# Patient Record
Sex: FEMALE | Race: WHITE | NOT HISPANIC OR LATINO | Employment: OTHER | URBAN - METROPOLITAN AREA
[De-identification: names, ages, dates, MRNs, and addresses within clinical notes are randomized per-mention and may not be internally consistent; named-entity substitution may affect disease eponyms.]

---

## 2017-02-03 ENCOUNTER — ALLSCRIPTS OFFICE VISIT (OUTPATIENT)
Dept: OTHER | Facility: OTHER | Age: 72
End: 2017-02-03

## 2018-01-11 NOTE — RESULT NOTES
Message   Please review labs  Prediabetes - follow up with me   FS     Verified Results  (1) TSH 28Apr2016 10:28AM Lizzie Garrison     Test Name Result Flag Reference   TSH 1 530 uIU/mL  0 450-4 500

## 2018-01-14 VITALS
WEIGHT: 165 LBS | SYSTOLIC BLOOD PRESSURE: 164 MMHG | DIASTOLIC BLOOD PRESSURE: 96 MMHG | HEART RATE: 60 BPM | BODY MASS INDEX: 37.12 KG/M2 | HEIGHT: 56 IN | TEMPERATURE: 97.5 F | RESPIRATION RATE: 20 BRPM

## 2018-01-15 NOTE — RESULT NOTES
Message   Labs perfect  Keep up the good work with diet  FS     Verified Results  (1) HEMOGLOBIN A1C 34QNG1646 10:19AM Tanya Chaudhary     Test Name Result Flag Reference   HEMOGLOBIN A1C 5 7 %  4 2-6 3   EST  AVG  GLUCOSE 117 mg/dl         Plan  Pre-diabetes    · Hemoglobin A1c- POC ; every 6 months;  Next [id number]; Status:Active

## 2018-11-20 DIAGNOSIS — I10 ESSENTIAL HYPERTENSION: ICD-10-CM

## 2018-11-20 DIAGNOSIS — E03.9 HYPOTHYROIDISM, UNSPECIFIED TYPE: ICD-10-CM

## 2018-11-20 DIAGNOSIS — I15.9 SECONDARY HYPERTENSION: Primary | ICD-10-CM

## 2018-11-20 RX ORDER — LOSARTAN POTASSIUM 100 MG/1
100 TABLET ORAL DAILY
Qty: 90 TABLET | Refills: 0 | Status: SHIPPED | OUTPATIENT
Start: 2018-11-20 | End: 2018-11-21 | Stop reason: SDUPTHER

## 2018-11-20 RX ORDER — LOSARTAN POTASSIUM 100 MG/1
100 TABLET ORAL DAILY
Qty: 30 TABLET | Refills: 0 | Status: SHIPPED | OUTPATIENT
Start: 2018-11-20 | End: 2018-11-20 | Stop reason: SDUPTHER

## 2018-11-20 RX ORDER — LEVOTHYROXINE SODIUM 0.03 MG/1
88 TABLET ORAL DAILY
Qty: 105 TABLET | Refills: 0 | Status: SHIPPED | OUTPATIENT
Start: 2018-11-20 | End: 2018-11-20 | Stop reason: ALTCHOICE

## 2018-11-20 RX ORDER — LEVOTHYROXINE SODIUM 88 UG/1
88 TABLET ORAL DAILY
Qty: 90 TABLET | Refills: 0 | Status: SHIPPED | OUTPATIENT
Start: 2018-11-20 | End: 2019-05-08 | Stop reason: SDUPTHER

## 2018-11-20 RX ORDER — LOSARTAN POTASSIUM 100 MG/1
100 TABLET ORAL DAILY
Qty: 90 TABLET | Refills: 0 | Status: SHIPPED | OUTPATIENT
Start: 2018-11-20 | End: 2018-11-20 | Stop reason: SDUPTHER

## 2018-11-21 DIAGNOSIS — I10 ESSENTIAL HYPERTENSION: ICD-10-CM

## 2018-11-21 RX ORDER — LOSARTAN POTASSIUM 100 MG/1
100 TABLET ORAL DAILY
Qty: 90 TABLET | Refills: 0 | Status: SHIPPED | OUTPATIENT
Start: 2018-11-21 | End: 2019-02-25 | Stop reason: SDUPTHER

## 2018-12-18 ENCOUNTER — OFFICE VISIT (OUTPATIENT)
Dept: FAMILY MEDICINE CLINIC | Facility: CLINIC | Age: 73
End: 2018-12-18
Payer: MEDICARE

## 2018-12-18 VITALS
WEIGHT: 167 LBS | RESPIRATION RATE: 20 BRPM | OXYGEN SATURATION: 95 % | SYSTOLIC BLOOD PRESSURE: 138 MMHG | TEMPERATURE: 97.6 F | HEIGHT: 58 IN | DIASTOLIC BLOOD PRESSURE: 88 MMHG | HEART RATE: 98 BPM | BODY MASS INDEX: 35.05 KG/M2

## 2018-12-18 DIAGNOSIS — E66.3 OVERWEIGHT: ICD-10-CM

## 2018-12-18 DIAGNOSIS — Z12.11 COLON CANCER SCREENING: Primary | ICD-10-CM

## 2018-12-18 DIAGNOSIS — Z00.00 ENCOUNTER FOR MEDICARE ANNUAL WELLNESS EXAM: ICD-10-CM

## 2018-12-18 DIAGNOSIS — Z23 NEED FOR INFLUENZA VACCINATION: ICD-10-CM

## 2018-12-18 DIAGNOSIS — Z23 NEED FOR VACCINATION AGAINST STREPTOCOCCUS PNEUMONIAE: ICD-10-CM

## 2018-12-18 DIAGNOSIS — Z13.820 SCREENING FOR OSTEOPOROSIS: ICD-10-CM

## 2018-12-18 LAB — SL AMB POCT HEMOGLOBIN AIC: 5

## 2018-12-18 PROCEDURE — 90670 PCV13 VACCINE IM: CPT

## 2018-12-18 PROCEDURE — G0008 ADMIN INFLUENZA VIRUS VAC: HCPCS

## 2018-12-18 PROCEDURE — G0009 ADMIN PNEUMOCOCCAL VACCINE: HCPCS

## 2018-12-18 PROCEDURE — 90662 IIV NO PRSV INCREASED AG IM: CPT

## 2018-12-18 PROCEDURE — G0439 PPPS, SUBSEQ VISIT: HCPCS | Performed by: FAMILY MEDICINE

## 2018-12-18 PROCEDURE — 83036 HEMOGLOBIN GLYCOSYLATED A1C: CPT | Performed by: FAMILY MEDICINE

## 2018-12-18 RX ORDER — AMLODIPINE BESYLATE 5 MG/1
TABLET ORAL
Refills: 0 | COMMUNITY
Start: 2018-12-02 | End: 2020-02-27 | Stop reason: ALTCHOICE

## 2018-12-18 NOTE — PROGRESS NOTES
Assessment/Plan:      Diagnoses and all orders for this visit:    Colon cancer screening  -     Ambulatory referral to Gastroenterology; Future    Screening for osteoporosis  -     DXA bone density spine hip and pelvis; Future    Other orders  -     amLODIPine (NORVASC) 5 mg tablet;           Subjective:     Patient ID: Mario Del Toro is a 74 y o  female  This is a 25-year-old female presents for a Medicare annual wellness exam         Review of Systems   Constitutional: Negative  HENT: Negative  Eyes: Negative  Respiratory: Negative  Cardiovascular: Negative  Gastrointestinal: Negative  Neurological: Negative  Objective:     Physical Exam   Constitutional: She is oriented to person, place, and time  She appears well-developed and well-nourished  HENT:   Head: Normocephalic and atraumatic  Right Ear: External ear normal    Left Ear: External ear normal    Nose: Nose normal    Mouth/Throat: Oropharynx is clear and moist  No oropharyngeal exudate  Eyes: Pupils are equal, round, and reactive to light  Conjunctivae and EOM are normal    Neck: No JVD present  No thyromegaly present  Cardiovascular: Normal rate, regular rhythm and normal heart sounds  Exam reveals no gallop and no friction rub  Murmur: 1/6 systolic murmur  Pulmonary/Chest: Effort normal and breath sounds normal  No respiratory distress  She has no wheezes  She has no rales  She exhibits no tenderness  Lymphadenopathy:     She has no cervical adenopathy  Neurological: She is alert and oriented to person, place, and time  No cranial nerve deficit   Coordination normal

## 2018-12-18 NOTE — PROGRESS NOTES
Assessment and Plan:    Problem List Items Addressed This Visit     None        Health Maintenance Due   Topic Date Due    Depression Screening PHQ  1945   [facility] Medicare Annual Wellness Visit (AWV)  1945    CRC Screening: Colonoscopy  1945    DTaP,Tdap,and Td Vaccines (1 - Tdap) 02/15/1964    Fall Risk  02/15/2008    Urinary Incontinence Screening  02/15/2008    Pneumococcal PPSV23/PCV13 65+ Years / High and Highest Risk (1 of 2 - PCV13) 02/15/2008    HEMOGLOBIN A1C  05/04/2017    INFLUENZA VACCINE  07/01/2018         HPI:  Ash Cochran is a 74 y o  female here for her annual wellness visit  There is no problem list on file for this patient  Past Medical History:   Diagnosis Date    Hyperlipidemia     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 ([address] )      Past Surgical History:   Procedure Laterality Date    BREAST LUMPECTOMY Right      Family History   Problem Relation Age of Onset    Other Mother         DJD, cervical    Diabetes Father         DM    Autism Son     Substance Abuse Neg Hx     Mental illness Neg Hx      History   Smoking Status    Never Smoker   Smokeless Tobacco    Never Used     History   Alcohol Use No      History   Drug Use No       Current Outpatient Prescriptions   Medication Sig Dispense Refill    amLODIPine (NORVASC) 5 mg tablet   0    levothyroxine 88 mcg tablet Take 1 tablet (88 mcg total) by mouth daily for 90 days 90 tablet 0    losartan (COZAAR) 100 MG tablet Take 1 tablet (100 mg total) by mouth daily for 90 days 90 tablet 0     No current facility-administered medications for this visit  Allergies   Allergen Reactions    Zoledronic Acid        There is no immunization history on file for this patient  Patient Care Team:  Kumar Wallace MD as PCP - General    Medicare Screening Tests and Risk Assessments:  Miladis Villanueva is here for her Subsequent Wellness visit        Health Risk Assessment:  Patient rates overall health as fair  Patient feels that their physical health rating is Same  Eyesight was rated as Slightly worse  Hearing was rated as Slightly worse  Patient feels that their emotional and mental health rating is Much better  Pain experienced by patient in the last 7 days has been A lot  Patient's pain rating has been 8/10  Patient states that she has experienced no weight loss or gain in last 6 months  Emotional/Mental Health:  Patient has been feeling nervous/anxious  PHQ-9 Depression Screening:    Frequency of the following problems over the past two weeks:      1  Little interest or pleasure in doing things: 0 - not at all      2  Feeling down, depressed, or hopeless: 0 - not at all  PHQ-2 Score: 0          Broken Bones/Falls: Fall Risk Assessment:    In the past year, patient has experienced: No history of falling in past year          Bladder/Bowel:  Patient has not leaked urine accidently in the last six months  Patient reports no loss of bowel control  Immunizations:  Patient has not had a flu vaccination within the last year  Patient has not received a pneumonia shot  Patient has not received a shingles shot  Home Safety:  Patient does not have trouble with stairs inside or outside of their home  Patient currently reports that there are no safety hazards present in home, working smoke alarms, working carbon monoxide detectors  Preventative Screenings:   Breast cancer screening performed, no colon cancer screen completed, glaucoma eye exam completed,     Nutrition:  Current diet: Low Cholesterol, Low Saturated Fat, Low Carb and No Added Salt with servings of the following:    Medications:  Patient is not currently taking any over-the-counter supplements  Patient is able to manage medications  Lifestyle Choices:  Patient reports no tobacco use  Patient has not smoked or used tobacco in the past   Patient reports no alcohol use  Patient does not drive a vehicle  Patient wears seat belt  Activities of Daily Living:  Can get out of bed by his or her self, able to dress self, able to make own meals, able to do own shopping, able to bathe self, can do own laundry/housekeeping, can manage own money, pay bills and track expenses    Previous Hospitalizations:  No hospitalization or ED visit in past 12 months        Advanced Directives:  Patient has not decided on power of   Patient has not completed advanced directive  Preventative Screening/Counseling:      Cardiovascular:      General: Screening Current          Diabetes:      General: Screening Current          Colorectal Cancer:      General: Risks and Benefits Discussed      Due for studies: Colonoscopy - Low Risk          Breast Cancer:      General: Screening Current          Cervical Cancer:      General: Screening Current          Osteoporosis:      General: Risks and Benefits Discussed      Due for studies: DXA Axial          AAA:      General: Screening Current          Glaucoma:      General: Risks and Benefits Discussed          HIV:      General: Screening Not Indicated          Hepatitis C:      General: Screening Not Indicated        Advanced Directives:   Patient has no living will for healthcare, does not have durable POA for healthcare, patient does not have an advanced directive  Information on ACP and/or AD provided  No 5 wishes given  No end of life assessment reviewed with patient  Immunizations:      Influenza: Influenza Due Today      Pneumococcal: Pneumococcal Due Today      TDAP: Risks & Benefits Discussed      Other Preventative Counseling (Non-Medicare):   Fall Prevention

## 2019-02-25 DIAGNOSIS — I10 ESSENTIAL HYPERTENSION: ICD-10-CM

## 2019-02-25 RX ORDER — LOSARTAN POTASSIUM 100 MG/1
100 TABLET ORAL DAILY
Qty: 90 TABLET | Refills: 0 | Status: SHIPPED | OUTPATIENT
Start: 2019-02-25 | End: 2019-02-25 | Stop reason: SDUPTHER

## 2019-02-25 RX ORDER — LOSARTAN POTASSIUM 100 MG/1
100 TABLET ORAL DAILY
Qty: 90 TABLET | Refills: 3 | Status: SHIPPED | OUTPATIENT
Start: 2019-02-25 | End: 2020-02-27 | Stop reason: SDUPTHER

## 2019-04-22 ENCOUNTER — TELEPHONE (OUTPATIENT)
Dept: FAMILY MEDICINE CLINIC | Facility: CLINIC | Age: 74
End: 2019-04-22

## 2019-05-08 DIAGNOSIS — E03.9 HYPOTHYROIDISM, UNSPECIFIED TYPE: ICD-10-CM

## 2019-05-08 RX ORDER — LEVOTHYROXINE SODIUM 88 UG/1
88 TABLET ORAL DAILY
Qty: 90 TABLET | Refills: 0 | Status: SHIPPED | OUTPATIENT
Start: 2019-05-08 | End: 2019-05-08 | Stop reason: SDUPTHER

## 2019-05-08 RX ORDER — LEVOTHYROXINE SODIUM 88 UG/1
88 TABLET ORAL DAILY
Qty: 90 TABLET | Refills: 3 | Status: SHIPPED | OUTPATIENT
Start: 2019-05-08 | End: 2020-04-16 | Stop reason: SDUPTHER

## 2020-02-22 ENCOUNTER — TELEPHONE (OUTPATIENT)
Dept: FAMILY MEDICINE CLINIC | Facility: CLINIC | Age: 75
End: 2020-02-22

## 2020-02-22 NOTE — TELEPHONE ENCOUNTER
Received a refill requrest for Losartan 100 mg tablets  Take 1 x daily    Has not been see since 12/18/18  Dr Osei Skill  Pt   Lindy Peña to leave a message but it asked me for an access number    Fax form back to pharmacy letting them know she needs an appt

## 2020-02-27 ENCOUNTER — OFFICE VISIT (OUTPATIENT)
Dept: FAMILY MEDICINE CLINIC | Facility: CLINIC | Age: 75
End: 2020-02-27
Payer: MEDICARE

## 2020-02-27 VITALS
DIASTOLIC BLOOD PRESSURE: 68 MMHG | HEART RATE: 78 BPM | WEIGHT: 168 LBS | RESPIRATION RATE: 16 BRPM | OXYGEN SATURATION: 97 % | TEMPERATURE: 98.4 F | HEIGHT: 58 IN | SYSTOLIC BLOOD PRESSURE: 130 MMHG | BODY MASS INDEX: 35.26 KG/M2

## 2020-02-27 DIAGNOSIS — I10 ESSENTIAL HYPERTENSION: Primary | ICD-10-CM

## 2020-02-27 DIAGNOSIS — Z85.3 HISTORY OF BREAST CANCER: ICD-10-CM

## 2020-02-27 DIAGNOSIS — Z23 NEED FOR INFLUENZA VACCINATION: ICD-10-CM

## 2020-02-27 DIAGNOSIS — R07.81 RIB PAIN ON RIGHT SIDE: ICD-10-CM

## 2020-02-27 DIAGNOSIS — M17.0 OSTEOARTHRITIS OF BOTH KNEES, UNSPECIFIED OSTEOARTHRITIS TYPE: ICD-10-CM

## 2020-02-27 DIAGNOSIS — E03.9 ACQUIRED HYPOTHYROIDISM: ICD-10-CM

## 2020-02-27 PROCEDURE — 1160F RVW MEDS BY RX/DR IN RCRD: CPT | Performed by: NURSE PRACTITIONER

## 2020-02-27 PROCEDURE — 36415 COLL VENOUS BLD VENIPUNCTURE: CPT | Performed by: NURSE PRACTITIONER

## 2020-02-27 PROCEDURE — 4040F PNEUMOC VAC/ADMIN/RCVD: CPT | Performed by: NURSE PRACTITIONER

## 2020-02-27 PROCEDURE — 3078F DIAST BP <80 MM HG: CPT | Performed by: NURSE PRACTITIONER

## 2020-02-27 PROCEDURE — G0008 ADMIN INFLUENZA VIRUS VAC: HCPCS

## 2020-02-27 PROCEDURE — 1036F TOBACCO NON-USER: CPT | Performed by: NURSE PRACTITIONER

## 2020-02-27 PROCEDURE — 3008F BODY MASS INDEX DOCD: CPT | Performed by: NURSE PRACTITIONER

## 2020-02-27 PROCEDURE — 3075F SYST BP GE 130 - 139MM HG: CPT | Performed by: NURSE PRACTITIONER

## 2020-02-27 PROCEDURE — 90662 IIV NO PRSV INCREASED AG IM: CPT

## 2020-02-27 PROCEDURE — 99214 OFFICE O/P EST MOD 30 MIN: CPT | Performed by: NURSE PRACTITIONER

## 2020-02-27 RX ORDER — LOSARTAN POTASSIUM 100 MG/1
100 TABLET ORAL DAILY
Qty: 90 TABLET | Refills: 3 | Status: SHIPPED | OUTPATIENT
Start: 2020-02-27 | End: 2021-02-01 | Stop reason: SDUPTHER

## 2020-02-27 NOTE — PROGRESS NOTES
Assessment/Plan:    1  Essential hypertension  Assessment & Plan:  Following up with cardiology  BP wnl in office today, stable  No changes  Orders:  -     losartan (COZAAR) 100 MG tablet; Take 1 tablet (100 mg total) by mouth daily    2  Acquired hypothyroidism  -     TSH, 3rd generation  -     T4, free    3  Rib pain on right side  Comments:  I do not beleive this to be cardiac in origin  Symptoms likely muscle spasm/MSK  If pain persists, RTO     4  Osteoarthritis of both knees, unspecified osteoarthritis type  Assessment & Plan:  Taking aleve occasionally for her symptoms  Stable  5  History of breast cancer  Assessment & Plan:  2012, per pt  Dr Molly Landrum, right breast lumpectomy and RT      6  Need for influenza vaccination  -     influenza vaccine, 6460-0811, high-dose, PF 0 5 mL (FLUZONE HIGH-DOSE)          Return for Annual physical     Subjective:      Patient ID: Red Bosch is a 66 y o  female  Chief Complaint   Patient presents with    Medication Management    Alopecia    right side pressure under rib cage       Rashmi Doyle is a 66year old female with history of breast cancer who presents to the office for medication management  Additionally she reports her hair has been falling out, mostly with brushing her hair  States her hair is currently very thin  She has been taking levothyroxine 88 mcg daily for years but has not had her thyroid levels checked in about 4 years  Additionally, reports ongoing right sided pain, underneath her ribs that she also occasionally feels in her right deltoid x's 8 months  Denies any injury  Reports the pain comes with extensive movement and goes away after standing still for about 30 seconds or so         The following portions of the patient's history were reviewed and updated as appropriate: allergies, current medications, past family history, past medical history, past social history, past surgical history and problem list     Review of Systems Respiratory: Negative for cough  Cardiovascular: Negative for chest pain, palpitations and leg swelling  Musculoskeletal: Positive for arthralgias, back pain and myalgias  Negative for gait problem  Skin: Negative for rash  Hair loss         Current Outpatient Medications   Medication Sig Dispense Refill    levothyroxine 88 mcg tablet Take 1 tablet (88 mcg total) by mouth daily for 90 days 90 tablet 3    losartan (COZAAR) 100 MG tablet Take 1 tablet (100 mg total) by mouth daily 90 tablet 3     No current facility-administered medications for this visit  Objective:    /68   Pulse 78   Temp 98 4 °F (36 9 °C) (Temporal)   Resp 16   Ht 4' 9 5" (1 461 m)   Wt 76 2 kg (168 lb)   SpO2 97%   BMI 35 73 kg/m²        Physical Exam   Constitutional: She is oriented to person, place, and time  She appears well-developed and well-nourished  No distress  HENT:   Head: Normocephalic and atraumatic  Eyes: Conjunctivae are normal  Right eye exhibits no discharge  Left eye exhibits no discharge  Neck: Normal range of motion  Neck supple  No thyromegaly present  Cardiovascular: Normal rate, regular rhythm and normal heart sounds  Pulmonary/Chest: Effort normal and breath sounds normal  No respiratory distress  She has no decreased breath sounds  She has no wheezes  She has no rhonchi  She has no rales  Abdominal: Soft  She exhibits distension (obese)  Bowel sounds are increased  There is no hepatosplenomegaly  There is no tenderness  There is no rebound  Lymphadenopathy:     She has no cervical adenopathy  Neurological: She is alert and oriented to person, place, and time  Skin: Skin is warm and dry  No rash noted  She is not diaphoretic  Psychiatric: She has a normal mood and affect   Her behavior is normal  Judgment and thought content normal          YOANA Vasquez

## 2020-02-28 ENCOUNTER — DOCUMENTATION (OUTPATIENT)
Dept: FAMILY MEDICINE CLINIC | Facility: CLINIC | Age: 75
End: 2020-02-28

## 2020-02-28 DIAGNOSIS — E78.00 ELEVATED LDL CHOLESTEROL LEVEL: ICD-10-CM

## 2020-02-28 DIAGNOSIS — R73.09 ABNORMAL BLOOD SUGAR: ICD-10-CM

## 2020-02-28 DIAGNOSIS — E66.3 OVERWEIGHT: ICD-10-CM

## 2020-02-28 DIAGNOSIS — I10 ESSENTIAL HYPERTENSION: Primary | ICD-10-CM

## 2020-02-28 DIAGNOSIS — L65.9 HAIR LOSS: Primary | ICD-10-CM

## 2020-02-28 LAB
T4 FREE SERPL-MCNC: 1.65 NG/DL (ref 0.82–1.77)
TSH SERPL DL<=0.005 MIU/L-ACNC: 2.21 UIU/ML (ref 0.45–4.5)

## 2020-04-16 DIAGNOSIS — E03.9 HYPOTHYROIDISM, UNSPECIFIED TYPE: ICD-10-CM

## 2020-04-16 RX ORDER — LEVOTHYROXINE SODIUM 88 UG/1
88 TABLET ORAL DAILY
Qty: 90 TABLET | Refills: 3 | Status: SHIPPED | OUTPATIENT
Start: 2020-04-16 | End: 2021-04-30

## 2020-04-20 ENCOUNTER — TELEPHONE (OUTPATIENT)
Dept: FAMILY MEDICINE CLINIC | Facility: CLINIC | Age: 75
End: 2020-04-20

## 2020-12-14 ENCOUNTER — TELEMEDICINE (OUTPATIENT)
Dept: FAMILY MEDICINE CLINIC | Facility: CLINIC | Age: 75
End: 2020-12-14
Payer: MEDICARE

## 2020-12-14 ENCOUNTER — TELEPHONE (OUTPATIENT)
Dept: FAMILY MEDICINE CLINIC | Facility: CLINIC | Age: 75
End: 2020-12-14

## 2020-12-14 VITALS — HEIGHT: 57 IN | WEIGHT: 155 LBS | BODY MASS INDEX: 33.44 KG/M2

## 2020-12-14 DIAGNOSIS — R63.0 DECREASED APPETITE: ICD-10-CM

## 2020-12-14 DIAGNOSIS — R05.9 COUGH: ICD-10-CM

## 2020-12-14 DIAGNOSIS — R11.0 NAUSEA: Primary | ICD-10-CM

## 2020-12-14 DIAGNOSIS — R51.9 ACUTE NONINTRACTABLE HEADACHE, UNSPECIFIED HEADACHE TYPE: ICD-10-CM

## 2020-12-14 DIAGNOSIS — R19.7 DIARRHEA, UNSPECIFIED TYPE: ICD-10-CM

## 2020-12-14 DIAGNOSIS — R68.83 CHILLS: ICD-10-CM

## 2020-12-14 DIAGNOSIS — R11.0 NAUSEA: ICD-10-CM

## 2020-12-14 PROCEDURE — U0003 INFECTIOUS AGENT DETECTION BY NUCLEIC ACID (DNA OR RNA); SEVERE ACUTE RESPIRATORY SYNDROME CORONAVIRUS 2 (SARS-COV-2) (CORONAVIRUS DISEASE [COVID-19]), AMPLIFIED PROBE TECHNIQUE, MAKING USE OF HIGH THROUGHPUT TECHNOLOGIES AS DESCRIBED BY CMS-2020-01-R: HCPCS | Performed by: NURSE PRACTITIONER

## 2020-12-14 PROCEDURE — 99213 OFFICE O/P EST LOW 20 MIN: CPT | Performed by: NURSE PRACTITIONER

## 2020-12-14 RX ORDER — ONDANSETRON 4 MG/1
4 TABLET, FILM COATED ORAL EVERY 8 HOURS PRN
Qty: 20 TABLET | Refills: 0 | Status: SHIPPED | OUTPATIENT
Start: 2020-12-14 | End: 2022-05-26

## 2020-12-14 NOTE — TELEPHONE ENCOUNTER
Pt reports that she has [facility] done at [facility]  Can we request these results?     Tesfaye Dunn

## 2020-12-15 ENCOUNTER — TELEMEDICINE (OUTPATIENT)
Dept: FAMILY MEDICINE CLINIC | Facility: CLINIC | Age: 75
End: 2020-12-15
Payer: MEDICARE

## 2020-12-15 VITALS — BODY MASS INDEX: 33.44 KG/M2 | HEIGHT: 57 IN | WEIGHT: 155 LBS

## 2020-12-15 DIAGNOSIS — R63.0 DECREASED APPETITE: ICD-10-CM

## 2020-12-15 DIAGNOSIS — R42 DIZZY: ICD-10-CM

## 2020-12-15 DIAGNOSIS — R51.9 ACUTE NONINTRACTABLE HEADACHE, UNSPECIFIED HEADACHE TYPE: Primary | ICD-10-CM

## 2020-12-15 DIAGNOSIS — R05.9 COUGH: ICD-10-CM

## 2020-12-15 PROCEDURE — 99442 PR PHYS/QHP TELEPHONE EVALUATION 11-20 MIN: CPT | Performed by: NURSE PRACTITIONER

## 2020-12-17 LAB — SARS-COV-2 RNA SPEC QL NAA+PROBE: NOT DETECTED

## 2021-02-01 DIAGNOSIS — I10 ESSENTIAL HYPERTENSION: ICD-10-CM

## 2021-02-01 RX ORDER — LOSARTAN POTASSIUM 100 MG/1
100 TABLET ORAL DAILY
Qty: 90 TABLET | Refills: 3 | Status: SHIPPED | OUTPATIENT
Start: 2021-02-01 | End: 2022-06-21

## 2021-02-12 DIAGNOSIS — Z23 ENCOUNTER FOR IMMUNIZATION: ICD-10-CM

## 2021-03-02 ENCOUNTER — IMMUNIZATIONS (OUTPATIENT)
Dept: FAMILY MEDICINE CLINIC | Facility: HOSPITAL | Age: 76
End: 2021-03-02

## 2021-03-02 DIAGNOSIS — Z23 ENCOUNTER FOR IMMUNIZATION: Primary | ICD-10-CM

## 2021-03-02 PROCEDURE — 91301 SARS-COV-2 / COVID-19 MRNA VACCINE (MODERNA) 100 MCG: CPT

## 2021-03-02 PROCEDURE — 0011A SARS-COV-2 / COVID-19 MRNA VACCINE (MODERNA) 100 MCG: CPT

## 2021-03-30 ENCOUNTER — IMMUNIZATIONS (OUTPATIENT)
Dept: FAMILY MEDICINE CLINIC | Facility: HOSPITAL | Age: 76
End: 2021-03-30

## 2021-03-30 DIAGNOSIS — Z23 ENCOUNTER FOR IMMUNIZATION: Primary | ICD-10-CM

## 2021-03-30 PROCEDURE — 0012A SARS-COV-2 / COVID-19 MRNA VACCINE (MODERNA) 100 MCG: CPT

## 2021-03-30 PROCEDURE — 91301 SARS-COV-2 / COVID-19 MRNA VACCINE (MODERNA) 100 MCG: CPT

## 2021-04-30 DIAGNOSIS — E03.9 HYPOTHYROIDISM, UNSPECIFIED TYPE: ICD-10-CM

## 2021-04-30 RX ORDER — LEVOTHYROXINE SODIUM 88 UG/1
TABLET ORAL
Qty: 90 TABLET | Refills: 3 | Status: SHIPPED | OUTPATIENT
Start: 2021-04-30 | End: 2022-05-27 | Stop reason: SDUPTHER

## 2022-05-12 LAB
ALBUMIN SERPL-MCNC: 4.7 G/DL (ref 3.6–5.1)
ALBUMIN/GLOB SERPL: 1.4 (CALC) (ref 1–2.5)
ALP SERPL-CCNC: 89 U/L (ref 37–153)
ALT SERPL-CCNC: 12 U/L (ref 6–29)
AST SERPL-CCNC: 20 U/L (ref 10–35)
BILIRUB SERPL-MCNC: 1.2 MG/DL (ref 0.2–1.2)
BUN SERPL-MCNC: 12 MG/DL (ref 7–25)
BUN/CREAT SERPL: 24 (CALC) (ref 6–22)
CALCIUM SERPL-MCNC: 10.1 MG/DL (ref 8.6–10.4)
CHLORIDE SERPL-SCNC: 102 MMOL/L (ref 98–110)
CHOLEST SERPL-MCNC: 176 MG/DL
CHOLEST/HDLC SERPL: 3.4 (CALC)
CO2 SERPL-SCNC: 30 MMOL/L (ref 20–32)
CREAT SERPL-MCNC: 0.5 MG/DL (ref 0.6–0.93)
GLOBULIN SER CALC-MCNC: 3.3 G/DL (CALC) (ref 1.9–3.7)
GLUCOSE SERPL-MCNC: 98 MG/DL (ref 65–99)
HCV AB S/CO SERPL IA: 0.13
HCV AB SERPL QL IA: NORMAL
HDLC SERPL-MCNC: 52 MG/DL
LDLC SERPL CALC-MCNC: 107 MG/DL (CALC)
NONHDLC SERPL-MCNC: 124 MG/DL (CALC)
POTASSIUM SERPL-SCNC: 4 MMOL/L (ref 3.5–5.3)
PROT SERPL-MCNC: 8 G/DL (ref 6.1–8.1)
SL AMB EGFR AFRICAN AMERICAN: 107 ML/MIN/1.73M2
SL AMB EGFR NON AFRICAN AMERICAN: 92 ML/MIN/1.73M2
SODIUM SERPL-SCNC: 141 MMOL/L (ref 135–146)
TRIGL SERPL-MCNC: 83 MG/DL
TSH SERPL-ACNC: 0.87 MIU/L (ref 0.4–4.5)

## 2022-05-20 ENCOUNTER — TELEPHONE (OUTPATIENT)
Dept: FAMILY MEDICINE CLINIC | Facility: CLINIC | Age: 77
End: 2022-05-20

## 2022-05-20 ENCOUNTER — OFFICE VISIT (OUTPATIENT)
Dept: FAMILY MEDICINE CLINIC | Facility: CLINIC | Age: 77
End: 2022-05-20
Payer: MEDICARE

## 2022-05-20 VITALS
SYSTOLIC BLOOD PRESSURE: 152 MMHG | WEIGHT: 163 LBS | BODY MASS INDEX: 35.27 KG/M2 | HEART RATE: 94 BPM | TEMPERATURE: 97.8 F | RESPIRATION RATE: 14 BRPM | DIASTOLIC BLOOD PRESSURE: 80 MMHG | OXYGEN SATURATION: 99 %

## 2022-05-20 DIAGNOSIS — M25.431 PAIN AND SWELLING OF RIGHT WRIST: Primary | ICD-10-CM

## 2022-05-20 DIAGNOSIS — M25.511 CHRONIC RIGHT SHOULDER PAIN: ICD-10-CM

## 2022-05-20 DIAGNOSIS — G89.29 CHRONIC RIGHT SHOULDER PAIN: ICD-10-CM

## 2022-05-20 DIAGNOSIS — M25.531 PAIN AND SWELLING OF RIGHT WRIST: Primary | ICD-10-CM

## 2022-05-20 DIAGNOSIS — M54.2 NECK PAIN ON RIGHT SIDE: ICD-10-CM

## 2022-05-20 PROCEDURE — 99213 OFFICE O/P EST LOW 20 MIN: CPT | Performed by: NURSE PRACTITIONER

## 2022-05-20 RX ORDER — AMLODIPINE BESYLATE 5 MG/1
5 TABLET ORAL DAILY
COMMUNITY
Start: 2022-04-06

## 2022-05-20 NOTE — TELEPHONE ENCOUNTER
Xray of right wrist    Extreme severe ostoarthirits/degenerative joint disease first metacarpal joint    Will fax report and should have in about 20 minutes

## 2022-05-20 NOTE — PROGRESS NOTES
Assessment/Plan:    1  Pain and swelling of right wrist  -     XR wrist 3+ vw right; Future; Expected date: 05/20/2022    2  Neck pain on right side  -     Ambulatory Referral to Physical Therapy; Future    3  Chronic right shoulder pain  -     Ambulatory Referral to Physical Therapy; Future            Patient Instructions   Rest, elevate and ice the area  Complete xray evaluation  Follow up with ortho       Return in about 1 week (around 5/27/2022), or if symptoms worsen or fail to improve  Subjective:      Patient ID: Ramsey Hollis is a 76 y o  female  Chief Complaint   Patient presents with    Joint Swelling     Pt here for swollen right wrist with pain, pain in shoulder and arm too       Wrist Pain   The pain is present in the right wrist  This is a new problem  The current episode started in the past 7 days  The problem occurs constantly  The problem has been unchanged  The quality of the pain is described as sharp  The pain is severe  Associated symptoms include a limited range of motion and stiffness  Pertinent negatives include no numbness  She has tried nothing for the symptoms  Family history includes rheumatoid arthritis  Her past medical history is significant for osteoarthritis  The following portions of the patient's history were reviewed and updated as appropriate: allergies, current medications, past family history, past medical history, past social history, past surgical history and problem list     Review of Systems   Musculoskeletal: Positive for arthralgias, joint swelling, myalgias and stiffness  Neurological: Negative for numbness  Current Outpatient Medications   Medication Sig Dispense Refill    amLODIPine (NORVASC) 5 mg tablet Take 5 mg by mouth in the morning        levothyroxine 88 mcg tablet TAKE 1 TABLET(88 MCG) BY MOUTH DAILY 90 tablet 3    ondansetron (ZOFRAN) 4 mg tablet Take 1 tablet (4 mg total) by mouth every 8 (eight) hours as needed for nausea or vomiting 20 tablet 0    losartan (COZAAR) 100 MG tablet Take 1 tablet (100 mg total) by mouth daily 90 tablet 3     No current facility-administered medications for this visit  Objective:    /80   Pulse 94   Temp 97 8 °F (36 6 °C) (Temporal)   Resp 14   Wt 73 9 kg (163 lb)   SpO2 99%   BMI 35 27 kg/m²        Physical Exam  Vitals reviewed  Constitutional:       Appearance: Normal appearance  HENT:      Head: Normocephalic and atraumatic  Musculoskeletal:      Right wrist: Swelling and tenderness present  No effusion  Decreased range of motion  Right hand: Swelling and tenderness present  No bony tenderness  Decreased range of motion  Decreased strength  Neurological:      Mental Status: She is alert and oriented to person, place, and time     Psychiatric:         Mood and Affect: Mood normal                 YOANA Vasquez

## 2022-05-26 ENCOUNTER — OFFICE VISIT (OUTPATIENT)
Dept: FAMILY MEDICINE CLINIC | Facility: CLINIC | Age: 77
End: 2022-05-26
Payer: MEDICARE

## 2022-05-26 VITALS
HEART RATE: 65 BPM | RESPIRATION RATE: 16 BRPM | SYSTOLIC BLOOD PRESSURE: 140 MMHG | BODY MASS INDEX: 35.06 KG/M2 | DIASTOLIC BLOOD PRESSURE: 76 MMHG | TEMPERATURE: 97.6 F | OXYGEN SATURATION: 99 % | WEIGHT: 162 LBS

## 2022-05-26 DIAGNOSIS — M12.811 ROTATOR CUFF ARTHROPATHY OF RIGHT SHOULDER: Primary | ICD-10-CM

## 2022-05-26 DIAGNOSIS — Z13.820 SCREENING FOR OSTEOPOROSIS: ICD-10-CM

## 2022-05-26 DIAGNOSIS — M25.511 CHRONIC RIGHT SHOULDER PAIN: Primary | ICD-10-CM

## 2022-05-26 DIAGNOSIS — G89.29 CHRONIC RIGHT SHOULDER PAIN: Primary | ICD-10-CM

## 2022-05-26 DIAGNOSIS — E66.01 OBESITY, MORBID (HCC): ICD-10-CM

## 2022-05-26 DIAGNOSIS — Z78.0 POST-MENOPAUSAL: ICD-10-CM

## 2022-05-26 DIAGNOSIS — M25.611 DECREASED RANGE OF MOTION OF RIGHT SHOULDER: ICD-10-CM

## 2022-05-26 PROCEDURE — 99214 OFFICE O/P EST MOD 30 MIN: CPT | Performed by: NURSE PRACTITIONER

## 2022-05-26 NOTE — PROGRESS NOTES
Assessment/Plan:    1  Chronic right shoulder pain  -     XR shoulder 2+ vw right; Future; Expected date: 05/26/2022  -     Ambulatory Referral to Orthopedic Surgery; Future    2  Decreased range of motion of right shoulder  -     XR shoulder 2+ vw right; Future; Expected date: 05/26/2022  -     Ambulatory Referral to Orthopedic Surgery; Future    3  Obesity, morbid ([address] )  Assessment & Plan:  Encourage regular exercise as tolerated  4  Screening for osteoporosis  -     DXA bone density spine hip and pelvis; Future; Expected date: 05/26/2022    5  Post-menopausal  -     DXA bone density spine hip and pelvis; Future; Expected date: 05/26/2022          Return in about 3 months (around 8/26/2022) for Recheck  Subjective:      Patient ID: Shanika Tabor is a 76 y o  female  Chief Complaint   Patient presents with    Follow-up     One week       Emely Benoit is a 76year old female with osteoarthritis who presents to the office for a recheck of right wrist swelling  Pt reports significant improvement with prednisone taper  Reports that she continues to have chronic right shoulder and arm pain which decreased range of motion  Reports she is unable to fully life her arm above 90 degrees or move her arm behind her back  The following portions of the patient's history were reviewed and updated as appropriate: allergies, current medications, past family history, past medical history, past social history, past surgical history and problem list     Review of Systems   Constitutional: Negative for chills, fatigue and fever  Respiratory: Negative for cough, chest tightness and shortness of breath  Cardiovascular: Negative for chest pain, palpitations and leg swelling  Current Outpatient Medications   Medication Sig Dispense Refill    amLODIPine (NORVASC) 5 mg tablet Take 5 mg by mouth in the morning        levothyroxine 88 mcg tablet TAKE 1 TABLET(88 MCG) BY MOUTH DAILY 90 tablet 3    losartan (COZAAR) 100 MG tablet Take 1 tablet (100 mg total) by mouth daily 90 tablet 3    predniSONE 20 mg tablet Take 2 tablets PO daily x's 3 days, then take 1 tablet daily x's 3 days, then take 1/2 tablet daily x's 3 days 11 tablet 0     No current facility-administered medications for this visit  Objective:    /76   Pulse 65   Temp 97 6 °F (36 4 °C) (Temporal)   Resp 16   Wt 73 5 kg (162 lb)   SpO2 99%   BMI 35 06 kg/m²        Physical Exam  Vitals reviewed  Constitutional:       General: She is not in acute distress  Appearance: She is well-developed  She is not diaphoretic  HENT:      Head: Normocephalic and atraumatic  Eyes:      General:         Right eye: No discharge  Left eye: No discharge  Conjunctiva/sclera: Conjunctivae normal    Neck:      Thyroid: No thyromegaly  Cardiovascular:      Rate and Rhythm: Normal rate and regular rhythm  Heart sounds: Normal heart sounds  Pulmonary:      Effort: Pulmonary effort is normal  No respiratory distress  Breath sounds: Normal breath sounds  No decreased breath sounds, wheezing, rhonchi or rales  Musculoskeletal:      Right shoulder: Crepitus present  No swelling or tenderness  Decreased range of motion  Right wrist: No swelling or deformity  Normal range of motion  Cervical back: Normal range of motion and neck supple  Lymphadenopathy:      Cervical: No cervical adenopathy  Skin:     General: Skin is warm and dry  Findings: No rash  Neurological:      Mental Status: She is alert and oriented to person, place, and time  Psychiatric:         Behavior: Behavior normal          Thought Content:  Thought content normal          Judgment: Judgment normal                 YOANA Schwartz

## 2022-05-27 DIAGNOSIS — E03.9 HYPOTHYROIDISM, UNSPECIFIED TYPE: ICD-10-CM

## 2022-05-27 RX ORDER — LEVOTHYROXINE SODIUM 88 UG/1
88 TABLET ORAL DAILY
Qty: 90 TABLET | Refills: 3 | Status: SHIPPED | OUTPATIENT
Start: 2022-05-27

## 2022-06-01 ENCOUNTER — HOSPITAL ENCOUNTER (OUTPATIENT)
Dept: RADIOLOGY | Facility: HOSPITAL | Age: 77
Discharge: HOME | End: 2022-06-01
Payer: MEDICARE

## 2022-06-01 DIAGNOSIS — Z13.820 SCREENING FOR OSTEOPOROSIS: ICD-10-CM

## 2022-06-01 DIAGNOSIS — Z78.0 POST-MENOPAUSAL: ICD-10-CM

## 2022-06-01 PROCEDURE — 77080 DXA BONE DENSITY AXIAL: CPT

## 2022-06-02 ENCOUNTER — TELEPHONE (OUTPATIENT)
Dept: FAMILY MEDICINE CLINIC | Facility: CLINIC | Age: 77
End: 2022-06-02

## 2022-06-02 DIAGNOSIS — M19.031 PRIMARY OSTEOARTHRITIS OF RIGHT WRIST: ICD-10-CM

## 2022-06-02 DIAGNOSIS — M25.431 PAIN AND SWELLING OF RIGHT WRIST: Primary | ICD-10-CM

## 2022-06-02 DIAGNOSIS — M25.531 PAIN AND SWELLING OF RIGHT WRIST: Primary | ICD-10-CM

## 2022-06-02 DIAGNOSIS — M25.531 RIGHT WRIST PAIN: ICD-10-CM

## 2022-06-02 NOTE — TELEPHONE ENCOUNTER
I recommend that pt be scheduled with orthopedic surgery - Dr Gibran Lopez, if he is still here OR I think she also sees [facility] - whoever the hand specialist is       Tesfaye Hernandez

## 2022-06-02 NOTE — TELEPHONE ENCOUNTER
Informed Jasvir of Roma's response  Mitchel Lester states he will call Dr July Hernandez office to see who they recommend for hands/ wrists and schedule another appt if Dr Denzil Primrose cant see her for both    No further action

## 2022-06-02 NOTE — TELEPHONE ENCOUNTER
Arnulfo Marcano's response  Dr Uday Armendariz is scheduled 6/21 and will see you 6/10  Also has MRI scheduled 6/18

## 2022-06-02 NOTE — TELEPHONE ENCOUNTER
Stated that she finished the medication yesterday  Woke up this morning and her wrist is swollen again  While on the medication, her wrist did not hurt and the swelling went down      Please advise

## 2022-06-10 ENCOUNTER — OFFICE VISIT (OUTPATIENT)
Dept: FAMILY MEDICINE CLINIC | Facility: CLINIC | Age: 77
End: 2022-06-10
Payer: MEDICARE

## 2022-06-10 VITALS
OXYGEN SATURATION: 99 % | HEART RATE: 82 BPM | TEMPERATURE: 97 F | BODY MASS INDEX: 32.25 KG/M2 | DIASTOLIC BLOOD PRESSURE: 78 MMHG | HEIGHT: 59 IN | RESPIRATION RATE: 16 BRPM | WEIGHT: 160 LBS | SYSTOLIC BLOOD PRESSURE: 142 MMHG

## 2022-06-10 DIAGNOSIS — G89.29 CHRONIC RIGHT SHOULDER PAIN: ICD-10-CM

## 2022-06-10 DIAGNOSIS — M25.511 CHRONIC RIGHT SHOULDER PAIN: ICD-10-CM

## 2022-06-10 DIAGNOSIS — M12.811 ROTATOR CUFF ARTHROPATHY OF RIGHT SHOULDER: ICD-10-CM

## 2022-06-10 DIAGNOSIS — I10 PRIMARY HYPERTENSION: ICD-10-CM

## 2022-06-10 DIAGNOSIS — Z00.00 MEDICARE ANNUAL WELLNESS VISIT, SUBSEQUENT: Primary | ICD-10-CM

## 2022-06-10 DIAGNOSIS — M25.431 PAIN AND SWELLING OF RIGHT WRIST: ICD-10-CM

## 2022-06-10 DIAGNOSIS — Z85.3 HISTORY OF BREAST CANCER: ICD-10-CM

## 2022-06-10 DIAGNOSIS — M25.531 PAIN AND SWELLING OF RIGHT WRIST: ICD-10-CM

## 2022-06-10 DIAGNOSIS — E66.01 OBESITY, MORBID (HCC): ICD-10-CM

## 2022-06-10 DIAGNOSIS — R73.03 PRE-DIABETES: ICD-10-CM

## 2022-06-10 DIAGNOSIS — M17.0 OSTEOARTHRITIS OF BOTH KNEES, UNSPECIFIED OSTEOARTHRITIS TYPE: ICD-10-CM

## 2022-06-10 DIAGNOSIS — M25.531 RIGHT WRIST PAIN: ICD-10-CM

## 2022-06-10 DIAGNOSIS — E03.9 ACQUIRED HYPOTHYROIDISM: ICD-10-CM

## 2022-06-10 PROCEDURE — G0438 PPPS, INITIAL VISIT: HCPCS | Performed by: NURSE PRACTITIONER

## 2022-06-10 PROCEDURE — 1123F ACP DISCUSS/DSCN MKR DOCD: CPT | Performed by: NURSE PRACTITIONER

## 2022-06-10 PROCEDURE — 99214 OFFICE O/P EST MOD 30 MIN: CPT | Performed by: NURSE PRACTITIONER

## 2022-06-10 NOTE — ASSESSMENT & PLAN NOTE
BP wnl  Taking losartan and amlodipine without issues  Stable, no changes  Reports yearly cardiology follow up

## 2022-06-10 NOTE — PATIENT INSTRUCTIONS
Medicare Preventive Visit Patient Instructions  Thank you for completing your Welcome to Medicare Visit or Medicare Annual Wellness Visit today  Your next wellness visit will be due in one year (6/11/2023)  The screening/preventive services that you may require over the next 5-10 years are detailed below  Some tests may not apply to you based off risk factors and/or age  Screening tests ordered at today's visit but not completed yet may show as past due  Also, please note that scanned in results may not display below  Preventive Screenings:  Service Recommendations Previous Testing/Comments   Colorectal Cancer Screening  * Colonoscopy    * Fecal Occult Blood Test (FOBT)/Fecal Immunochemical Test (FIT)  * Fecal DNA/Cologuard Test  * Flexible Sigmoidoscopy Age: 54-65 years old   Colonoscopy: every 10 years (may be performed more frequently if at higher risk)  OR  FOBT/FIT: every 1 year  OR  Cologuard: every 3 years  OR  Sigmoidoscopy: every 5 years  Screening may be recommended earlier than age 48 if at higher risk for colorectal cancer  Also, an individualized decision between you and your healthcare provider will decide whether screening between the ages of 74-80 would be appropriate  Colonoscopy: Not on file  FOBT/FIT: Not on file  Cologuard: Not on file  Sigmoidoscopy: Not on file          Breast Cancer Screening Age: 36 years old  Frequency: every 1-2 years  Not required if history of left and right mastectomy Mammogram: Not on file    History Breast Cancer   Cervical Cancer Screening Between the ages of 21-29, pap smear recommended once every 3 years  Between the ages of 33-67, can perform pap smear with HPV co-testing every 5 years     Recommendations may differ for women with a history of total hysterectomy, cervical cancer, or abnormal pap smears in past  Pap Smear: Not on file    Screening Not Indicated   Hepatitis C Screening Once for adults born between 1945 and 1965  More frequently in patients at high risk for Hepatitis C Hep C Antibody: 05/12/2022    Screening Current   Diabetes Screening 1-2 times per year if you're at risk for diabetes or have pre-diabetes Fasting glucose: No results in last 5 years   A1C: 5 0    Screening Current   Cholesterol Screening Once every 5 years if you don't have a lipid disorder  May order more often based on risk factors  Lipid panel: 05/12/2022    Screening Current     Other Preventive Screenings Covered by Medicare:  1  Abdominal Aortic Aneurysm (AAA) Screening: covered once if your at risk  You're considered to be at risk if you have a family history of AAA  2  Lung Cancer Screening: covers low dose CT scan once per year if you meet all of the following conditions: (1) Age 50-69; (2) No signs or symptoms of lung cancer; (3) Current smoker or have quit smoking within the last 15 years; (4) You have a tobacco smoking history of at least 30 pack years (packs per day multiplied by number of years you smoked); (5) You get a written order from a healthcare provider  3  Glaucoma Screening: covered annually if you're considered high risk: (1) You have diabetes OR (2) Family history of glaucoma OR (3)  aged 48 and older OR (3)  American aged 72 and older  3  Osteoporosis Screening: covered every 2 years if you meet one of the following conditions: (1) You're estrogen deficient and at risk for osteoporosis based off medical history and other findings; (2) Have a vertebral abnormality; (3) On glucocorticoid therapy for more than 3 months; (4) Have primary hyperparathyroidism; (5) On osteoporosis medications and need to assess response to drug therapy  · Last bone density test (DXA Scan): 06/01/2022   5  HIV Screening: covered annually if you're between the age of 15-65  Also covered annually if you are younger than 13 and older than 72 with risk factors for HIV infection   For pregnant patients, it is covered up to 3 times per pregnancy  Immunizations:  Immunization Recommendations   Influenza Vaccine Annual influenza vaccination during flu season is recommended for all persons aged >= 6 months who do not have contraindications   Pneumococcal Vaccine (Prevnar and Pneumovax)  * Prevnar = PCV13  * Pneumovax = PPSV23   Adults 25-60 years old: 1-3 doses may be recommended based on certain risk factors  Adults 72 years old: Prevnar (PCV13) vaccine recommended followed by Pneumovax (PPSV23) vaccine  If already received PPSV23 since turning 65, then PCV13 recommended at least one year after PPSV23 dose  Hepatitis B Vaccine 3 dose series if at intermediate or high risk (ex: diabetes, end stage renal disease, liver disease)   Tetanus (Td) Vaccine - COST NOT COVERED BY MEDICARE PART B Following completion of primary series, a booster dose should be given every 10 years to maintain immunity against tetanus  Td may also be given as tetanus wound prophylaxis  Tdap Vaccine - COST NOT COVERED BY MEDICARE PART B Recommended at least once for all adults  For pregnant patients, recommended with each pregnancy  Shingles Vaccine (Shingrix) - COST NOT COVERED BY MEDICARE PART B  2 shot series recommended in those aged 48 and above     Health Maintenance Due:      Topic Date Due    Hepatitis C Screening  Completed     Immunizations Due:      Topic Date Due    DTaP,Tdap,and Td Vaccines (1 - Tdap) Never done    Pneumococcal Vaccine: 65+ Years (2 - PPSV23 or PCV20) 12/18/2019    COVID-19 Vaccine (3 - Booster for Moderna series) 08/30/2021    Influenza Vaccine (Season Ended) 09/01/2022     Advance Directives   What are advance directives? Advance directives are legal documents that state your wishes and plans for medical care  These plans are made ahead of time in case you lose your ability to make decisions for yourself  Advance directives can apply to any medical decision, such as the treatments you want, and if you want to donate organs     What are the types of advance directives? There are many types of advance directives, and each state has rules about how to use them  You may choose a combination of any of the following:  · Living will: This is a written record of the treatment you want  You can also choose which treatments you do not want, which to limit, and which to stop at a certain time  This includes surgery, medicine, IV fluid, and tube feedings  · Durable power of  for healthcare [facility]): This is a written record that states who you want to make healthcare choices for you when you are unable to make them for yourself  This person, called a proxy, is usually a family member or a friend  You may choose more than 1 proxy  · Do not resuscitate (DNR) order:  A DNR order is used in case your heart stops beating or you stop breathing  It is a request not to have certain forms of treatment, such as CPR  A DNR order may be included in other types of advance directives  · Medical directive: This covers the care that you want if you are in a coma, near death, or unable to make decisions for yourself  You can list the treatments you want for each condition  Treatment may include pain medicine, surgery, blood transfusions, dialysis, IV or tube feedings, and a ventilator (breathing machine)  · Values history: This document has questions about your views, beliefs, and how you feel and think about life  This information can help others choose the care that you would choose  Why are advance directives important? An advance directive helps you control your care  Although spoken wishes may be used, it is better to have your wishes written down  Spoken wishes can be misunderstood, or not followed  Treatments may be given even if you do not want them  An advance directive may make it easier for your family to make difficult choices about your care     Weight Management   Why it is important to manage your weight:  Being overweight increases your risk of health conditions such as heart disease, high blood pressure, type 2 diabetes, and certain types of cancer  It can also increase your risk for osteoarthritis, sleep apnea, and other respiratory problems  Aim for a slow, steady weight loss  Even a small amount of weight loss can lower your risk of health problems  How to lose weight safely:  A safe and healthy way to lose weight is to eat fewer calories and get regular exercise  You can lose up about 1 pound a week by decreasing the number of calories you eat by 500 calories each day  Healthy meal plan for weight management:  A healthy meal plan includes a variety of foods, contains fewer calories, and helps you stay healthy  A healthy meal plan includes the following:  · Eat whole-grain foods more often  A healthy meal plan should contain fiber  Fiber is the part of grains, fruits, and vegetables that is not broken down by your body  Whole-grain foods are healthy and provide extra fiber in your diet  Some examples of whole-grain foods are whole-wheat breads and pastas, oatmeal, brown rice, and bulgur  · Eat a variety of vegetables every day  Include dark, leafy greens such as spinach, kale, kobe greens, and mustard greens  Eat yellow and orange vegetables such as carrots, sweet potatoes, and winter squash  · Eat a variety of fruits every day  Choose fresh or canned fruit (canned in its own juice or light syrup) instead of juice  Fruit juice has very little or no fiber  · Eat low-fat dairy foods  Drink fat-free (skim) milk or 1% milk  Eat fat-free yogurt and low-fat cottage cheese  Try low-fat cheeses such as mozzarella and other reduced-fat cheeses  · Choose meat and other protein foods that are low in fat  Choose beans or other legumes such as split peas or lentils  Choose fish, skinless poultry (chicken or turkey), or lean cuts of red meat (beef or pork)  Before you cook meat or poultry, cut off any visible fat  · Use less fat and oil  Try baking foods instead of frying them  Add less fat, such as margarine, sour cream, regular salad dressing and mayonnaise to foods  Eat fewer high-fat foods  Some examples of high-fat foods include french fries, doughnuts, ice cream, and cakes  · Eat fewer sweets  Limit foods and drinks that are high in sugar  This includes candy, cookies, regular soda, and sweetened drinks  Exercise:  Exercise at least 30 minutes per day on most days of the week  Some examples of exercise include walking, biking, dancing, and swimming  You can also fit in more physical activity by taking the stairs instead of the elevator or parking farther away from stores  Ask your healthcare provider about the best exercise plan for you  © Copyright [employer] 2018 Information is for End User's use only and may not be sold, redistributed or otherwise used for commercial purposes   All illustrations and images included in CareNotes® are the copyrighted property of A D A M , Inc  or [address] [place], [place] [address]pe

## 2022-06-10 NOTE — PROGRESS NOTES
Assessment and Plan:      Medicare annual wellness visit, subsequent    -  Primary    Age appropriate screenings and recommendations discussed  Fasting labs reviewed  BMI Counseling: Body mass index is 32 32 kg/m²  The BMI is above normal  Nutrition recommendations include decreasing portion sizes, encouraging healthy choices of fruits and vegetables, decreasing fast food intake, consuming healthier snacks, limiting drinks that contain sugar, moderation in carbohydrate intake, increasing intake of lean protein, reducing intake of saturated and trans fat and reducing intake of cholesterol  Exercise recommendations include exercising 3-5 times per week  Rationale for BMI follow-up plan is due to patient being overweight or obese  Preventive health issues were discussed with patient, and age appropriate screening tests were ordered as noted in patient's After Visit Summary  Personalized health advice and appropriate referrals for health education or preventive services given if needed, as noted in patient's After Visit Summary       History of Present Illness:     Patient presents for Medicare Annual Wellness visit    Patient Care Team:  Moraima andre PCP - General (Family Medicine)     Problem List:     Patient Active Problem List   Diagnosis    Abnormal blood sugar    Hypertension    Hypothyroidism    Osteoarthritis of knees, bilateral    Overweight    Pre-diabetes    History of breast cancer    Obesity, morbid ([address] )      Past Medical and Surgical History:     Past Medical History:   Diagnosis Date    Hyperlipidemia     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 [facility])      Past Surgical History:   Procedure Laterality Date    BREAST LUMPECTOMY Right     HYSTERECTOMY      partial - age 42      Family History:     Family History   Problem Relation Age of Onset    Other Mother         DJD, cervical    Diabetes Father         DM    Autism Son     Substance Abuse Neg Hx     Mental illness Neg Hx       Social History:     Social History     Socioeconomic History    Marital status: /Civil Union     Spouse name: None    Number of children: None    Years of education: None    Highest education level: None   Occupational History    None   Tobacco Use    Smoking status: Never Smoker    Smokeless tobacco: Never Used   Vaping Use    Vaping Use: Never used   Substance and Sexual Activity    Alcohol use: No    Drug use: No    Sexual activity: None   Other Topics Concern    None   Social History Narrative    Dental care, regularly    Caffeine use    Tea     Social Determinants of Health     Financial Resource Strain: Not on file   Food Insecurity: Not on file   Transportation Needs: Not on file   Physical Activity: Not on file   Stress: Not on file   Social Connections: Not on file   Intimate Partner Violence: Not on file   Housing Stability: Not on file      Medications and Allergies:     Current Outpatient Medications   Medication Sig Dispense Refill    amLODIPine (NORVASC) 5 mg tablet Take 5 mg by mouth in the morning   levothyroxine 88 mcg tablet Take 1 tablet (88 mcg total) by mouth daily 90 tablet 3    losartan (COZAAR) 100 MG tablet Take 1 tablet (100 mg total) by mouth daily 90 tablet 3     No current facility-administered medications for this visit       Allergies   Allergen Reactions    Zoledronic Acid       Immunizations:     Immunization History   Administered Date(s) Administered    COVID-19 MODERNA VACC 0 5 ML IM 03/02/2021, 03/30/2021    Influenza, high dose seasonal 0 7 mL 12/18/2018, 02/27/2020    Pneumococcal Conjugate 13-Valent 12/18/2018      Health Maintenance:         Topic Date Due    Hepatitis C Screening  Completed         Topic Date Due    DTaP,Tdap,and Td Vaccines (1 - Tdap) Never done    Pneumococcal Vaccine: 65+ Years (2 - PPSV23 or PCV20) 12/18/2019    COVID-19 Vaccine (3 - Booster for Moderna series) 08/30/2021    Influenza Vaccine (Season Ended) 09/01/2022      Medicare Health Risk Assessment:     /78   Pulse 82   Temp (!) 97 °F (36 1 °C) (Temporal)   Resp 16   Ht 4' 11" (1 499 m)   Wt 72 6 kg (160 lb)   SpO2 99%   BMI 32 32 kg/m²      Jayden Crowley is here for her Subsequent Wellness visit  Last Medicare Wellness visit information reviewed, patient interviewed and updates made to the record today  Health Risk Assessment:   Patient rates overall health as good  Patient feels that their physical health rating is same  Patient is very satisfied with their life  Eyesight was rated as same  Hearing was rated as same  Patient feels that their emotional and mental health rating is same  Patients states they are never, rarely angry  Patient states they are sometimes unusually tired/fatigued  Pain experienced in the last 7 days has been a lot  Patient's pain rating has been 10/10  Patient states that she has experienced no weight loss or gain in last 6 months  Fall Risk Screening: In the past year, patient has experienced: no history of falling in past year      Urinary Incontinence Screening:   Patient has not leaked urine accidently in the last six months  Home Safety:  Patient does not have trouble with stairs inside or outside of their home  Patient has working smoke alarms and has working carbon monoxide detector  Home safety hazards include: none  Nutrition:   Current diet is Low Cholesterol, Low Saturated Fat, No Added Salt and Limited junk food  Medications:   Patient is not currently taking any over-the-counter supplements  Patient is able to manage medications  Activities of Daily Living (ADLs)/Instrumental Activities of Daily Living (IADLs):   Walk and transfer into and out of bed and chair?: Yes  Dress and groom yourself?: Yes    Bathe or shower yourself?: Yes    Feed yourself?  Yes  Do your laundry/housekeeping?: Yes  Manage your money, pay your bills and track your expenses?: Yes  Make your own meals?: Yes    Do your own shopping?: Yes    Durable Medical Equipment Suppliers  none    Previous Hospitalizations:   Any hospitalizations or ED visits within the last 12 months?: Yes    How many hospitalizations have you had in the last year?: 1-2    Hospitalization Comments: ER visit in 19 dizziness and extremely high blood pressure    Advance Care Planning:   Living will: No    Durable POA for healthcare: No    Advanced directive: No    Five wishes given: No      Cognitive Screening:   Provider or family/friend/caregiver concerned regarding cognition?: No    PREVENTIVE SCREENINGS      Cardiovascular Screening:    General: Screening Current      Diabetes Screening:     General: Screening Current      Breast Cancer Screening:     General: History Breast Cancer      Cervical Cancer Screening:    General: Screening Not Indicated      Osteoporosis Screening:      Due for: DXA Appendicular      Lung Cancer Screening:     General: Screening Not Indicated      Hepatitis C Screening:    General: Screening Current    Screening, Brief Intervention, and Referral to Treatment (SBIRT)    Screening  Typical number of drinks in a day: 0  Typical number of drinks in a week: 0  Interpretation: Low risk drinking behavior      AUDIT-C Screenin) How often did you have a drink containing alcohol in the past year? never  2) How many drinks did you have on a typical day when you were drinking in the past year? 0  3) How often did you have 6 or more drinks on one occasion in the past year? never    AUDIT-C Score: 0  Interpretation: Score 0-2 (female): Negative screen for alcohol misuse    Single Item Drug Screening:  How often have you used an illegal drug (including marijuana) or a prescription medication for non-medical reasons in the past year? never    Single Item Drug Screen Score: 0  Interpretation: Negative screen for possible drug use disorder    Other Counseling Topics:   Car/seat belt/driving safety, skin self-exam, sunscreen and calcium and vitamin D intake and regular weightbearing exercise         Tesfaye Burrell

## 2022-06-10 NOTE — PROGRESS NOTES
Assessment/Plan:    1  Pain and swelling of right wrist  -     Ambulatory Referral to Orthopedic Surgery; Future    2  Right wrist pain  -     Ambulatory Referral to Orthopedic Surgery; Future    3  Chronic right shoulder pain  -     Ambulatory Referral to Orthopedic Surgery; Future    4  Rotator cuff arthropathy of right shoulder  -     Ambulatory Referral to Orthopedic Surgery; Future    5  Osteoarthritis of both knees, unspecified osteoarthritis type    6  Primary hypertension  Assessment & Plan:  BP wnl  Taking losartan and amlodipine without issues  Stable, no changes  Reports yearly cardiology follow up  7  Acquired hypothyroidism  Assessment & Plan:  TSH wnl  Reviewed labs with patient  Continue levothyroxine as directed  8  Pre-diabetes    9  History of breast cancer  Assessment & Plan:  Stable, no changes  10  Obesity, morbid ([facility] [address] )  Assessment & Plan:  Encourage healthy nutrition and exercise as tolerated  Return in about 6 months (around 12/10/2022) for Recheck  Subjective:      Patient ID: Ramsey Hollis is a 76 y o  female  Chief Complaint   Patient presents with    Medicare Wellness Visit       Reggie Rose a 76year old female who presents to the office for her annual medicare wellness exam  Reports arthritis and chronic joint pain  Reports lower back pain that is chronic and at baseline for her  Reports acute on chronic right shoulder pain and decreased range of motion  Denies fever, chills, chest pain, shortness of breath, n/v/d  The following portions of the patient's history were reviewed and updated as appropriate: allergies, current medications, past family history, past medical history, past social history, past surgical history and problem list     Review of Systems   Constitutional: Negative for diaphoresis, fatigue and fever  HENT: Negative for ear pain and hearing loss  Eyes: Negative for pain and visual disturbance     Respiratory: Negative for chest tightness and shortness of breath  Cardiovascular: Negative for chest pain, palpitations and leg swelling  Gastrointestinal: Negative for abdominal pain, constipation and diarrhea  Genitourinary: Negative for difficulty urinating  Musculoskeletal: Positive for arthralgias, back pain and myalgias  Skin: Negative for rash  Neurological: Negative for dizziness, numbness and headaches  Psychiatric/Behavioral: Negative for sleep disturbance  Current Outpatient Medications   Medication Sig Dispense Refill    amLODIPine (NORVASC) 5 mg tablet Take 5 mg by mouth in the morning   levothyroxine 88 mcg tablet Take 1 tablet (88 mcg total) by mouth daily 90 tablet 3    losartan (COZAAR) 100 MG tablet Take 1 tablet (100 mg total) by mouth daily 90 tablet 3     No current facility-administered medications for this visit  Objective:    /78   Pulse 82   Temp (!) 97 °F (36 1 °C) (Temporal)   Resp 16   Ht 4' 11" (1 499 m)   Wt 72 6 kg (160 lb)   SpO2 99%   BMI 32 32 kg/m²        Physical Exam  Vitals reviewed  Constitutional:       General: She is not in acute distress  Appearance: Normal appearance  She is well-developed  She is not diaphoretic  HENT:      Head: Normocephalic and atraumatic  Eyes:      General:         Right eye: No discharge  Left eye: No discharge  Conjunctiva/sclera: Conjunctivae normal    Neck:      Thyroid: No thyromegaly  Cardiovascular:      Rate and Rhythm: Normal rate and regular rhythm  Heart sounds: Normal heart sounds  Pulmonary:      Effort: Pulmonary effort is normal  No respiratory distress  Breath sounds: Normal breath sounds  No decreased breath sounds, wheezing, rhonchi or rales  Musculoskeletal:      Cervical back: Normal range of motion and neck supple  Lymphadenopathy:      Cervical: No cervical adenopathy  Skin:     General: Skin is warm and dry  Findings: No rash     Neurological: General: No focal deficit present  Mental Status: She is alert and oriented to person, place, and time  Motor: Motor function is intact  Deep Tendon Reflexes: Reflexes are normal and symmetric  Psychiatric:         Behavior: Behavior normal          Thought Content:  Thought content normal          Judgment: Judgment normal                 YOANA Mann

## 2022-06-18 ENCOUNTER — HOSPITAL ENCOUNTER (OUTPATIENT)
Dept: RADIOLOGY | Facility: HOSPITAL | Age: 77
Discharge: HOME | End: 2022-06-18
Payer: MEDICARE

## 2022-06-18 DIAGNOSIS — M12.811 ROTATOR CUFF ARTHROPATHY OF RIGHT SHOULDER: ICD-10-CM

## 2022-06-18 PROCEDURE — G1004 CDSM NDSC: HCPCS

## 2022-06-18 PROCEDURE — 73221 MRI JOINT UPR EXTREM W/O DYE: CPT

## 2022-06-21 ENCOUNTER — OFFICE VISIT (OUTPATIENT)
Dept: OBGYN CLINIC | Facility: CLINIC | Age: 77
End: 2022-06-21
Payer: MEDICARE

## 2022-06-21 VITALS — HEIGHT: 59 IN | BODY MASS INDEX: 32.25 KG/M2 | WEIGHT: 160 LBS

## 2022-06-21 DIAGNOSIS — M25.531 RIGHT WRIST PAIN: ICD-10-CM

## 2022-06-21 DIAGNOSIS — G89.29 CHRONIC RIGHT SHOULDER PAIN: ICD-10-CM

## 2022-06-21 DIAGNOSIS — M25.511 CHRONIC RIGHT SHOULDER PAIN: ICD-10-CM

## 2022-06-21 DIAGNOSIS — M12.811 ROTATOR CUFF ARTHROPATHY OF RIGHT SHOULDER: ICD-10-CM

## 2022-06-21 DIAGNOSIS — M18.11 OSTEOARTHRITIS OF CARPOMETACARPAL (CMC) JOINT OF RIGHT THUMB, UNSPECIFIED OSTEOARTHRITIS TYPE: ICD-10-CM

## 2022-06-21 PROCEDURE — 20600 DRAIN/INJ JOINT/BURSA W/O US: CPT | Performed by: ORTHOPAEDIC SURGERY

## 2022-06-21 PROCEDURE — 20610 DRAIN/INJ JOINT/BURSA W/O US: CPT | Performed by: ORTHOPAEDIC SURGERY

## 2022-06-21 PROCEDURE — 99204 OFFICE O/P NEW MOD 45 MIN: CPT | Performed by: ORTHOPAEDIC SURGERY

## 2022-06-21 RX ORDER — DEXAMETHASONE SODIUM PHOSPHATE 100 MG/10ML
40 INJECTION INTRAMUSCULAR; INTRAVENOUS
Status: COMPLETED | OUTPATIENT
Start: 2022-06-21 | End: 2022-06-21

## 2022-06-21 RX ORDER — TRIAMCINOLONE ACETONIDE 40 MG/ML
20 INJECTION, SUSPENSION INTRA-ARTICULAR; INTRAMUSCULAR
Status: COMPLETED | OUTPATIENT
Start: 2022-06-21 | End: 2022-06-21

## 2022-06-21 RX ORDER — BUPIVACAINE HYDROCHLORIDE 2.5 MG/ML
4 INJECTION, SOLUTION INFILTRATION; PERINEURAL
Status: COMPLETED | OUTPATIENT
Start: 2022-06-21 | End: 2022-06-21

## 2022-06-21 RX ORDER — LIDOCAINE HYDROCHLORIDE 10 MG/ML
0.5 INJECTION, SOLUTION INFILTRATION; PERINEURAL
Status: COMPLETED | OUTPATIENT
Start: 2022-06-21 | End: 2022-06-21

## 2022-06-21 RX ADMIN — DEXAMETHASONE SODIUM PHOSPHATE 40 MG: 100 INJECTION INTRAMUSCULAR; INTRAVENOUS at 12:04

## 2022-06-21 RX ADMIN — TRIAMCINOLONE ACETONIDE 20 MG: 40 INJECTION, SUSPENSION INTRA-ARTICULAR; INTRAMUSCULAR at 12:04

## 2022-06-21 RX ADMIN — LIDOCAINE HYDROCHLORIDE 0.5 ML: 10 INJECTION, SOLUTION INFILTRATION; PERINEURAL at 12:04

## 2022-06-21 RX ADMIN — BUPIVACAINE HYDROCHLORIDE 4 ML: 2.5 INJECTION, SOLUTION INFILTRATION; PERINEURAL at 12:04

## 2022-06-21 NOTE — LETTER
June 21, 2022     YOANA Mann  [address] [facility] [address]    Patient: Royce Lyles   YOB: 1945   Date of Visit: 6/21/2022     Dear Dr Hai Clinton      Thank you for referring Royce Lyles to me for evaluation  Below are the relevant portions of my assessment and plan of care  If you have questions, please do not hesitate to call me  I look forward to following Pastora Michael along with you           Sincerely,        Rosa M Frost MD        CC: No Recipients    Progress Notes:

## 2022-06-21 NOTE — PROGRESS NOTES
Assessment/Plan:  1  Rotator cuff arthropathy of right shoulder  Ambulatory Referral to Orthopedic Surgery    Ambulatory Referral to Orthopedic Surgery    Large joint arthrocentesis: R glenohumeral   2  Osteoarthritis of carpometacarpal (CMC) joint of right thumb, unspecified osteoarthritis type  Ambulatory Referral to Orthopedic Surgery    Small joint arthrocentesis: R thumb CMC   3  Right wrist pain  Ambulatory Referral to Orthopedic Surgery   4  Chronic right shoulder pain  Ambulatory Referral to Orthopedic Surgery       Scribe Attestation    I,:  Antoinette Long am acting as a scribe while in the presence of the attending physician :       I,:  Lo Serna MD personally performed the services described in this documentation    as scribed in my presence :           Kameronjami Almanza upon examination and review the MRI of the right shoulder does demonstrate rotator cuff tear arthropathy  She has a complete and retracted tear of the supraspinatus tendon with retraction the glenohumeral joint as well as fatty atrophy  She does also demonstrate severe glenohumeral joint osteoarthritis  Despite the findings on the MRI she does demonstrate high level function of the shoulder with functional range of motion and reasonable strength  I do believe that she is compensating quite well  I did note that surgical intervention for her issue would be a reverse total shoulder arthroplasty  However, due to her high level function I do not believe she is indicated for surgical intervention at this time  I did note that we could consider less invasive treatment options such as a intra-articular steroid injection  She verbalized understanding was amenable to a steroid injection  This was provided to her today into the glenohumeral joint  She tolerated the injection well no complications  She may continue with activities of daily living as tolerated    I will see her back on an as-needed basis in regards to her right shoulder  Ric Grant upon examination and review of the x-ray report of the right wrist does demonstrate severe carpometacarpal joint osteoarthritis of the thumb  She does demonstrate functional strength however is painful at the base of the thumb  I did note that she could also consider operative care for her issue in the form of a carpometacarpal joint arthroplasty to remove the arthritic bone provide her with symptomatic relief  I did also remark that due to her high level strength and overall function on exam today she could consider a less invasive treatment options such as a steroid injection into the carpometacarpal joint of the thumb  She verbalized understanding and opted for a right thumb carpometacarpal joint injection  This was provided to her today and she tolerated the injection well no complications  Post injection instructions were provided  She may follow-up on an as-needed basis  Small joint arthrocentesis: R thumb CMC  Odin Protocol:  Consent: Verbal consent obtained  Written consent not obtained  Risks and benefits: risks, benefits and alternatives were discussed  Consent given by: patient  Time out: Immediately prior to procedure a "time out" was called to verify the correct patient, procedure, equipment, support staff and site/side marked as required    Timeout called at: 6/21/2022 11:35 AM   Patient understanding: patient states understanding of the procedure being performed  Site marked: the operative site was marked  Patient identity confirmed: verbally with patient    Supporting Documentation  Indications: pain   Procedure Details  Location: thumb - R thumb CMC  Needle size: 25 G  Ultrasound guidance: no  Approach: volar  Medications administered: 0 5 mL lidocaine 1 %; 20 mg triamcinolone acetonide 40 mg/mL    Patient tolerance: patient tolerated the procedure well with no immediate complications  Dressing:  Sterile dressing applied    Large joint arthrocentesis: R glenohumeral  Universal Protocol:  Consent: Verbal consent obtained  Risks and benefits: risks, benefits and alternatives were discussed  Consent given by: patient  Timeout called at: 6/21/2022 11:36 AM   Patient understanding: patient states understanding of the procedure being performed  Site marked: the operative site was marked  Radiology Images displayed and confirmed  If images not available, report reviewed: imaging studies available  Patient identity confirmed: verbally with patient    Supporting Documentation  Indications: pain   Procedure Details  Location: shoulder - R glenohumeral  Preparation: Patient was prepped and draped in the usual sterile fashion  Needle size: 22 G  Ultrasound guidance: no  Approach: anterior  Medications administered: 4 mL bupivacaine 0 25 %; 40 mg dexamethasone 100 mg/10 mL    Patient tolerance: patient tolerated the procedure well with no immediate complications  Dressing:  Sterile dressing applied          Subjective:   Chris Nix is a 76 y o  female who presents to the office today for evaluation of her right shoulder  She is referred to me today by Pelon Vivas  She has been activity related pain into her shoulder over the past several months  She a traumatic injury resulting symptoms  She states that overhead reaching activities will exacerbate her pain  She describes mild aching  She does also localized pain to the aspect of her upper arm  She notes intermittently shoulder clicking popping  She states that when she is able to reach overhead she does have symptomatic regards to her pain  She denies any distal paresthesias of right upper extremity  She has not physical therapy for her right shoulder  She did have an MRI of the right shoulder completed to be reviewed today  She does also have complaints of right wrist thumb pain  This has also been ongoing issue many months  She denies a traumatic injury resulting her painful symptoms    She states that few weeks ago she did have significant swelling  She denies any redness or fevers  She states that gripping activities will exacerbate her pain  She states that this is problematic as she is right handed  She does have a history of osteoarthritis in other joints as well as other fingers  There is also family medical history of osteoarthritis  She denies any distal paresthesias into the wrist or hand  She did have an x-ray completed of the right wrist and brought the report with her today  Review of Systems   Constitutional: Negative for chills, fever and unexpected weight change  HENT: Negative for hearing loss, nosebleeds and sore throat  Eyes: Negative for pain, redness and visual disturbance  Respiratory: Negative for cough, shortness of breath and wheezing  Cardiovascular: Negative for chest pain, palpitations and leg swelling  Gastrointestinal: Negative for abdominal pain, nausea and vomiting  Endocrine: Negative for polydipsia and polyuria  Genitourinary: Negative for dysuria and hematuria  Musculoskeletal: Positive for arthralgias (Right thumb)  See HPI   Skin: Negative for rash and wound  Neurological: Negative for dizziness, numbness and headaches  Psychiatric/Behavioral: Negative for decreased concentration and suicidal ideas  The patient is not nervous/anxious            Past Medical History:   Diagnosis Date    Cancer ([facility] [address] )     Disease of thyroid gland     Hyperlipidemia     Hypertension     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 ([facility] [address] )        Past Surgical History:   Procedure Laterality Date    BREAST LUMPECTOMY Right     HYSTERECTOMY      partial - age 42       Family History   Problem Relation Age of Onset    Other Mother         DJD, cervical    Diabetes Father         DM    Autism Son     Substance Abuse Neg Hx     Mental illness Neg Hx        Social History     Occupational History    Not on file   Tobacco Use    Smoking status: Never Smoker    Smokeless tobacco: Never Used   Vaping Use    Vaping Use: Never used   Substance and Sexual Activity    Alcohol use: No    Drug use: No    Sexual activity: Not on file         Current Outpatient Medications:     amLODIPine (NORVASC) 5 mg tablet, Take 5 mg by mouth in the morning , Disp: , Rfl:     levothyroxine 88 mcg tablet, Take 1 tablet (88 mcg total) by mouth daily, Disp: 90 tablet, Rfl: 3    losartan (COZAAR) 100 MG tablet, Take 1 tablet (100 mg total) by mouth daily, Disp: 90 tablet, Rfl: 3    Allergies   Allergen Reactions    Zoledronic Acid        Objective: There were no vitals filed for this visit  Right Hand Exam     Tenderness   Right hand tenderness location: CMC thumb  Range of Motion   Wrist   Extension: 50   Flexion: 80     Muscle Strength   Right wrist normal muscle strength: APB: 4+/5  Wrist extension: 5/5   Wrist flexion: 5/5   : 5/5     Other   Erythema: absent  Scars: absent  Sensation: normal  Pulse: present      Right Shoulder Exam     Tenderness   Right shoulder tenderness location: lateral shoulder  Range of Motion   Active abduction: 160   External rotation: 60     Muscle Strength   Abduction: 4/5   Internal rotation: 3/5   External rotation: 3/5     Other   Erythema: absent  Scars: absent  Sensation: normal  Pulse: present          Strength/Myotome Testing     Right Wrist/Hand   Right wrist normal muscle strength: APB: 4+/5  Wrist extension: 5  Wrist flexion: 5      Physical Exam  Vitals reviewed  HENT:      Head: Normocephalic and atraumatic  Eyes:      General:         Right eye: No discharge  Left eye: No discharge  Conjunctiva/sclera: Conjunctivae normal       Pupils: Pupils are equal, round, and reactive to light  Cardiovascular:      Rate and Rhythm: Normal rate  Pulmonary:      Effort: Pulmonary effort is normal  No respiratory distress     Musculoskeletal:      Cervical back: Normal range of motion and neck supple  Comments: As noted in HPI   Skin:     General: Skin is warm and dry  Neurological:      Mental Status: She is alert and oriented to person, place, and time  I have personally reviewed pertinent films in PACS and my interpretation is as follows:    MRI of the right shoulder demonstrates complete tears of the supraspinatus with retraction to the glenohumeral joint  Moderate to severe fatty atrophy of the supraspinatus muscle belly  Severe glenohumeral joint osteoarthritis      Xray report of the right wrist demonstrates severe osteoarthritis of the carpometacarpal joint of the thumb

## 2023-01-24 ENCOUNTER — OFFICE VISIT (OUTPATIENT)
Dept: OBGYN CLINIC | Facility: CLINIC | Age: 78
End: 2023-01-24

## 2023-01-24 VITALS
WEIGHT: 145 LBS | SYSTOLIC BLOOD PRESSURE: 146 MMHG | HEART RATE: 66 BPM | DIASTOLIC BLOOD PRESSURE: 79 MMHG | BODY MASS INDEX: 29.23 KG/M2 | HEIGHT: 59 IN

## 2023-01-24 DIAGNOSIS — M12.811 ROTATOR CUFF ARTHROPATHY OF RIGHT SHOULDER: Primary | ICD-10-CM

## 2023-01-24 DIAGNOSIS — Z01.812 ENCOUNTER FOR PREPROCEDURAL LABORATORY EXAMINATION: ICD-10-CM

## 2023-01-24 RX ORDER — MELATONIN
1000 DAILY
COMMUNITY

## 2023-01-24 NOTE — PROGRESS NOTES
Assessment/Plan:  1  Rotator cuff arthropathy of right shoulder  CT upper extremity wo contrast right    Case request operating room: Reverse Total Shoulder Arthroplasty - SAME DAY DISCHARGE    Ambulatory referral to [facility]    CBC and differential    APTT    Basic metabolic panel    Type and screen    Protime-INR    MRSA culture    EKG 12 lead    CBC and differential    APTT    Basic metabolic panel    Protime-INR    MRSA culture    Case request operating room: Reverse Total Shoulder Arthroplasty - SAME DAY DISCHARGE      2  Encounter for preprocedural laboratory examination  Ambulatory referral to [facility]    CBC and differential    APTT    Basic metabolic panel    Type and screen    Protime-INR    MRSA culture    EKG 12 lead    CBC and differential    APTT    Basic metabolic panel    Protime-INR    MRSA culture        The patient has ongoing pain about the shoulder as well as progressive worsening of her function with this shoulder  As previously discussed, she would like to proceed with right reverse total shoulder arthroplasty  She will need pre-operative medical clearance and pre-operative lab work  Given her history of right breast cancer, status post right lumpectomy and lymph node dissection, she is at higher risk for post-operative swelling, blood clots, and infection  She understands that this is a major operation  We discussed the procedure and risks at length, including but not limited to, infection, bleeding, wound issues, nerve injury, blood vessel injury, blood clot, stiffness, fracture, dislocation, failure of procedure, and need for additional surgery  We will see the patient back at the time of surgery  Subjective:   Prashant Mclaughlin is a 76 y o  female who presents today for follow-up of her right shoulder   She did have an MRI back in June which showed Severe arthritis of the shoulder as well as full thickness retracted rotator cuff tears with moderate atrophy and complete biceps tendon tear with distal retraction  She did have a steroid injection back in June which helped for about 4 months  We had discussed reverse total shoulder arthroplasty at last visit, however the patient was had quite good function of her shoulder at that time and thus we had opted for non-operative treatment  Unfortunately she notes her symptoms are getting progressively worse  She notes more weakness and limitation in ROM since last visit  Her pain is diffuse about the shoulder and is worse with activity and better with rest  She notes good sensation of the right upper extremity  Review of Systems   Constitutional: Negative  Negative for chills and fever  HENT: Negative  Negative for ear pain and sore throat  Eyes: Negative  Negative for pain and redness  Respiratory: Negative  Negative for shortness of breath and wheezing  Cardiovascular: Negative for chest pain and palpitations  Gastrointestinal: Negative  Negative for abdominal pain and blood in stool  Endocrine: Negative  Negative for polydipsia and polyuria  Genitourinary: Negative  Negative for difficulty urinating and dysuria  Musculoskeletal:        As noted in HPI   Skin: Negative  Negative for pallor and rash  Neurological: Negative  Negative for dizziness and numbness  Hematological: Negative  Negative for adenopathy  Does not bruise/bleed easily  Psychiatric/Behavioral: Negative  Negative for confusion and suicidal ideas           Past Medical History:   Diagnosis Date   • Cancer [facility])    • Disease of thyroid gland    • Hyperlipidemia    • Hypertension    • 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 [facility])        Past Surgical History:   Procedure Laterality Date   • BREAST LUMPECTOMY Right    • HYSTERECTOMY      partial - age 42       Family History   Problem Relation Age of Onset   • Other Mother         DJD, cervical   • Diabetes Father         DM   • Autism Son    • Substance Abuse Neg Hx    • Mental illness Neg Hx        Social History     Occupational History   • Not on file   Tobacco Use   • Smoking status: Never   • Smokeless tobacco: Never   Vaping Use   • Vaping Use: Never used   Substance and Sexual Activity   • Alcohol use: No   • Drug use: No   • Sexual activity: Not on file         Current Outpatient Medications:   •  amLODIPine (NORVASC) 5 mg tablet, Take 5 mg by mouth in the morning , Disp: , Rfl:   •  CALCIUM PO, Take by mouth, Disp: , Rfl:   •  cholecalciferol (VITAMIN D3) 1,000 units tablet, Take 1,000 Units by mouth daily, Disp: , Rfl:   •  levothyroxine 88 mcg tablet, Take 1 tablet (88 mcg total) by mouth daily, Disp: 90 tablet, Rfl: 3  •  losartan (COZAAR) 100 MG tablet, Take 1 tablet (100 mg total) by mouth daily, Disp: 90 tablet, Rfl: 3    Allergies   Allergen Reactions   • Zoledronic Acid        Objective:  Vitals:    01/24/23 1124   BP: 146/79   Pulse: 66       Right Shoulder Exam     Tenderness   The patient is experiencing no tenderness  Range of Motion   External rotation: 30   Forward flexion: 90   Internal rotation 0 degrees: Sacrum     Muscle Strength   Abduction: 4/5   Internal rotation: 4/5   External rotation: 3/5     Other   Erythema: absent  Sensation: normal  Pulse: present    Comments:  Scar right axilla and right breast              Physical Exam  Constitutional:       General: She is not in acute distress  Appearance: She is well-developed  HENT:      Head: Normocephalic and atraumatic  Eyes:      General: No scleral icterus  Conjunctiva/sclera: Conjunctivae normal    Neck:      Vascular: No JVD  Cardiovascular:      Rate and Rhythm: Normal rate  Pulmonary:      Effort: Pulmonary effort is normal  No respiratory distress  Skin:     General: Skin is warm  Neurological:      Mental Status: She is alert and oriented to person, place, and time        Coordination: Coordination normal        MRI of the right shoulder is once again reviewed from June 2022 demonstrating rotator cuff tear arthropathy with massive tears to the supraspinatus and infraspinatus and high riding humeral head and significant atrophy of particularly the supraspinatus and infraspinatus muscle bellies  There is also full-thickness tear of long head of biceps tendon and significant arthritic change  This document was created using speech voice recognition software  Grammatical errors, random word insertions, pronoun errors, and incomplete sentences are an occasional consequence of this system due to software limitations, ambient noise, and hardware issues  Any formal questions or concerns about content, text, or information contained within the body of this dictation should be directly addressed to the provider for clarification

## 2023-01-27 ENCOUNTER — HOSPITAL ENCOUNTER (OUTPATIENT)
Dept: RADIOLOGY | Facility: HOSPITAL | Age: 78
Discharge: HOME | End: 2023-01-27

## 2023-01-27 ENCOUNTER — APPOINTMENT (OUTPATIENT)
Dept: LAB | Facility: HOSPITAL | Age: 78
End: 2023-01-27

## 2023-01-27 DIAGNOSIS — M12.811 ROTATOR CUFF ARTHROPATHY OF RIGHT SHOULDER: ICD-10-CM

## 2023-01-27 DIAGNOSIS — M75.101 ROTATOR CUFF TEAR ARTHROPATHY OF BOTH SHOULDERS: ICD-10-CM

## 2023-01-27 DIAGNOSIS — M75.102 ROTATOR CUFF TEAR ARTHROPATHY OF BOTH SHOULDERS: ICD-10-CM

## 2023-01-27 DIAGNOSIS — M12.811 ROTATOR CUFF TEAR ARTHROPATHY OF BOTH SHOULDERS: ICD-10-CM

## 2023-01-27 DIAGNOSIS — Z01.812 ENCOUNTER FOR PREPROCEDURAL LABORATORY EXAMINATION: ICD-10-CM

## 2023-01-27 DIAGNOSIS — M12.812 ROTATOR CUFF TEAR ARTHROPATHY OF BOTH SHOULDERS: ICD-10-CM

## 2023-01-27 LAB
ABO GROUP BLD: NORMAL
ANION GAP SERPL CALCULATED.3IONS-SCNC: 12 MMOL/L (ref 4–13)
APTT PPP: 35 SECONDS (ref 23–37)
BLD GP AB SCN SERPL QL: NEGATIVE
BUN SERPL-MCNC: 15 MG/DL (ref 5–25)
CALCIUM SERPL-MCNC: 9.9 MG/DL (ref 8.3–10.1)
CHLORIDE SERPL-SCNC: 104 MMOL/L (ref 96–108)
CO2 SERPL-SCNC: 27 MMOL/L (ref 21–32)
CREAT SERPL-MCNC: 0.52 MG/DL (ref 0.6–1.3)
GFR SERPL CREATININE-BSD FRML MDRD: 91 ML/MIN/1.73SQ M
GLUCOSE P FAST SERPL-MCNC: 95 MG/DL (ref 65–99)
INR PPP: 1.03 (ref 0.84–1.19)
POTASSIUM SERPL-SCNC: 4 MMOL/L (ref 3.5–5.3)
PROTHROMBIN TIME: 13.6 SECONDS (ref 11.6–14.5)
RH BLD: POSITIVE
SODIUM SERPL-SCNC: 143 MMOL/L (ref 135–147)
SPECIMEN EXPIRATION DATE: NORMAL

## 2023-01-28 LAB — MRSA NOSE QL CULT: NORMAL

## 2023-01-29 LAB
ATRIAL RATE: 81 BPM
P AXIS: 46 DEGREES
PR INTERVAL: 156 MS
QRS AXIS: 45 DEGREES
QRSD INTERVAL: 88 MS
QT INTERVAL: 388 MS
QTC INTERVAL: 450 MS
T WAVE AXIS: 32 DEGREES
VENTRICULAR RATE: 81 BPM

## 2023-02-13 NOTE — PRE-PROCEDURE INSTRUCTIONS
My Surgical Experience    The following information was developed to assist you to prepare for your operation  What do I need to do before coming to the hospital?  • Arrange for a responsible person to drive you to and from the hospital   • Arrange care for your children at home  Children are not allowed in the recovery areas of the hospital  • Plan to wear clothing that is easy to put on and take off  If you are having shoulder surgery, wear a shirt that buttons or zippers in the front  Bathing  o Shower the evening before and the morning of your surgery with an antibacterial soap  Please refer to the Pre Op Showering Instructions for Surgery Patients Sheet   o Remove nail polish and all body piercing jewelry  o Do not shave any body part for at least 24 hours before surgery-this includes face, arms, legs and upper body  Food  o Nothing to eat or drink after midnight the night before your surgery  This includes candy and chewing gum  o Exception: If your surgery is after 12:00pm (noon), you may have clear liquids such as 7-Up®, ginger ale, apple or cranberry juice, Jell-O®, water, or clear broth until 8:00 am  o Do not drink milk or juice with pulp on the morning before surgery  o Do not drink alcohol 24 hours before surgery  Medicine  o Follow instructions you received from your surgeon about which medicines you may take on the day of surgery  o If instructed to take medicine on the morning of surgery, take pills with just a small sip of water  Call your prescribing doctor for specific infroamtion on what to do if you take insulin    What should I bring to the hospital?    Bring:  • Crutches or a walker, if you have them, for foot or knee surgery  • A list of the daily medicines, vitamins, minerals, herbals and nutritional supplements you take   Include the dosages of medicines and the time you take them each day  • Glasses, dentures or hearing aids  • Minimal clothing; you will be wearing hospital sleepwear  • Photo ID; required to verify your identity  • If you have a Living Will or Power of , bring a copy of the documents  • If you have an ostomy, bring an extra pouch and any supplies you use    Do not bring  • Medicines or inhalers  • Money, valuables or jewelry    What other information should I know about the day of surgery? • Notify your surgeons if you develop a cold, sore throat, cough, fever, rash or any other illness  • Report to the Ambulatory Surgical/Same Day Surgery Unit  • You will be instructed to stop at Registration only if you have not been pre-registered  • Inform your  fi they do not stay that they will be asked by the staff to leave a phone number where they can be reached  • Be available to be reached before surgery  In the event the operating room schedule changes, you may be asked to come in earlier or later than expected    *It is important to tell your doctor and others involved in your health care if you are taking or have been taking any non-prescription drugs, vitamins, minerals, herbals or other nutritional supplements  Any of these may interact with some food or medicines and cause a reaction      Pre-Surgery Instructions:   Medication Instructions   • amLODIPine (NORVASC) 5 mg tablet Take day of surgery  • CALCIUM PO Hold day of surgery  • cholecalciferol (VITAMIN D3) 1,000 units tablet Hold day of surgery  • levothyroxine 88 mcg tablet Take day of surgery  • losartan (COZAAR) 100 MG tablet Hold day of surgery

## 2023-02-15 ENCOUNTER — RA CDI HCC (OUTPATIENT)
Dept: OTHER | Facility: HOSPITAL | Age: 78
End: 2023-02-15

## 2023-02-15 NOTE — PROGRESS NOTES
[facility] [address]  coding opportunities       Chart reviewed, no opportunity found: CHART REVIEWED, NO OPPORTUNITY FOUND        Patients Insurance     Medicare Insurance: Medicare

## 2023-02-22 ENCOUNTER — OFFICE VISIT (OUTPATIENT)
Dept: FAMILY MEDICINE CLINIC | Facility: CLINIC | Age: 78
End: 2023-02-22

## 2023-02-22 VITALS
HEART RATE: 76 BPM | HEIGHT: 59 IN | WEIGHT: 147 LBS | SYSTOLIC BLOOD PRESSURE: 140 MMHG | DIASTOLIC BLOOD PRESSURE: 82 MMHG | TEMPERATURE: 98.3 F | BODY MASS INDEX: 29.64 KG/M2 | OXYGEN SATURATION: 98 %

## 2023-02-22 DIAGNOSIS — M12.811 ROTATOR CUFF ARTHROPATHY OF RIGHT SHOULDER: ICD-10-CM

## 2023-02-22 DIAGNOSIS — E03.9 ACQUIRED HYPOTHYROIDISM: ICD-10-CM

## 2023-02-22 DIAGNOSIS — Z01.818 PRE-OPERATIVE CLEARANCE: Primary | ICD-10-CM

## 2023-02-22 DIAGNOSIS — E66.01 OBESITY, MORBID (HCC): ICD-10-CM

## 2023-02-22 DIAGNOSIS — I10 PRIMARY HYPERTENSION: ICD-10-CM

## 2023-02-22 NOTE — PROGRESS NOTES
Assessment/Plan:    1  Pre-operative clearance  Assessment & Plan:  Pt is scheduled for a reverse total shoulder arthroplasty with Dr Margarita Mccarthy on 3/2/23  Reviewed PMH, recent labs and recent ECG  Pt is considered intermediate risk for surgery  Pt is medically cleared for procedure  2  Rotator cuff arthropathy of right shoulder    3  Obesity, morbid ([address] )  Assessment & Plan:  Encourage activity as tolerated  4  Acquired hypothyroidism  Assessment & Plan: Tolerating levothyroxine 88 mcg without issues  Stable  5  Primary hypertension  Assessment & Plan:  BP wnl in office today  Stable, continue medications as directed  Return in about 3 months (around 5/22/2023) for Recheck  Subjective:      Patient ID: Sana Marinelli is a [de-identified] y o  female  Chief Complaint   Patient presents with   • Eleazar Watts is an [de-identified]year old female with HTN, hypothyroidism and obesity and who presents to the office for a surgical clearance  Pt is scheduled for a reverse total shoulder arthroplasty with Dr Margarita Mccarthy on 3/2/23  Currently, she denies fever, chills, chest pain, SOB, n/v/d  Reports decreased range of motion of right shoulder and right shoulder pain  The following portions of the patient's history were reviewed and updated as appropriate: allergies, current medications, past family history, past medical history, past social history, past surgical history and problem list     Review of Systems   Constitutional: Negative for diaphoresis, fatigue and fever  HENT: Negative for ear pain and hearing loss  Eyes: Negative for pain and visual disturbance  Respiratory: Negative for chest tightness and shortness of breath  Cardiovascular: Negative for chest pain, palpitations and leg swelling  Gastrointestinal: Negative for abdominal pain, constipation and diarrhea  Musculoskeletal: Positive for arthralgias  Negative for myalgias  Skin: Negative for rash     Neurological: Negative for dizziness, numbness and headaches  Psychiatric/Behavioral: Negative for sleep disturbance  Current Outpatient Medications   Medication Sig Dispense Refill   • amLODIPine (NORVASC) 5 mg tablet Take 5 mg by mouth in the morning  • CALCIUM PO Take by mouth     • cholecalciferol (VITAMIN D3) 1,000 units tablet Take 1,000 Units by mouth daily     • levothyroxine 88 mcg tablet Take 1 tablet (88 mcg total) by mouth daily 90 tablet 3   • losartan (COZAAR) 100 MG tablet Take 1 tablet (100 mg total) by mouth daily 90 tablet 3     No current facility-administered medications for this visit  Objective:    /82 (BP Location: Left arm, Patient Position: Sitting, Cuff Size: Standard)   Pulse 76   Temp 98 3 °F (36 8 °C) (Temporal)   Ht 4' 11" (1 499 m)   Wt 66 7 kg (147 lb)   SpO2 98%   BMI 29 69 kg/m²        Physical Exam  Vitals reviewed  Constitutional:       General: She is not in acute distress  Appearance: Normal appearance  She is well-developed  She is not diaphoretic  HENT:      Head: Normocephalic and atraumatic  Right Ear: Tympanic membrane, ear canal and external ear normal       Left Ear: Tympanic membrane, ear canal and external ear normal       Mouth/Throat:      Mouth: Mucous membranes are moist       Pharynx: Oropharynx is clear  Eyes:      General: Lids are normal          Right eye: No discharge  Left eye: No discharge  Extraocular Movements: Extraocular movements intact  Conjunctiva/sclera: Conjunctivae normal       Pupils: Pupils are equal, round, and reactive to light  Neck:      Thyroid: No thyromegaly  Cardiovascular:      Rate and Rhythm: Normal rate and regular rhythm  Heart sounds: Normal heart sounds  Pulmonary:      Effort: Pulmonary effort is normal  No respiratory distress  Breath sounds: Normal breath sounds  No decreased breath sounds, wheezing, rhonchi or rales     Musculoskeletal:      Cervical back: Full passive range of motion without pain, normal range of motion and neck supple  Lymphadenopathy:      Cervical: No cervical adenopathy  Skin:     General: Skin is warm and dry  Findings: No rash  Neurological:      Mental Status: She is alert and oriented to person, place, and time  Psychiatric:         Behavior: Behavior normal          Thought Content:  Thought content normal          Judgment: Judgment normal                 YOANA You

## 2023-02-22 NOTE — ASSESSMENT & PLAN NOTE
Pt is scheduled for a reverse total shoulder arthroplasty with Dr Kelli Sarah on 3/2/23  Reviewed PMH, recent labs and recent ECG  Pt is considered intermediate risk for surgery  Pt is medically cleared for procedure

## 2023-03-01 ENCOUNTER — ANESTHESIA EVENT (OUTPATIENT)
Dept: PERIOP | Facility: HOSPITAL | Age: 78
End: 2023-03-01

## 2023-03-01 NOTE — ANESTHESIA PREPROCEDURE EVALUATION
Procedure:  Reverse Total Shoulder Arthroplasty - SAME DAY DISCHARGE (Right: Shoulder)    Relevant Problems   CARDIO   (+) Hypertension      ENDO   (+) Hypothyroidism      MUSCULOSKELETAL   (+) Osteoarthritis of knees, bilateral      NEURO/PSYCH   (+) History of breast cancer        Physical Exam    Airway    Mallampati score: II  TM Distance: >3 FB  Neck ROM: full     Dental       Cardiovascular      Pulmonary      Other Findings        Anesthesia Plan  ASA Score- 2     Anesthesia Type- general with ASA Monitors  Additional Monitors:   Airway Plan: ETT  Plan Factors-Exercise tolerance (METS): >4 METS  Chart reviewed  Patient is not a current smoker  Induction- intravenous  Postoperative Plan- Plan for postoperative opioid use  Informed Consent- Anesthetic plan and risks discussed with patient  I personally reviewed this patient with the CRNA  Discussed and agreed on the Anesthesia Plan with the CRNA  Roc Yo

## 2023-03-02 ENCOUNTER — HOSPITAL ENCOUNTER (OUTPATIENT)
Facility: HOSPITAL | Age: 78
Setting detail: OUTPATIENT SURGERY
Discharge: HOME | End: 2023-03-02
Attending: ORTHOPAEDIC SURGERY | Admitting: ORTHOPAEDIC SURGERY

## 2023-03-02 ENCOUNTER — APPOINTMENT (OUTPATIENT)
Dept: RADIOLOGY | Facility: HOSPITAL | Age: 78
End: 2023-03-02

## 2023-03-02 ENCOUNTER — ANESTHESIA (OUTPATIENT)
Dept: PERIOP | Facility: HOSPITAL | Age: 78
End: 2023-03-02

## 2023-03-02 VITALS
DIASTOLIC BLOOD PRESSURE: 60 MMHG | BODY MASS INDEX: 29.93 KG/M2 | SYSTOLIC BLOOD PRESSURE: 122 MMHG | WEIGHT: 148.2 LBS | RESPIRATION RATE: 16 BRPM | HEART RATE: 77 BPM | TEMPERATURE: 97.8 F | OXYGEN SATURATION: 95 %

## 2023-03-02 DIAGNOSIS — Z96.611 S/P REVERSE TOTAL SHOULDER ARTHROPLASTY, RIGHT: Primary | ICD-10-CM

## 2023-03-02 LAB
ABO GROUP BLD: NORMAL
BLD GP AB SCN SERPL QL: NEGATIVE
RH BLD: POSITIVE
SPECIMEN EXPIRATION DATE: NORMAL

## 2023-03-02 DEVICE — DELTA XTEND LATERALIZED GLENOSPHERE +2MM STANDARD 038MM
Type: IMPLANTABLE DEVICE | Site: SHOULDER | Status: FUNCTIONAL
Brand: DELTA XTEND

## 2023-03-02 DEVICE — GLOBAL UNITE POROCOAT STANDARD STEM SIZE 10 113MM
Type: IMPLANTABLE DEVICE | Site: SHOULDER | Status: FUNCTIONAL
Brand: GLOBAL UNITE

## 2023-03-02 DEVICE — DELTA XTEND CEMENTLESS METAGLENE HA
Type: IMPLANTABLE DEVICE | Site: SHOULDER | Status: FUNCTIONAL
Brand: DELTA XTEND

## 2023-03-02 DEVICE — DELTA XTEND MODULAR 145 DEGREE EPIPHYSIS POROCOAT SIZE 1 - RIGHT
Type: IMPLANTABLE DEVICE | Site: SHOULDER | Status: FUNCTIONAL
Brand: DELTA XTEND

## 2023-03-02 RX ORDER — MIDAZOLAM HYDROCHLORIDE 2 MG/2ML
INJECTION, SOLUTION INTRAMUSCULAR; INTRAVENOUS AS NEEDED
Status: DISCONTINUED | OUTPATIENT
Start: 2023-03-02 | End: 2023-03-02

## 2023-03-02 RX ORDER — FENTANYL CITRATE 50 UG/ML
INJECTION, SOLUTION INTRAMUSCULAR; INTRAVENOUS AS NEEDED
Status: DISCONTINUED | OUTPATIENT
Start: 2023-03-02 | End: 2023-03-02

## 2023-03-02 RX ORDER — LIDOCAINE HYDROCHLORIDE 10 MG/ML
INJECTION, SOLUTION EPIDURAL; INFILTRATION; INTRACAUDAL; PERINEURAL AS NEEDED
Status: DISCONTINUED | OUTPATIENT
Start: 2023-03-02 | End: 2023-03-02

## 2023-03-02 RX ORDER — SODIUM CHLORIDE, SODIUM LACTATE, POTASSIUM CHLORIDE, CALCIUM CHLORIDE 600; 310; 30; 20 MG/100ML; MG/100ML; MG/100ML; MG/100ML
125 INJECTION, SOLUTION INTRAVENOUS CONTINUOUS
Status: DISCONTINUED | OUTPATIENT
Start: 2023-03-02 | End: 2023-03-02 | Stop reason: HOSPADM

## 2023-03-02 RX ORDER — PROPOFOL 10 MG/ML
INJECTION, EMULSION INTRAVENOUS AS NEEDED
Status: DISCONTINUED | OUTPATIENT
Start: 2023-03-02 | End: 2023-03-02

## 2023-03-02 RX ORDER — CEFAZOLIN SODIUM 2 G/50ML
2000 SOLUTION INTRAVENOUS ONCE
Status: DISCONTINUED | OUTPATIENT
Start: 2023-03-02 | End: 2023-03-02 | Stop reason: HOSPADM

## 2023-03-02 RX ORDER — FENTANYL CITRATE/PF 50 MCG/ML
25 SYRINGE (ML) INJECTION
Status: CANCELLED | OUTPATIENT
Start: 2023-03-02

## 2023-03-02 RX ORDER — GLYCOPYRROLATE 0.2 MG/ML
INJECTION INTRAMUSCULAR; INTRAVENOUS AS NEEDED
Status: DISCONTINUED | OUTPATIENT
Start: 2023-03-02 | End: 2023-03-02

## 2023-03-02 RX ORDER — OXYCODONE HYDROCHLORIDE 5 MG/1
5 TABLET ORAL EVERY 4 HOURS PRN
Qty: 20 TABLET | Refills: 0 | Status: ON HOLD | OUTPATIENT
Start: 2023-03-02 | End: 2023-03-09 | Stop reason: SDUPTHER

## 2023-03-02 RX ORDER — CEPHALEXIN 500 MG/1
500 CAPSULE ORAL EVERY 6 HOURS SCHEDULED
Qty: 4 CAPSULE | Refills: 0 | Status: SHIPPED | OUTPATIENT
Start: 2023-03-02 | End: 2023-03-03

## 2023-03-02 RX ORDER — MAGNESIUM HYDROXIDE 1200 MG/15ML
LIQUID ORAL AS NEEDED
Status: DISCONTINUED | OUTPATIENT
Start: 2023-03-02 | End: 2023-03-02 | Stop reason: HOSPADM

## 2023-03-02 RX ORDER — DEXAMETHASONE SODIUM PHOSPHATE 4 MG/ML
INJECTION, SOLUTION INTRA-ARTICULAR; INTRALESIONAL; INTRAMUSCULAR; INTRAVENOUS; SOFT TISSUE AS NEEDED
Status: DISCONTINUED | OUTPATIENT
Start: 2023-03-02 | End: 2023-03-02

## 2023-03-02 RX ORDER — ASPIRIN 325 MG
325 TABLET, DELAYED RELEASE (ENTERIC COATED) ORAL DAILY
Qty: 30 TABLET | Refills: 0
Start: 2023-03-02

## 2023-03-02 RX ORDER — EPHEDRINE SULFATE 50 MG/ML
INJECTION INTRAVENOUS AS NEEDED
Status: DISCONTINUED | OUTPATIENT
Start: 2023-03-02 | End: 2023-03-02

## 2023-03-02 RX ORDER — ONDANSETRON 2 MG/ML
4 INJECTION INTRAMUSCULAR; INTRAVENOUS ONCE AS NEEDED
Status: CANCELLED | OUTPATIENT
Start: 2023-03-02

## 2023-03-02 RX ORDER — ONDANSETRON 2 MG/ML
INJECTION INTRAMUSCULAR; INTRAVENOUS AS NEEDED
Status: DISCONTINUED | OUTPATIENT
Start: 2023-03-02 | End: 2023-03-02

## 2023-03-02 RX ORDER — BUPIVACAINE HYDROCHLORIDE 5 MG/ML
INJECTION, SOLUTION PERINEURAL
Status: DISCONTINUED | OUTPATIENT
Start: 2023-03-02 | End: 2023-03-02

## 2023-03-02 RX ORDER — ROCURONIUM BROMIDE 10 MG/ML
INJECTION, SOLUTION INTRAVENOUS AS NEEDED
Status: DISCONTINUED | OUTPATIENT
Start: 2023-03-02 | End: 2023-03-02

## 2023-03-02 RX ORDER — NEOSTIGMINE METHYLSULFATE 1 MG/ML
INJECTION INTRAVENOUS AS NEEDED
Status: DISCONTINUED | OUTPATIENT
Start: 2023-03-02 | End: 2023-03-02

## 2023-03-02 RX ORDER — ALBUMIN, HUMAN INJ 5% 5 %
SOLUTION INTRAVENOUS CONTINUOUS PRN
Status: DISCONTINUED | OUTPATIENT
Start: 2023-03-02 | End: 2023-03-02

## 2023-03-02 RX ORDER — CEFAZOLIN SODIUM 2 G/50ML
2000 SOLUTION INTRAVENOUS ONCE
Status: COMPLETED | OUTPATIENT
Start: 2023-03-02 | End: 2023-03-02

## 2023-03-02 RX ORDER — CEFAZOLIN SODIUM 1 G/3ML
INJECTION, POWDER, FOR SOLUTION INTRAMUSCULAR; INTRAVENOUS AS NEEDED
Status: DISCONTINUED | OUTPATIENT
Start: 2023-03-02 | End: 2023-03-02

## 2023-03-02 RX ADMIN — SODIUM CHLORIDE, SODIUM LACTATE, POTASSIUM CHLORIDE, AND CALCIUM CHLORIDE 125 ML/HR: .6; .31; .03; .02 INJECTION, SOLUTION INTRAVENOUS at 06:36

## 2023-03-02 RX ADMIN — FENTANYL CITRATE 25 MCG: 50 INJECTION, SOLUTION INTRAMUSCULAR; INTRAVENOUS at 09:23

## 2023-03-02 RX ADMIN — CEFAZOLIN 2000 MG: 1 INJECTION, POWDER, FOR SOLUTION INTRAMUSCULAR; INTRAVENOUS at 07:38

## 2023-03-02 RX ADMIN — ROCURONIUM BROMIDE 50 MG: 10 INJECTION, SOLUTION INTRAVENOUS at 07:35

## 2023-03-02 RX ADMIN — PHENYLEPHRINE HYDROCHLORIDE 20 MCG/MIN: 10 INJECTION INTRAVENOUS at 07:54

## 2023-03-02 RX ADMIN — MIDAZOLAM 1 MG: 1 INJECTION INTRAMUSCULAR; INTRAVENOUS at 07:19

## 2023-03-02 RX ADMIN — EPHEDRINE SULFATE 10 MG: 50 INJECTION, SOLUTION INTRAVENOUS at 08:23

## 2023-03-02 RX ADMIN — BUPIVACAINE HYDROCHLORIDE 15 ML: 5 INJECTION, SOLUTION PERINEURAL at 07:16

## 2023-03-02 RX ADMIN — FENTANYL CITRATE 25 MCG: 50 INJECTION, SOLUTION INTRAMUSCULAR; INTRAVENOUS at 08:43

## 2023-03-02 RX ADMIN — PROPOFOL 120 MG: 10 INJECTION, EMULSION INTRAVENOUS at 07:35

## 2023-03-02 RX ADMIN — MIDAZOLAM 1 MG: 1 INJECTION INTRAMUSCULAR; INTRAVENOUS at 07:32

## 2023-03-02 RX ADMIN — GLYCOPYRROLATE 0.2 MCG: 0.2 INJECTION, SOLUTION INTRAMUSCULAR; INTRAVENOUS at 09:25

## 2023-03-02 RX ADMIN — GLYCOPYRROLATE 0.4 MCG: 0.2 INJECTION, SOLUTION INTRAMUSCULAR; INTRAVENOUS at 09:26

## 2023-03-02 RX ADMIN — LIDOCAINE HYDROCHLORIDE 50 MG: 10 INJECTION, SOLUTION EPIDURAL; INFILTRATION; INTRACAUDAL; PERINEURAL at 07:35

## 2023-03-02 RX ADMIN — CEFAZOLIN SODIUM 2000 MG: 2 SOLUTION INTRAVENOUS at 13:27

## 2023-03-02 RX ADMIN — DEXAMETHASONE SODIUM PHOSPHATE 8 MG: 4 INJECTION, SOLUTION INTRAMUSCULAR; INTRAVENOUS at 07:35

## 2023-03-02 RX ADMIN — ONDANSETRON 4 MG: 2 INJECTION INTRAMUSCULAR; INTRAVENOUS at 07:35

## 2023-03-02 RX ADMIN — ALBUMIN (HUMAN): 12.5 INJECTION, SOLUTION INTRAVENOUS at 09:03

## 2023-03-02 RX ADMIN — NEOSTIGMINE METHYLSULFATE 3 MG: 1 INJECTION INTRAVENOUS at 09:26

## 2023-03-02 RX ADMIN — GLYCOPYRROLATE 0.2 MCG: 0.2 INJECTION, SOLUTION INTRAMUSCULAR; INTRAVENOUS at 07:39

## 2023-03-02 RX ADMIN — FENTANYL CITRATE 50 MCG: 50 INJECTION, SOLUTION INTRAMUSCULAR; INTRAVENOUS at 07:35

## 2023-03-02 RX ADMIN — EPHEDRINE SULFATE 5 MG: 50 INJECTION, SOLUTION INTRAVENOUS at 07:42

## 2023-03-02 RX ADMIN — SODIUM CHLORIDE, SODIUM LACTATE, POTASSIUM CHLORIDE, AND CALCIUM CHLORIDE: .6; .31; .03; .02 INJECTION, SOLUTION INTRAVENOUS at 09:32

## 2023-03-02 NOTE — OP NOTE
OPERATIVE REPORT  PATIENT NAME: Carin Mortimer    :  1945  MRN: [id number]  Pt Location: [facility] OR ROOM 03    SURGERY DATE: 3/2/2023    Surgeon(s) and Role:     * Tristian Martinez MD - Primary     * Tom Mcbride PA-C - Assisting necessary for the procedure for assistance with retraction of vital structures with assistance with preparation of glenohumeral joint for implantation of the prosthesis with assistance with placement of the prosthesis  Corrie Spain ATC and OTC second assistant    Preop Diagnosis:  Rotator cuff arthropathy of right shoulder [M12 811]    Post-Op Diagnosis Codes:     * Rotator cuff arthropathy of right shoulder [M12 811]    Procedure(s):  Right - Reverse Total Shoulder Arthroplasty utilizing DePuy delta extend cementless metaglene and 2 nonlocking screws and a lateralized glenosphere +2 mm 38 mm standard with a delta extend modular 145 degree epiphysis size 1 right Porocoat and a global unite Porocoat standard stem size 10 x 1 to 13 mm with a delta extend humeral polyethylene cup standard 38+3    Specimen(s):  * No specimens in log *    Estimated Blood Loss:   100 mL    Drains:  * No LDAs found *    Anesthesia Type:   General w/ Regional    Operative Indications:  Rotator cuff arthropathy of right shoulder Nissa Garay is an 66-year-old female who has been suffering with right shoulder rotator cuff tear arthropathy with severe disability and pain  She was found to have massive tears with significant retraction of supraspinatus and infraspinatus and long head of biceps as well as severe arthritic change with a very high riding humeral head  She had failed nonoperative measures such as therapy and anti-inflammatories and ice and activity modification and had severe disability and dysfunction  She understood the risks and benefits of a right reverse total shoulder arthroplasty and wished to go ahead    The risks are inclusive of but not limited to infection, stiffness, nerve or blood vessel injury with a numbness pain and weakness, fracture, dislocation, failure to achieve anticipated results, worsening of symptoms, failure to regain full strength and ability, difficulty with rotation of the shoulder, persistent pain and weakness, and need for further surgery  Operative Findings:  Right shoulder Examiner anesthesia demonstrated forward flexion to 110 degrees abduction to 100 degrees external rotation was to 30 degrees and internal rotation was to 10 degrees  Intra-articular findings demonstrated a nearly completely bald humeral head with no supraspinatus or infraspinatus or long head of biceps and very little remaining of the subscapularis  No articular cartilage remained along the humeral head or the glenoid with osteophytes almost circumferentially around the humeral head  Significant instability was found of the shoulder joint  We were however able to place a reverse total shoulder arthroplasty in 10 degrees of retroversion and we did have excellent stability of the prosthesis both along the glenoid and along the proximal humerus  We did however have a very small crack in the very superior aspect of the proximal humerus that did not propagate down  He did go through the bicipital groove as she has a very small proximal humerus and glenoid due to her small size  We did place the smallest prosthesis for the proximal portion of the humerus  We did not demonstrate any instability however the prosthesis and demonstrated no propagation of the crack down the humeral shaft  We did not place a cerclage wire proximally as the axillary nerve is immediately adjacent to the proximal humerus in the area of our dissection  Complications:   As mentioned in operative findings, there was a small crack that was noted about the proximal humerus that did not extend into the shaft  This was along the bicipital groove    The longstem prosthesis did appear to be quite stable upon range of motion at the end of the procedure  We did achieve forward flexion of 160 degrees abduction to 150 degrees external rotation to 80 degrees and internal rotation to 70 degrees without any signs of instability  We had a good tension along the conjoined tendon as well as along the deltoid without any obvious signs of overtensioning  We did leave the implant slightly proud to accommodate for the very small proximal humerus which enabled us to place a +3 polyethylene liner that gave us excellent fit and stability  Procedure and Technique:  Ignacio Zhao was taken to the operating room and placed supine on the OR table  She was given preoperative IV antibiotics as well as preoperative regional anesthesia by attending anesthesiologist   General anesthesia was induced and the right upper extremity was taken to examine anesthesia as described above  We did take her comfortably safely into the semibeachchair position with all parts well-padded and the head neutral   Right upper extremity was then prepped and draped in usual sterile fashion  A surgical timeout was taken  We began with a deltopectoral approach with a 15 blade through skin  We did dissect carefully down to the deltopectoral interval and took the cephalic vein laterally  We protected the vein throughout the procedure  We then came down upon the shoulder which demonstrated nearly no rotator cuff remaining anteriorly and massive tears that were irreparable of the supraspinatus and infraspinatus and long head of the biceps tendon  Did simply present itself to us as we move the shoulder after this dissection  We did not attempt to repair the subscapularis at the end of the operation as it was irreparable  We did have excellent exposure of the proximal humerus and did begin with the proximal humerus reamers into the shaft  We did find a size 10 fit very nicely for the reamer  We then utilized the cutting guide set at 10 degrees of retroversion    We did make a proximal humerus cut and placed a proximal humerus protector  We then exposed the glenoid and removed any remaining labrum circumferentially  There was no biceps that was present  The glenoid articular cartilage was completely worn down  We had removed also some osteophytes off of the proximal humerus prior to the humeral cut  We had good exposure of the glenoid and then utilized the glenoid guide to place a guidepin in the center of the glenoid from anterior posterior although slightly towards more inferior aspect of the glenoid  We then utilized the power reamer followed by a very small rondure for the very small lift proximally in the glenoid  This was a very small glenoid as it was also a very small proximal humerus  We did place a standard central drill  We then irrigated and placed the standard metaglene  She did tolerate 18 mm screws proximally and distally with excellent purchase  These are nonlocking as this was a very small glenoid  We did not have good purchase anteriorly or posteriorly for the screw holes and thus left those blank  We did however have excellent purchase with the central peg and with the 2 screws proximally and distally in the metaglene  We then irrigated further  We did expose the proximal humerus and utilized the size 37 mm acetabular reamer followed by a 38 and then a 39  The proximal humerus bone was somewhat deficient posteriorly however laterally and anteriorly had good bone stock  We did place the glenosphere and engaged that quite nicely  It was very stable  We then exposed once again the proximal humerus and did find a very small crack through the bicipital groove  It did not propagate distally  We did trial a stem and 10 degrees of retroversion with the size 1 right epiphysis  We did trial a +3 polyethylene and felt that this was appropriate  We then removed the trial prosthesis and irrigated thoroughly    We did place a real stem and modular 145 degree epiphysis size 1 right  We did place this and impacted it in 10 degrees of retroversion  With excellent fixation and stability of the prosthesis with good fit  Once again we did check that small crack proximally which did not propagate itself distally  We had excellent range of motion of the prosthesis with no signs of instability and good tension along the deltoid and conjoined tendon  No signs of overtensioning  We then irrigated further and did not repair of the subscapularis as this was irreparable  We did closed the deltopectoral interval with 0 Vicryl suture followed by 2-0 Vicryl and 4-0 Vicryl and skin glue  Dry, sterile dressings were applied with a sling  She tolerated procedure well and transferred to recovery room in stable condition  She will be discharged home today after receiving a second dose of IV antibiotics  She will not be performing physical therapy for the first 6 weeks as we would like her to rest the arm to make certain that this small crack in the bone proximally does not propagate  She will however have an x-ray in the recovery room     I was present for the entire procedure and A qualified resident physician was not available    Patient Disposition:  PACU         SIGNATURE: Burak Timmons MD  DATE: March 2, 2023  TIME: 9:31 AM

## 2023-03-02 NOTE — ANESTHESIA POSTPROCEDURE EVALUATION
Post-Op Assessment Note    CV Status:  Stable  Pain Score: 0    Pain management: adequate     Mental Status:  Alert and awake   Hydration Status:  Euvolemic   PONV Controlled:  Controlled   Airway Patency:  Patent      Post Op Vitals Reviewed: Yes      Staff: CRNA         No notable events documented      /57 (03/02/23 0945)    Temp 97 8 °F (36 6 °C) (03/02/23 0943)    Pulse 64 (03/02/23 0945)   Resp 18 (03/02/23 0945)    SpO2 95 % (03/02/23 0945)

## 2023-03-02 NOTE — H&P
H&P Exam - Orthopedics   Josie Montalvo [de-identified] y o  female MRN: [id number]  Unit/Bed#: OR POOL Encounter: [id number]    Assessment/Plan     Assessment:  Right shoulder rotator cuff tear arthropathy  Plan:  Right reverse total shoulder arthroplasty    History of Present Illness   HPI:  Josie Montalvo is a [de-identified] y o  female who presents with severe pain and weakness about the right shoulder secondary to rotator cuff tear arthropathy  She wishes to have a right reverse total shoulder arthroplasty  She has failed nonoperative measures and wished to undergo the procedure  She understood the risks and benefits of that procedure  The risks are inclusive of but not limited to infection, stiffness, nerve or blood vessel injury causing numbness pain and weakness, fracture, dislocation, failure to alleviate discomfort, failure to regain full strength and ability, failure to achieve anticipated results, and need for further surgery       Review of Systems    Historical Information   Past Medical History:   Diagnosis Date   • Cancer ([facility] )    • Disease of thyroid gland    • Hyperlipidemia    • Hypertension    • 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 ([facility] )      Past Surgical History:   Procedure Laterality Date   • BREAST LUMPECTOMY Right    • HYSTERECTOMY      partial - age 42     Social History   Social History     Substance and Sexual Activity   Alcohol Use No     Social History     Substance and Sexual Activity   Drug Use No     Social History     Tobacco Use   Smoking Status Never   Smokeless Tobacco Never     Family History:   Family History   Problem Relation Age of Onset   • Other Mother         DJD, cervical   • Diabetes Father         DM   • Autism Son    • Substance Abuse Neg Hx    • Mental illness Neg Hx        Meds/Allergies   PTA meds:   Prior to Admission Medications   Prescriptions Last Dose Informant Patient Reported? Taking?    CALCIUM PO 3/1/2023  Yes Yes   Sig: Take by mouth   amLODIPine (NORVASC) 5 mg tablet 3/2/2023 at 0430  Yes Yes   Sig: Take 5 mg by mouth in the morning  cholecalciferol (VITAMIN D3) 1,000 units tablet 3/1/2023 at 1200  Yes Yes   Sig: Take 1,000 Units by mouth daily   levothyroxine 88 mcg tablet 3/2/2023 at 0430  No Yes   Sig: Take 1 tablet (88 mcg total) by mouth daily   losartan (COZAAR) 100 MG tablet 3/1/2023 at 1000  No Yes   Sig: Take 1 tablet (100 mg total) by mouth daily      Facility-Administered Medications: None     Allergies   Allergen Reactions   • Zoledronic Acid        Objective   Vitals: Blood pressure 160/68, pulse 71, temperature (!) 97 4 °F (36 3 °C), temperature source Tympanic, resp  rate 16, weight 67 2 kg (148 lb 3 2 oz), SpO2 98 %  ,Body mass index is 29 93 kg/m²  No intake or output data in the 24 hours ending 03/02/23 0718    No intake/output data recorded  Invasive Devices     Peripheral Intravenous Line  Duration           Peripheral IV 03/02/23 Left Hand <1 day                Physical Exam  Vitals reviewed  Constitutional:       Appearance: She is well-developed  HENT:      Head: Normocephalic and atraumatic  Right Ear: External ear normal       Left Ear: External ear normal    Eyes:      Conjunctiva/sclera: Conjunctivae normal    Cardiovascular:      Rate and Rhythm: Normal rate  Pulmonary:      Effort: Pulmonary effort is normal  No respiratory distress  Skin:     General: Skin is warm  Capillary Refill: Capillary refill takes less than 2 seconds  Neurological:      Mental Status: She is alert and oriented to person, place, and time     Psychiatric:         Behavior: Behavior normal        Right Shoulder Exam     Other   Erythema: absent  Scars: absent            Lab Results: CBC: No results found for: WBC, HGB, HCT, MCV, PLT, ADJUSTEDWBC, MCH, MCHC, RDW, MPV, NRBC  Imaging: I have personally reviewed pertinent films in PACS

## 2023-03-02 NOTE — PERIOPERATIVE NURSING NOTE
Received from [facility]  Awake, alert  VSS  Right miguel dsg dry, intact  Ice to right shoulder, sling intact  Gien menu and ice water  Pt's sign  Other at bedside

## 2023-03-02 NOTE — DISCHARGE INSTR - AVS FIRST PAGE
Sling:   Wear your sling at all times after your surgery (this includes sleeping)  Additionally, you should not carry anything heavier than a pencil in your hand  Dressing:   Leave dressing in place  Showering: You may shower 3 days after surgery  Please use CAUTION!! Be careful not to slip and fall  The effects of anesthesia and/or medication may make you drowsy or light-headed  Do not soak in a bathtub, hot tub, or pool until the doctor tells you it is O K , to do so  Your dressing should be waterproof  If this gets soaked in the shower you may remove this  While in the shower you must keep the arm across the front of the body as if it were still in the sling  Sleeping:   You will most likely have difficulty sleeping in the first few weeks after surgery  Most people find it more comfortable to sleep in a reclining position  You can either sleep in a recliner chair or create this position with pillows  Ice:   You can ice the shoulder to reduce swelling and discomfort  Do not ice the shoulder more than 20 minutes at a time  Let the shoulder warm up before reapplication  Avoid getting you wound wet  If you have a Cryocuff you may keep this on continuously  Follow-up visit:   You need to see the doctor about one week following surgery for your first post-op visit  You will be given a prescription to begin physical therapy if you were not already given one  Common concerns:   Bruising and/or swelling of the shoulder, arm, or hand are common after surgery  To relieve this discomfort it is best to ice the shoulder  Please call if:   Any oozing or redness of the wound, fevers (>101 3°F), or chills  2  Any difficulty breathing or heaviness in the chest      REMEMBER - these are only guidelines for what to expect  If you have any questions or concerns, please do not hesitate to call the office  [phone]

## 2023-03-02 NOTE — ANESTHESIA PROCEDURE NOTES
Peripheral Block    Patient location during procedure: pre-op  Start time: 3/2/2023 7:15 AM  Reason for block: at surgeon's request and post-op pain management  Staffing  Performed: Anesthesiologist   Anesthesiologist: Geeta James MD  Preanesthetic Checklist  Completed: patient identified, IV checked, site marked, risks and benefits discussed, surgical consent, monitors and equipment checked, pre-op evaluation and timeout performed  Peripheral Block  Patient position: supine  Prep: ChloraPrep  Patient monitoring: heart rate, continuous pulse ox and frequent blood pressure checks  Block type: interscalene  Laterality: right  Injection technique: single-shot  Procedures: ultrasound guided, Ultrasound guidance required for the procedure to increase accuracy and safety of medication placement and decrease risk of complications    Ultrasound permanent image savedbupivacaine (MARCAINE) 0 5 % - Perineural   15 mL - 3/2/2023 7:16:00 AM (with 15 cc exparel)  Needle  Needle type: Stimuplex   Needle gauge: 22 G  Needle length: 10 cm  Needle localization: ultrasound guidance  Assessment  Injection assessment: incremental injection, local visualized surrounding nerve on ultrasound, negative aspiration for heme and no paresthesia on injection  Paresthesia pain: none  Heart rate change: no  Slow fractionated injection: yes  Post-procedure:  site cleaned  patient tolerated the procedure well with no immediate complications

## 2023-03-07 ENCOUNTER — APPOINTMENT (EMERGENCY)
Dept: RADIOLOGY | Facility: HOSPITAL | Age: 78
End: 2023-03-07
Attending: EMERGENCY MEDICINE

## 2023-03-07 ENCOUNTER — HOSPITAL ENCOUNTER (INPATIENT)
Facility: HOSPITAL | Age: 78
LOS: 1 days | Discharge: NURSING, REHAB OR LONG-TERM CARE | End: 2023-03-09
Attending: EMERGENCY MEDICINE | Admitting: INTERNAL MEDICINE

## 2023-03-07 ENCOUNTER — APPOINTMENT (EMERGENCY)
Dept: RADIOLOGY | Facility: HOSPITAL | Age: 78
End: 2023-03-07

## 2023-03-07 ENCOUNTER — TELEPHONE (OUTPATIENT)
Dept: OBGYN CLINIC | Facility: CLINIC | Age: 78
End: 2023-03-07

## 2023-03-07 DIAGNOSIS — M25.561 BILATERAL KNEE PAIN: ICD-10-CM

## 2023-03-07 DIAGNOSIS — U07.1 COVID-19 VIRUS INFECTION: ICD-10-CM

## 2023-03-07 DIAGNOSIS — D64.9 ANEMIA, UNSPECIFIED TYPE: ICD-10-CM

## 2023-03-07 DIAGNOSIS — M25.461 EFFUSION OF RIGHT KNEE: ICD-10-CM

## 2023-03-07 DIAGNOSIS — M25.562 BILATERAL KNEE PAIN: ICD-10-CM

## 2023-03-07 DIAGNOSIS — Z96.611 S/P REVERSE TOTAL SHOULDER ARTHROPLASTY, RIGHT: ICD-10-CM

## 2023-03-07 DIAGNOSIS — D64.9 SYMPTOMATIC ANEMIA: ICD-10-CM

## 2023-03-07 DIAGNOSIS — E55.9 VITAMIN D DEFICIENCY: ICD-10-CM

## 2023-03-07 DIAGNOSIS — R53.1 GENERALIZED WEAKNESS: Primary | ICD-10-CM

## 2023-03-07 DIAGNOSIS — M17.0 OSTEOARTHRITIS OF BOTH KNEES, UNSPECIFIED OSTEOARTHRITIS TYPE: ICD-10-CM

## 2023-03-07 PROBLEM — R26.9 GAIT DIFFICULTY: Status: ACTIVE | Noted: 2023-03-07

## 2023-03-07 PROBLEM — E87.1 HYPONATREMIA: Status: ACTIVE | Noted: 2023-03-07

## 2023-03-07 LAB
2HR DELTA HS TROPONIN: 1 NG/L
4HR DELTA HS TROPONIN: 1 NG/L
ALBUMIN SERPL BCP-MCNC: 3.6 G/DL (ref 3.5–5)
ALP SERPL-CCNC: 61 U/L (ref 34–104)
ALT SERPL W P-5'-P-CCNC: 11 U/L (ref 7–52)
ANION GAP SERPL CALCULATED.3IONS-SCNC: 8 MMOL/L (ref 4–13)
AST SERPL W P-5'-P-CCNC: 17 U/L (ref 13–39)
ATRIAL RATE: 78 BPM
BACTERIA UR QL AUTO: ABNORMAL /HPF
BASOPHILS # BLD AUTO: 0.02 THOUSANDS/ÂΜL (ref 0–0.1)
BASOPHILS NFR BLD AUTO: 0 % (ref 0–1)
BILIRUB DIRECT SERPL-MCNC: 0.25 MG/DL (ref 0–0.2)
BILIRUB SERPL-MCNC: 1.85 MG/DL (ref 0.2–1)
BILIRUB UR QL STRIP: ABNORMAL
BUN SERPL-MCNC: 14 MG/DL (ref 5–25)
CALCIUM SERPL-MCNC: 9.3 MG/DL (ref 8.4–10.2)
CARDIAC TROPONIN I PNL SERPL HS: 5 NG/L
CARDIAC TROPONIN I PNL SERPL HS: 6 NG/L
CARDIAC TROPONIN I PNL SERPL HS: 6 NG/L
CHLORIDE SERPL-SCNC: 98 MMOL/L (ref 96–108)
CLARITY UR: CLEAR
CO2 SERPL-SCNC: 27 MMOL/L (ref 21–32)
COLOR UR: ABNORMAL
CREAT SERPL-MCNC: 0.48 MG/DL (ref 0.6–1.3)
CRP SERPL QL: 177.5 MG/L
EOSINOPHIL # BLD AUTO: 0.03 THOUSAND/ÂΜL (ref 0–0.61)
EOSINOPHIL NFR BLD AUTO: 0 % (ref 0–6)
ERYTHROCYTE [DISTWIDTH] IN BLOOD BY AUTOMATED COUNT: 12.1 % (ref 11.6–15.1)
FLUAV RNA RESP QL NAA+PROBE: NEGATIVE
FLUBV RNA RESP QL NAA+PROBE: NEGATIVE
GFR SERPL CREATININE-BSD FRML MDRD: 92 ML/MIN/1.73SQ M
GLUCOSE SERPL-MCNC: 121 MG/DL (ref 65–140)
GLUCOSE UR STRIP-MCNC: NEGATIVE MG/DL
HCT VFR BLD AUTO: 29.4 % (ref 34.8–46.1)
HGB BLD-MCNC: 9.8 G/DL (ref 11.5–15.4)
HGB UR QL STRIP.AUTO: ABNORMAL
HYALINE CASTS #/AREA URNS LPF: ABNORMAL /LPF
IMM GRANULOCYTES # BLD AUTO: 0.04 THOUSAND/UL (ref 0–0.2)
IMM GRANULOCYTES NFR BLD AUTO: 1 % (ref 0–2)
KETONES UR STRIP-MCNC: ABNORMAL MG/DL
LEUKOCYTE ESTERASE UR QL STRIP: NEGATIVE
LYMPHOCYTES # BLD AUTO: 0.97 THOUSANDS/ÂΜL (ref 0.6–4.47)
LYMPHOCYTES NFR BLD AUTO: 13 % (ref 14–44)
MAGNESIUM SERPL-MCNC: 1.9 MG/DL (ref 1.9–2.7)
MCH RBC QN AUTO: 29.7 PG (ref 26.8–34.3)
MCHC RBC AUTO-ENTMCNC: 33.3 G/DL (ref 31.4–37.4)
MCV RBC AUTO: 89 FL (ref 82–98)
MONOCYTES # BLD AUTO: 0.85 THOUSAND/ÂΜL (ref 0.17–1.22)
MONOCYTES NFR BLD AUTO: 12 % (ref 4–12)
MUCOUS THREADS UR QL AUTO: ABNORMAL
NEUTROPHILS # BLD AUTO: 5.44 THOUSANDS/ÂΜL (ref 1.85–7.62)
NEUTS SEG NFR BLD AUTO: 74 % (ref 43–75)
NITRITE UR QL STRIP: NEGATIVE
NON-SQ EPI CELLS URNS QL MICRO: ABNORMAL /HPF
NRBC BLD AUTO-RTO: 0 /100 WBCS
P AXIS: 16 DEGREES
PH UR STRIP.AUTO: 6.5 [PH]
PLATELET # BLD AUTO: 252 THOUSANDS/UL (ref 149–390)
PMV BLD AUTO: 9.2 FL (ref 8.9–12.7)
POTASSIUM SERPL-SCNC: 4.5 MMOL/L (ref 3.5–5.3)
PR INTERVAL: 138 MS
PROT SERPL-MCNC: 6.9 G/DL (ref 6.4–8.4)
PROT UR STRIP-MCNC: NEGATIVE MG/DL
QRS AXIS: 14 DEGREES
QRSD INTERVAL: 90 MS
QT INTERVAL: 384 MS
QTC INTERVAL: 437 MS
RBC # BLD AUTO: 3.3 MILLION/UL (ref 3.81–5.12)
RBC #/AREA URNS AUTO: ABNORMAL /HPF
RSV RNA RESP QL NAA+PROBE: NEGATIVE
SARS-COV-2 RNA RESP QL NAA+PROBE: POSITIVE
SODIUM SERPL-SCNC: 133 MMOL/L (ref 135–147)
SP GR UR STRIP.AUTO: 1.01 (ref 1–1.03)
T WAVE AXIS: 14 DEGREES
UROBILINOGEN UR QL STRIP.AUTO: 1 E.U./DL
VENTRICULAR RATE: 78 BPM
WBC # BLD AUTO: 7.35 THOUSAND/UL (ref 4.31–10.16)
WBC #/AREA URNS AUTO: ABNORMAL /HPF

## 2023-03-07 RX ORDER — SODIUM CHLORIDE 9 MG/ML
50 INJECTION, SOLUTION INTRAVENOUS CONTINUOUS
Status: DISCONTINUED | OUTPATIENT
Start: 2023-03-07 | End: 2023-03-08

## 2023-03-07 RX ORDER — OXYCODONE HYDROCHLORIDE 5 MG/1
2.5 TABLET ORAL EVERY 4 HOURS PRN
Status: DISCONTINUED | OUTPATIENT
Start: 2023-03-07 | End: 2023-03-09 | Stop reason: HOSPADM

## 2023-03-07 RX ORDER — HYDROMORPHONE HCL/PF 1 MG/ML
0.5 SYRINGE (ML) INJECTION ONCE
Status: COMPLETED | OUTPATIENT
Start: 2023-03-07 | End: 2023-03-07

## 2023-03-07 RX ORDER — LEVOTHYROXINE SODIUM 88 UG/1
88 TABLET ORAL
Status: DISCONTINUED | OUTPATIENT
Start: 2023-03-08 | End: 2023-03-09 | Stop reason: HOSPADM

## 2023-03-07 RX ORDER — AMLODIPINE BESYLATE 5 MG/1
5 TABLET ORAL DAILY
Status: DISCONTINUED | OUTPATIENT
Start: 2023-03-08 | End: 2023-03-09 | Stop reason: HOSPADM

## 2023-03-07 RX ORDER — ACETAMINOPHEN 325 MG/1
650 TABLET ORAL EVERY 6 HOURS PRN
Status: DISCONTINUED | OUTPATIENT
Start: 2023-03-07 | End: 2023-03-09 | Stop reason: HOSPADM

## 2023-03-07 RX ORDER — DOCUSATE SODIUM 100 MG/1
100 CAPSULE, LIQUID FILLED ORAL 2 TIMES DAILY
Status: DISCONTINUED | OUTPATIENT
Start: 2023-03-07 | End: 2023-03-08

## 2023-03-07 RX ORDER — POLYETHYLENE GLYCOL 3350 17 G/17G
17 POWDER, FOR SOLUTION ORAL DAILY PRN
Status: DISCONTINUED | OUTPATIENT
Start: 2023-03-07 | End: 2023-03-09 | Stop reason: HOSPADM

## 2023-03-07 RX ORDER — SENNOSIDES 8.6 MG
2 TABLET ORAL
Status: DISCONTINUED | OUTPATIENT
Start: 2023-03-07 | End: 2023-03-09 | Stop reason: HOSPADM

## 2023-03-07 RX ORDER — CALCIUM CARBONATE 500(1250)
1 TABLET ORAL 2 TIMES DAILY WITH MEALS
Status: DISCONTINUED | OUTPATIENT
Start: 2023-03-08 | End: 2023-03-09 | Stop reason: HOSPADM

## 2023-03-07 RX ORDER — MELATONIN
1000 DAILY
Status: DISCONTINUED | OUTPATIENT
Start: 2023-03-08 | End: 2023-03-09 | Stop reason: HOSPADM

## 2023-03-07 RX ORDER — LOSARTAN POTASSIUM 50 MG/1
100 TABLET ORAL DAILY
Status: DISCONTINUED | OUTPATIENT
Start: 2023-03-08 | End: 2023-03-09 | Stop reason: HOSPADM

## 2023-03-07 RX ORDER — METHYLPREDNISOLONE SODIUM SUCCINATE 40 MG/ML
40 INJECTION, POWDER, LYOPHILIZED, FOR SOLUTION INTRAMUSCULAR; INTRAVENOUS ONCE
Status: COMPLETED | OUTPATIENT
Start: 2023-03-07 | End: 2023-03-07

## 2023-03-07 RX ORDER — ENOXAPARIN SODIUM 100 MG/ML
40 INJECTION SUBCUTANEOUS DAILY
Status: DISCONTINUED | OUTPATIENT
Start: 2023-03-08 | End: 2023-03-07

## 2023-03-07 RX ADMIN — SODIUM CHLORIDE 50 ML/HR: 0.9 INJECTION, SOLUTION INTRAVENOUS at 22:28

## 2023-03-07 RX ADMIN — METHYLPREDNISOLONE SODIUM SUCCINATE 40 MG: 40 INJECTION, POWDER, FOR SOLUTION INTRAMUSCULAR; INTRAVENOUS at 22:43

## 2023-03-07 RX ADMIN — HYDROMORPHONE HYDROCHLORIDE 0.5 MG: 1 INJECTION, SOLUTION INTRAMUSCULAR; INTRAVENOUS; SUBCUTANEOUS at 13:55

## 2023-03-07 RX ADMIN — SENNOSIDES 17.2 MG: 8.6 TABLET, FILM COATED ORAL at 22:43

## 2023-03-07 RX ADMIN — DOCUSATE SODIUM 100 MG: 100 CAPSULE, LIQUID FILLED ORAL at 22:43

## 2023-03-07 NOTE — ED NOTES
Vascular made aware of order for this pt and that she is in the hallway and needs to be taken to vascular for the study       Cuauhtemoc Garcia RN  03/07/23 [id number]

## 2023-03-07 NOTE — TELEPHONE ENCOUNTER
Pt's  had called in stating that pt has not been able to move her legs for 5 hours due to sever pain , and he is not sure if this has to do with the surgery she had with Dr Garrick Coronado 3/2/23    States legs looked red and bruised and ankles are swollen  Went over with providers in office on If pt should go to ED ask  was worrying if he needed to take her      Pt was advised to go to ED to get checked out

## 2023-03-07 NOTE — ASSESSMENT & PLAN NOTE
Status post reverse total shoulder arthroplasty, right 3/2/2023,  · Continue current postoperative care  · Right upper extremity sling  · Monitor exam  · Ortho evaluation appreciated   · No PT for 6 weeks   · PT/OT rec STR

## 2023-03-07 NOTE — ED PROVIDER NOTES
History  Chief Complaint   Patient presents with   • Leg Swelling     Pt states she developed lower extremity edema and pain today  Pt had right should surgery on Thursday and has been fine until last night when the pain began  This morning was worse  Pt could not stand on her own without extreme pain  [de-identified] yo F with past history of hyperlipidemia, hypertension, hypothyroidism, severe osteoarthritis to bilateral knees, presents to the ED for evaluation of generalized weakness, bilateral knee pain as well as difficulty ambulating over the past few days  Patient is status post right shoulder total replacement on 3/2/2023  Patient has not had any bowel movement since that time  Patient has had decreased solid and liquid intake since that time  Patient feels very weak as a result   is having difficult time taking care of her secondary to her weakness and worsening pain  Patient noted some swelling to the right knee without any redness or warmth to touch  Patient denies any falls or trauma  History provided by:  Patient      Prior to Admission Medications   Prescriptions Last Dose Informant Patient Reported? Taking? CALCIUM PO   Yes No   Sig: Take by mouth   amLODIPine (NORVASC) 5 mg tablet   Yes No   Sig: Take 5 mg by mouth in the morning     aspirin (ECOTRIN) 325 mg EC tablet   No No   Sig: Take 1 tablet (325 mg total) by mouth daily   cholecalciferol (VITAMIN D3) 1,000 units tablet   Yes No   Sig: Take 1,000 Units by mouth daily   levothyroxine 88 mcg tablet   No No   Sig: Take 1 tablet (88 mcg total) by mouth daily   losartan (COZAAR) 100 MG tablet   No No   Sig: Take 1 tablet (100 mg total) by mouth daily   oxyCODONE (Roxicodone) 5 immediate release tablet   No No   Sig: Take 1 tablet (5 mg total) by mouth every 4 (four) hours as needed for moderate pain for up to 20 doses Max Daily Amount: 30 mg      Facility-Administered Medications: None       Past Medical History:   Diagnosis Date   • Cancer [facility])    • Disease of thyroid gland    • Hyperlipidemia    • Hypertension    • 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 [facility])        Past Surgical History:   Procedure Laterality Date   • BREAST LUMPECTOMY Right    • HYSTERECTOMY      partial - age 42   • IN ARTHROPLASTY GLENOHUMERAL JOINT TOTAL SHOULDER Right 3/2/2023    Procedure: Reverse Total Shoulder Arthroplasty - SAME DAY DISCHARGE;  Surgeon: Nelida Scheuermann, MD;  Location: [facility];  Service: Orthopedics       Family History   Problem Relation Age of Onset   • Other Mother         DJD, cervical   • Diabetes Father         DM   • Autism Son    • Substance Abuse Neg Hx    • Mental illness Neg Hx      I have reviewed and agree with the history as documented  E-Cigarette/Vaping   • E-Cigarette Use Never User      E-Cigarette/Vaping Substances   • Nicotine No    • THC No    • CBD No    • Flavoring No    • Other No    • Unknown No      Social History     Tobacco Use   • Smoking status: Never   • Smokeless tobacco: Never   Vaping Use   • Vaping Use: Never used   Substance Use Topics   • Alcohol use: No   • Drug use: No       Review of Systems   Constitutional: Negative for chills and fever  HENT: Negative for ear pain and sore throat  Eyes: Negative for pain and visual disturbance  Respiratory: Negative for cough and shortness of breath  Cardiovascular: Negative for chest pain and palpitations  Gastrointestinal: Negative for abdominal pain and vomiting  Genitourinary: Negative for dysuria and hematuria  Musculoskeletal: Positive for arthralgias  Negative for back pain  Skin: Negative for color change and rash  Neurological: Positive for weakness  Negative for seizures and syncope  All other systems reviewed and are negative  Physical Exam  Physical Exam  Vitals and nursing note reviewed  Constitutional:       General: She is not in acute distress  Appearance: She is well-developed     HENT: Head: Normocephalic and atraumatic  Eyes:      Conjunctiva/sclera: Conjunctivae normal    Cardiovascular:      Rate and Rhythm: Normal rate and regular rhythm  Heart sounds: No murmur heard  Pulmonary:      Effort: Pulmonary effort is normal  No respiratory distress  Breath sounds: Normal breath sounds  Abdominal:      Palpations: Abdomen is soft  Tenderness: There is no abdominal tenderness  Musculoskeletal:         General: No swelling  Cervical back: Neck supple  Comments: Tenderness to palpation and some mild swelling noted to right knee  Right knee is not warm to touch or red  Range of motion of right knee is limited secondary to pain  Mild tenderness to palpation noted to the left knee  No erythema, edema, or warmth to touch noted on examination of left knee  Left knee is slightly more mobile compared to right knee however range of motion is still decreased secondary to pain  Skin:     General: Skin is warm and dry  Capillary Refill: Capillary refill takes less than 2 seconds  Neurological:      Mental Status: She is alert     Psychiatric:         Mood and Affect: Mood normal          Vital Signs  ED Triage Vitals   Temperature Pulse Respirations Blood Pressure SpO2   03/07/23 1334 03/07/23 1334 03/07/23 1334 03/07/23 1334 03/07/23 1334   97 5 °F (36 4 °C) 79 18 135/63 98 %      Temp Source Heart Rate Source Patient Position - Orthostatic VS BP Location FiO2 (%)   03/07/23 1334 03/07/23 1334 03/07/23 1334 03/07/23 1334 --   Tympanic Monitor Lying Left arm       Pain Score       03/07/23 1355       10 - Worst Possible Pain           Vitals:    03/07/23 1334 03/07/23 2038   BP: 135/63 (!) 171/70   Pulse: 79 89   Patient Position - Orthostatic VS: Lying Sitting         Visual Acuity      ED Medications  Medications   HYDROmorphone (DILAUDID) injection 0 5 mg (0 5 mg Intravenous Given 3/7/23 1355)       Diagnostic Studies  Results Reviewed     Procedure Component Value Units Date/Time    HS Troponin I 4hr [[id number]]  (Normal) Collected: 03/07/23 1958    Lab Status: Final result Specimen: Blood from Arm, Left Updated: 03/07/23 2038     hs TnI 4hr 6 ng/L      Delta 4hr hsTnI 1 ng/L     HS Troponin I 2hr [[id number]]  (Normal) Collected: 03/07/23 1829    Lab Status: Final result Specimen: Blood from Arm, Left Updated: 03/07/23 1859     hs TnI 2hr 6 ng/L      Delta 2hr hsTnI 1 ng/L     Urine Microscopic [[id number]]  (Abnormal) Collected: 03/07/23 1640    Lab Status: Final result Specimen: Urine, Clean Catch Updated: 03/07/23 1716     RBC, UA 0-1 /hpf      WBC, UA 2-4 /hpf      Epithelial Cells Moderate /hpf      Bacteria, UA Occasional /hpf      Hyaline Casts, UA 1-2 /lpf      MUCUS THREADS Moderate    UA w Reflex to Microscopic w Reflex to Culture [[id number]]  (Abnormal) Collected: 03/07/23 1640    Lab Status: Final result Specimen: Urine, Clean Catch Updated: 03/07/23 1705     Color, UA Orange     Clarity, UA Clear     Specific Gravity, UA 1 015     pH, UA 6 5     Leukocytes, UA Negative     Nitrite, UA Negative     Protein, UA Negative mg/dl      Glucose, UA Negative mg/dl      Ketones, UA 15 (1+) mg/dl      Urobilinogen, UA 1 0 E U /dl      Bilirubin, UA Small     Occult Blood, UA Trace-Intact    FLU/RSV/COVID - if FLU/RSV clinically relevant [[id number]]  (Abnormal) Collected: 03/07/23 1515    Lab Status: Final result Specimen: Nares from Nose Updated: 03/07/23 1609     SARS-CoV-2 Positive     INFLUENZA A PCR Negative     INFLUENZA B PCR Negative     RSV PCR Negative    Narrative:      FOR PEDIATRIC PATIENTS - copy/paste COVID Guidelines URL to browser: [email or web address]    SARS-CoV-2 assay is a Nucleic Acid Amplification assay intended for the  qualitative detection of nucleic acid from SARS-CoV-2 in nasopharyngeal  swabs  Results are for the presumptive identification of SARS-CoV-2 RNA      Positive results are indicative of infection with SARS-CoV-2, the virus  causing COVID-19, but do not rule out bacterial infection or co-infection  with other viruses  Laboratories within the [place] and its  territories are required to report all positive results to the appropriate  public health authorities  Negative results do not preclude SARS-CoV-2  infection and should not be used as the sole basis for treatment or other  patient management decisions  Negative results must be combined with  clinical observations, patient history, and epidemiological information  This test has not been FDA cleared or approved  This test has been authorized by FDA under an Emergency Use Authorization  (EUA)  This test is only authorized for the duration of time the  declaration that circumstances exist justifying the authorization of the  emergency use of an in vitro diagnostic tests for detection of SARS-CoV-2  virus and/or diagnosis of COVID-19 infection under section 564(b)(1) of  the Act, 21 U  S C  313ELH-2(S)(5), unless the authorization is terminated  or revoked sooner  The test has been validated but independent review by FDA  and CLIA is pending  Test performed using [employer] GeneXpert: This RT-PCR assay targets N2,  a region unique to SARS-CoV-2  A conserved region in the E-gene was chosen  for pan-Sarbecovirus detection which includes SARS-CoV-2  According to CMS-2020-01-R, this platform meets the definition of high-throughput technology      HS Troponin 0hr (reflex protocol) [[id number]]  (Normal) Collected: 03/07/23 1515    Lab Status: Final result Specimen: Blood from Arm, Left Updated: 03/07/23 1552     hs TnI 0hr 5 ng/L     Comprehensive metabolic panel [[id number]]  (Abnormal) Collected: 03/07/23 1353    Lab Status: Final result Specimen: Blood from Arm, Left Updated: 03/07/23 1443     Sodium 133 mmol/L      Potassium 4 5 mmol/L      Chloride 98 mmol/L      CO2 27 mmol/L      ANION GAP 8 mmol/L      BUN 14 mg/dL Creatinine 0 48 mg/dL      Glucose 121 mg/dL      Calcium 9 3 mg/dL      AST 17 U/L      ALT 11 U/L      Alkaline Phosphatase 61 U/L      Total Protein 6 9 g/dL      Albumin 3 6 g/dL      Total Bilirubin 1 85 mg/dL      eGFR 92 ml/min/1 73sq m     Narrative:      [place] guidelines for Chronic Kidney Disease (CKD):   •  Stage 1 with normal or high GFR (GFR > 90 mL/min/1 73 square meters)  •  Stage 2 Mild CKD (GFR = 60-89 mL/min/1 73 square meters)  •  Stage 3A Moderate CKD (GFR = 45-59 mL/min/1 73 square meters)  •  Stage 3B Moderate CKD (GFR = 30-44 mL/min/1 73 square meters)  •  Stage 4 Severe CKD (GFR = 15-29 mL/min/1 73 square meters)  •  Stage 5 End Stage CKD (GFR <15 mL/min/1 73 square meters)  Note: GFR calculation is accurate only with a steady state creatinine    Magnesium [[id number]]  (Normal) Collected: 03/07/23 1353    Lab Status: Final result Specimen: Blood from Arm, Left Updated: 03/07/23 1443     Magnesium 1 9 mg/dL     CBC and differential [[id number]]  (Abnormal) Collected: 03/07/23 1353    Lab Status: Final result Specimen: Blood from Arm, Left Updated: 03/07/23 1358     WBC 7 35 Thousand/uL      RBC 3 30 Million/uL      Hemoglobin 9 8 g/dL      Hematocrit 29 4 %      MCV 89 fL      MCH 29 7 pg      MCHC 33 3 g/dL      RDW 12 1 %      MPV 9 2 fL      Platelets 424 Thousands/uL      nRBC 0 /100 WBCs      Neutrophils Relative 74 %      Immat GRANS % 1 %      Lymphocytes Relative 13 %      Monocytes Relative 12 %      Eosinophils Relative 0 %      Basophils Relative 0 %      Neutrophils Absolute 5 44 Thousands/µL      Immature Grans Absolute 0 04 Thousand/uL      Lymphocytes Absolute 0 97 Thousands/µL      Monocytes Absolute 0 85 Thousand/µL      Eosinophils Absolute 0 03 Thousand/µL      Basophils Absolute 0 02 Thousands/µL                  XR knee 4+ views Right injury   Final Result by Nestor Hathaway MD (03/07 1652)      Moderate effusion with tricompartmental osteoarthritis and chondrocalcinosis  Workstation performed: [id number]         XR knee 4+ views left injury   Final Result by Gordon Gonzalez MD (03/07 1651)      Moderate to severe tricompartmental osteoarthritis, most severe in the medial compartment  Chondrocalcinosis  Workstation performed: [id number]         XR chest 1 view   ED Interpretation by Braulio Warner DO (03/07 1649)   No acute abnormalities noted      Final Result by Gordon Gonzalez MD (03/07 1653)      No acute cardiopulmonary disease  This report is in agreement with the preliminary interpretation  Workstation performed: [id number]         VAS lower limb venous duplex study, unilateral/limited    (Results Pending)              Procedures  ECG 12 Lead Documentation Only    Date/Time: 3/7/2023 3:13 PM  Performed by: Braulio Warner DO  Authorized by: Braulio Warner DO     Indications / Diagnosis:  Weakness  ECG reviewed by me, the ED Provider: yes    Patient location:  ED  Previous ECG:     Previous ECG:  Compared to current    Similarity:  No change    Comparison to cardiac monitor: Yes    Interpretation:     Interpretation: abnormal    Comments:      Sinus rhythm, rate 78, normal axis, normal intervals, no acute ST/T wave abnormalities noted, Q waves noted in lead III suggesting possible inferior infarct of undetermined age that is new compared to previous study  ED Course  ED Course as of 03/07/23 2112   Tue Mar 07, 2023   1523 Case discussed with vascular tech who notes patient is negative for DVT of right lower extremity however there is effusion noted to the right knee  SBIRT 20yo+    Flowsheet Row Most Recent Value   SBIRT (23 yo +)    In order to provide better care to our patients, we are screening all of our patients for alcohol and drug use  Would it be okay to ask you these screening questions?  No Filed at: 03/07/2023 6603 Medical Decision Making  Obtain blood work, x-ray of bilateral knees, venous duplex of right lower extremity, viral swabs,  Give IV fluids, pain medication and continue to monitor patient for any worsening symptoms  Patient's hemoglobin was 9 7 which is significantly low compared to hemoglobin a month ago  Patient denies any bleeding per rectum  Patient is currently constipated  Patient was negative for DVT of right lower extremity  Effusion noted to right knee  Patient was also COVID-positive  At this time patient will be admitted for further management  Patient's pain was controlled in the ED with IV Dilaudid  Patient and family agrees with admission plans  Amount and/or Complexity of Data Reviewed  Labs: ordered  Decision-making details documented in ED Course  Radiology: ordered and independent interpretation performed  Decision-making details documented in ED Course  ECG/medicine tests: ordered and independent interpretation performed  Decision-making details documented in ED Course  Risk  Prescription drug management  Decision regarding hospitalization            Disposition  Final diagnoses:   Generalized weakness   Symptomatic anemia   Bilateral knee pain   Effusion of right knee   COVID-19 virus infection     Time reflects when diagnosis was documented in both MDM as applicable and the Disposition within this note     Time User Action Codes Description Comment    3/7/2023  3:23 PM Eppie Chiquito Add [R53 1] Generalized weakness     3/7/2023  3:24 PM Eppie Chiquito Add [D64 9] Symptomatic anemia     3/7/2023  3:24 PM Eppie Chiquito Add [M25 561,  M25 562] Bilateral knee pain     3/7/2023  3:24 PM Eppie Chiquito Add [M25 461] Effusion of right knee     3/7/2023  4:28 PM Margarita Thomas Add [U07 1] COVID-19 virus infection       ED Disposition     ED Disposition   Admit    Condition   Stable    Date/Time   Tue Mar 7, 2023  3:25 PM    Comment   Case was discussed with Belinda and the patient's admission status was agreed to be Admission Status: observation status to the service of Dr Srinivas Eubanks    None         Patient's Medications   Discharge Prescriptions    No medications on file       No discharge procedures on file      PDMP Review     None          ED Provider  Electronically Signed by           Lee Ann Millan,   03/07/23 Addis 48, DO  03/07/23 8300

## 2023-03-07 NOTE — H&P
History and Physical - [facility] Internal Medicine    Patient Information: John Garcia [de-identified] y o  female MRN: [id number]  Unit/Bed#: ED 05 Encounter: [id number]  Admitting Physician: Zachery Howard MD  PCP: Don Zapata  Date of Admission:  03/07/23        Hospital Problem List:     Principal Problem:    Gait difficulty  Active Problems:    Osteoarthritis of knees, bilateral    COVID-19    Hypertension    Hypothyroidism    Anemia    Generalized weakness    S/P reverse total shoulder arthroplasty, right    Hyponatremia      Assessment/Plan:    COVID-19  Assessment & Plan  Patient was noted to be SARS-CoV-2 PCR positive in ED  History of COVID infection in 2020, received COVID-vaccine x2 but reported having diffuse neck pain postvaccine  Patient denies any URI or respiratory symptoms  Possibly contributing generalized weakness and poor appetite  Afebrile, saturating adequately on room air  · We will monitor symptoms  · Incentive spirometry  · Follow-up labs  · Check CRP  · PT/OT      Osteoarthritis of knees, bilateral  Assessment & Plan  Presented with worsening of bilateral knee/leg pain and swelling starting yesterday, significantly worse today  Bilateral knee x-ray with moderate to severe tricompartmental osteoarthritis and chondrocalcinosis  No evidence of erythema or acute inflammation    Likely flareup of osteoarthritis, unclear etiology  · Pain control  · We will give IV steroid x1  · Orthopedic evaluation  · PT/OT    * Gait difficulty  Assessment & Plan  Multifactorial secondary to recent right reverse shoulder arthroplasty, bilateral osteoarthritis, generalized weakness  PT/OT      S/P reverse total shoulder arthroplasty, right  Assessment & Plan  Status post reverse total shoulder arthroplasty, right 3/2/2023,  · Continue current postoperative care  · Right upper extremity sling  · Follow-up labs  · Monitor exam  · Ortho evaluation  · PT/OT    Generalized weakness  Assessment & Plan  Iron studies, B12, folate, vitamin D, TSH  PT/OT    Anemia  Assessment & Plan  Hemoglobin noted to be 9 8 g/dL compared to 13 5 about a month ago  Patient denies any overt bleeding, hematemesis, hematochezia, dark stool but reports limited p o  intake  Denies NSAID use  Intraoperative blood loss was estimated 100 cc  Normocytic  · Iron studies, B12, folate  · Stool occult  · Monitor H&H    Hypothyroidism  Assessment & Plan  Continue Synthroid  Check TSH    Hypertension  Assessment & Plan  Stable  Resume home meds, monitor    Hyponatremia  Assessment & Plan  Suspect secondary to decreased p o  intake  IV NS  Monitor          VTE Prophylaxis: Pharmacologic VTE Prophylaxis contraindicated due to Anemia, unclear etiology  / sequential compression device   Code Status: Level 1 - Full Code    Anticipated Length of Stay:  Patient will be admitted on an Observation basis with an anticipated length of stay of  < 2 midnights  Justification for Hospital Stay: Bilateral knee pain, COVID    Total Time for Visit, including Counseling / Coordination of Care: 45 minutes  Greater than 50% of this total time spent on direct patient counseling and coordination of care  Chief Complaint:     Leg Swelling (Pt states she developed lower extremity edema and pain today  Pt had right should surgery on Thursday and has been fine until last night when the pain began  This morning was worse  Pt could not stand on her own without extreme pain )    History of Present Illness:    Marylee Anderson is a [de-identified] y o  female with past medical history of hypertension,  hypothyroidism, severe osteoarthritis involving bilateral knees who presents with worsening of lower extremity knee/leg pain and swelling  Patient had recent right total shoulder arthroplasty on 3/2/2023    Patient reported that she was doing well until yesterday when she started noticing increasing knee discomfort and today morning her symptoms significantly worsened with increasing pain and she was not able to bend her knee or bear weight due to significant pain  Patient denied any fever, chills, fall, injury  Patient denies any chest pain shortness of breath dizziness or palpitation  In ED, patient was afebrile vital signs were stable saturating 98% on room air  Labs revealed mild hyponatremia, elevated bilirubin  Hemoglobin was 9 8 compared to 13 5 in January  Cardiac markers were negative  Patient was noted to have SARS-CoV-2 PCR positive  Influenza RSV PCR was negative  Chest x-ray did not reveal any acute abnormality  Bilateral knee x-ray revealed moderate to severe tricompartmental osteoarthritis and chondrocalcinosis  Venous Doppler lower extremity revealed no evidence of right lower extremity DVT  Limited study on the left side was also negative for DVT  Patient received IV Dilaudid and was subsequently admitted  Review of Systems:    Review of Systems   Constitutional: Positive for activity change and appetite change  Negative for chills and fever  HENT: Negative for rhinorrhea, sore throat and trouble swallowing  Respiratory: Negative for cough, chest tightness and shortness of breath  Cardiovascular: Positive for leg swelling (Predominantly knee)  Negative for chest pain  Gastrointestinal: Positive for constipation  Negative for abdominal distention, abdominal pain, anal bleeding, blood in stool, nausea and vomiting  Reports limited p o  intake and abdominal fullness   Genitourinary: Negative for difficulty urinating and dysuria  Musculoskeletal: Positive for arthralgias and gait problem  Neurological: Negative for dizziness, weakness, light-headedness and headaches  Psychiatric/Behavioral: Negative for behavioral problems and confusion         Past Medical and Surgical History:     Past Medical History:   Diagnosis Date   • Cancer [facility])    • Disease of thyroid gland    • Hyperlipidemia    • Hypertension    • 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 Taking? CALCIUM PO   Yes No   Sig: Take by mouth   amLODIPine (NORVASC) 5 mg tablet   Yes No   Sig: Take 5 mg by mouth in the morning  aspirin (ECOTRIN) 325 mg EC tablet   No No   Sig: Take 1 tablet (325 mg total) by mouth daily   cholecalciferol (VITAMIN D3) 1,000 units tablet   Yes No   Sig: Take 1,000 Units by mouth daily   levothyroxine 88 mcg tablet   No No   Sig: Take 1 tablet (88 mcg total) by mouth daily   losartan (COZAAR) 100 MG tablet   No No   Sig: Take 1 tablet (100 mg total) by mouth daily   oxyCODONE (Roxicodone) 5 immediate release tablet   No No   Sig: Take 1 tablet (5 mg total) by mouth every 4 (four) hours as needed for moderate pain for up to 20 doses Max Daily Amount: 30 mg      Facility-Administered Medications: None       Allergies:    Allergies   Allergen Reactions   • Zoledronic Acid      History:     Marital Status: /Civil Union     Substance Use History:   Social History     Substance and Sexual Activity   Alcohol Use No     Social History     Tobacco Use   Smoking Status Never   Smokeless Tobacco Never     Social History     Substance and Sexual Activity   Drug Use No       Family History:    Family History   Problem Relation Age of Onset   • Other Mother         DJD, cervical   • Diabetes Father         DM   • Autism Son    • Substance Abuse Neg Hx    • Mental illness Neg Hx        Physical Exam:     Vitals:   Blood Pressure: 135/63 (03/07/23 1334)  Pulse: 79 (03/07/23 1334)  Temperature: 97 5 °F (36 4 °C) (03/07/23 1334)  Temp Source: Tympanic (03/07/23 1334)  Respirations: 18 (03/07/23 1334)  Height: 4' 11" (149 9 cm) (03/07/23 1334)  Weight - Scale: 71 4 kg (157 lb 6 5 oz) (03/07/23 1334)  SpO2: 98 % (03/07/23 1334)    Physical Exam  Constitutional:       General: She is not in acute distress  Appearance: She is obese  HENT:      Head: Normocephalic and atraumatic  Nose: No congestion or rhinorrhea  Mouth/Throat:      Mouth: Mucous membranes are dry  Pharynx: No posterior oropharyngeal erythema  Eyes:      Pupils: Pupils are equal, round, and reactive to light  Cardiovascular:      Rate and Rhythm: Normal rate  Pulmonary:      Effort: Pulmonary effort is normal  No respiratory distress  Breath sounds: Normal breath sounds  No wheezing or rales  Abdominal:      General: Bowel sounds are normal  There is no distension  Palpations: Abdomen is soft  Tenderness: There is no abdominal tenderness  There is no guarding or rebound  Musculoskeletal:         General: Swelling, tenderness and deformity present  Comments: Right knee-mild swelling with tenderness  No evidence of acute inflammation or erythema  Range of motion is limited secondary to pain   Left knee- mild tenderness  No evidence of acute inflammation or erythema  Range of motion is limited but better compared to right side  Skin:     General: Skin is warm and dry  Findings: No rash  Neurological:      Mental Status: She is alert  Lab Results: I have personally reviewed pertinent reports        Results from last 7 days   Lab Units 03/07/23  1353   WBC Thousand/uL 7 35   HEMOGLOBIN g/dL 9 8*   HEMATOCRIT % 29 4*   PLATELETS Thousands/uL 252   NEUTROS PCT % 74   LYMPHS PCT % 13*   MONOS PCT % 12   EOS PCT % 0     Results from last 7 days   Lab Units 03/07/23  1353   POTASSIUM mmol/L 4 5   CHLORIDE mmol/L 98   CO2 mmol/L 27   BUN mg/dL 14   CREATININE mg/dL 0 48*   CALCIUM mg/dL 9 3   ALK PHOS U/L 61   ALT U/L 11   AST U/L 17           Imaging: I have personally reviewed pertinent reports  XR shoulder 1 vw right    Result Date: 3/5/2023  Narrative: RIGHT SHOULDER INDICATION:   Post operative xray  COMPARISON:  MRI 6/18/2022 CT 1/27/2023 VIEWS:  XR SHOULDER 1 VW RIGHT FINDINGS: There is no acute fracture or dislocation  Unremarkable appearance of reverse shoulder arthroplasty  No lytic or blastic osseous lesion  Soft tissues are unremarkable  Impression: Unremarkable appearance of reverse total shoulder arthroplasty  Workstation performed: [id number]     XR knee 4+ views left injury    Result Date: 3/7/2023  Narrative: LEFT KNEE INDICATION:   pain  COMPARISON:  None VIEWS:  XR KNEE 4+ VW LEFT INJURY FINDINGS: There is no acute fracture or dislocation  There is no joint effusion  There is tricompartmental osteoarthritis with moderate to severe medial compartment narrowing and a large femoral osteophyte  No lytic or blastic osseous lesion  Meniscal chondrocalcinosis is identified  Impression: Moderate to severe tricompartmental osteoarthritis, most severe in the medial compartment  Chondrocalcinosis  Workstation performed: [id number]     XR knee 4+ views Right injury    Result Date: 3/7/2023  Narrative: RIGHT KNEE INDICATION:   pain  COMPARISON:  None VIEWS:  XR KNEE 4+ VW RIGHT INJURY FINDINGS: There is no acute fracture or dislocation  There is a moderate joint effusion  Mild tricompartmental osteoarthritis with lateral narrowing and tricompartment osteophyte formation  No lytic or blastic osseous lesion  Meniscal chondrocalcinosis is identified  Impression: Moderate effusion with tricompartmental osteoarthritis and chondrocalcinosis  Workstation performed: [id number]     XR chest 1 view    Result Date: 3/7/2023  Narrative: CHEST INDICATION:   weakness   COMPARISON:  None EXAM PERFORMED/VIEWS:  XR CHEST 1 VIEW FINDINGS: Cardiomediastinal silhouette appears unremarkable  There is platelike atelectasis or scarring in the right midlung field  No pneumothorax or pleural effusion  Osseous structures appear within normal limits for patient age  Impression: No acute cardiopulmonary disease  This report is in agreement with the preliminary interpretation  Workstation performed: [id number]     VAS lower limb venous duplex study, unilateral/limited    Result Date: 3/7/2023  Narrative:  [facility] REPORT CLINICAL: Indications: Patient presents with right lower extremity pain x one day  Risk Factors The patient has history of HTN and Hyperlipidemia  CONCLUSION: RIGHT LOWER LIMB No evidence of acute or chronic deep vein thrombosis   No evidence of superficial thrombophlebitis noted  No evidence of valvular incompetence noted in the deep veins  Popliteal, posterior tibial and anterior tibial arterial Doppler waveforms are triphasic  There is a well defined hypoechoic non-vascularized cystic-type structure noted in the popliteal fossa  LEFT LOWER LIMB LIMITED Evaluation shows no evidence of thrombus in the common femoral vein  Doppler evaluation shows a normal response to augmentation maneuvers  Technical findings were given to Dr Wendy Cooper 15:55    US bedside procedure    Result Date: 3/2/2023  Narrative: [id number]  2 [id number]  1 [id number]      XR knee 4+ views Right injury   Final Result      Moderate effusion with tricompartmental osteoarthritis and chondrocalcinosis  Workstation performed: [id number]         XR knee 4+ views left injury   Final Result      Moderate to severe tricompartmental osteoarthritis, most severe in the medial compartment  Chondrocalcinosis  Workstation performed: [id number]         XR chest 1 view   ED Interpretation   No acute abnormalities noted      Final Result      No acute cardiopulmonary disease        This report is in agreement with the preliminary interpretation  Workstation performed: [id number]         VAS lower limb venous duplex study, unilateral/limited    (Results Pending)       EKG, Pathology, and Other Studies Reviewed on Admission:   · EKG-normal sinus rhythm at 78 bpm   Without any acute ST-T wave changes  QTc 437 ms    Allscripts/EPIC Records Reviewed: Yes     ** Please Note: "This note has been constructed using a voice recognition system  Therefore there may be syntax, spelling, and/or grammatical errors   Please call if you have any questions  "**

## 2023-03-08 LAB
ALBUMIN SERPL BCP-MCNC: 3.4 G/DL (ref 3.5–5)
ALP SERPL-CCNC: 74 U/L (ref 34–104)
ALT SERPL W P-5'-P-CCNC: 13 U/L (ref 7–52)
ANION GAP SERPL CALCULATED.3IONS-SCNC: 15 MMOL/L (ref 4–13)
AST SERPL W P-5'-P-CCNC: 18 U/L (ref 13–39)
BILIRUB DIRECT SERPL-MCNC: 0.21 MG/DL (ref 0–0.2)
BILIRUB SERPL-MCNC: 1.3 MG/DL (ref 0.2–1)
BUN SERPL-MCNC: 17 MG/DL (ref 5–25)
CALCIUM SERPL-MCNC: 9.2 MG/DL (ref 8.4–10.2)
CHLORIDE SERPL-SCNC: 101 MMOL/L (ref 96–108)
CO2 SERPL-SCNC: 19 MMOL/L (ref 21–32)
CREAT SERPL-MCNC: <0.2 MG/DL (ref 0.6–1.3)
CRP SERPL QL: 180.4 MG/L
ERYTHROCYTE [DISTWIDTH] IN BLOOD BY AUTOMATED COUNT: 11.9 % (ref 11.6–15.1)
FERRITIN SERPL-MCNC: 137 NG/ML (ref 8–388)
FOLATE SERPL-MCNC: 19 NG/ML (ref 3.1–17.5)
GLUCOSE P FAST SERPL-MCNC: 108 MG/DL (ref 65–99)
GLUCOSE SERPL-MCNC: 108 MG/DL (ref 65–140)
HCT VFR BLD AUTO: 30.4 % (ref 34.8–46.1)
HEMOCCULT STL QL: NEGATIVE
HEMOCCULT STL QL: NORMAL
HEMOCCULT STL QL: NORMAL
HGB BLD-MCNC: 10.3 G/DL (ref 11.5–15.4)
IRON SATN MFR SERPL: 10 % (ref 15–50)
IRON SERPL-MCNC: 20 UG/DL (ref 50–170)
MCH RBC QN AUTO: 29.9 PG (ref 26.8–34.3)
MCHC RBC AUTO-ENTMCNC: 33.9 G/DL (ref 31.4–37.4)
MCV RBC AUTO: 88 FL (ref 82–98)
PLATELET # BLD AUTO: 315 THOUSANDS/UL (ref 149–390)
PMV BLD AUTO: 9.4 FL (ref 8.9–12.7)
POTASSIUM SERPL-SCNC: 3.9 MMOL/L (ref 3.5–5.3)
PROT SERPL-MCNC: 6.8 G/DL (ref 6.4–8.4)
RBC # BLD AUTO: 3.45 MILLION/UL (ref 3.81–5.12)
SODIUM SERPL-SCNC: 135 MMOL/L (ref 135–147)
TIBC SERPL-MCNC: 209 UG/DL (ref 250–450)
TSH SERPL DL<=0.05 MIU/L-ACNC: 0.54 UIU/ML (ref 0.45–4.5)
VIT B12 SERPL-MCNC: 92 PG/ML (ref 100–900)
WBC # BLD AUTO: 6.54 THOUSAND/UL (ref 4.31–10.16)

## 2023-03-08 RX ORDER — FOLIC ACID 1 MG/1
1 TABLET ORAL DAILY
Status: DISCONTINUED | OUTPATIENT
Start: 2023-03-08 | End: 2023-03-09

## 2023-03-08 RX ORDER — ACETAMINOPHEN 325 MG/1
975 TABLET ORAL 2 TIMES DAILY
Status: DISCONTINUED | OUTPATIENT
Start: 2023-03-08 | End: 2023-03-09 | Stop reason: HOSPADM

## 2023-03-08 RX ORDER — FERROUS SULFATE 325(65) MG
325 TABLET ORAL
Status: DISCONTINUED | OUTPATIENT
Start: 2023-03-09 | End: 2023-03-09 | Stop reason: HOSPADM

## 2023-03-08 RX ADMIN — CHOLECALCIFEROL TAB 25 MCG (1000 UNIT) 1000 UNITS: 25 TAB at 09:11

## 2023-03-08 RX ADMIN — LOSARTAN POTASSIUM 100 MG: 50 TABLET, FILM COATED ORAL at 09:10

## 2023-03-08 RX ADMIN — FOLIC ACID 1 MG: 1 TABLET ORAL at 17:27

## 2023-03-08 RX ADMIN — CALCIUM 1 TABLET: 500 TABLET ORAL at 09:09

## 2023-03-08 RX ADMIN — CALCIUM 1 TABLET: 500 TABLET ORAL at 17:27

## 2023-03-08 RX ADMIN — ACETAMINOPHEN 975 MG: 325 TABLET, FILM COATED ORAL at 17:28

## 2023-03-08 RX ADMIN — AMLODIPINE BESYLATE 5 MG: 5 TABLET ORAL at 09:10

## 2023-03-08 RX ADMIN — LEVOTHYROXINE SODIUM 88 MCG: 88 TABLET ORAL at 08:14

## 2023-03-08 RX ADMIN — DOCUSATE SODIUM 100 MG: 100 CAPSULE, LIQUID FILLED ORAL at 09:10

## 2023-03-08 RX ADMIN — CYANOCOBALAMIN TAB 500 MCG 1000 MCG: 500 TAB at 17:27

## 2023-03-08 NOTE — CASE MANAGEMENT
Case Management Assessment & Discharge Planning Note    Patient name Serenity Kang  [facility] [place] [facility] MRN [id number]  : 1945 Date 3/8/2023       Current Admission Date: 3/7/2023  Current Admission Diagnosis:Gait difficulty   Patient Active Problem List    Diagnosis Date Noted   • COVID-19 2023   • Anemia 2023   • Gait difficulty 2023   • Generalized weakness 2023   • S/P reverse total shoulder arthroplasty, right 2023   • Hyponatremia 2023   • Pre-operative clearance 2023   • Obesity, morbid ([address] ) 2022   • History of breast cancer 2020   • Abnormal blood sugar 2016   • Pre-diabetes 2016   • Osteoarthritis of knees, bilateral 2016   • Overweight 2016   • Hypertension 10/01/2015   • Hypothyroidism 10/01/2015      LOS (days): 0  Geometric Mean LOS (GMLOS) (days):   Days to GMLOS:     OBJECTIVE:        Current admission status: Observation       Preferred Pharmacy:   [id number], [address] [facility],[address], [address] [address]  Phone: [phone] Fax: [phone]    [facility] [address] [facility] [place] [address]  [address] [facility] [address] [place] [address] Rd [address]  Phone: [phone] Fax: [phone]    Primary Care Provider: YOANA Villaseñor    Primary Insurance: MEDICARE  Secondary Insurance:     ASSESSMENT:  D[employer]     Severiano Sessions   Health Care Representative - Spouse   Primary Phone: [phone] [facility])  Home Phone: [phone]               Advance Directives  Does patient have a [address]?: Yes  Does patient have Advance Directives?: No  Was patient offered paperwork?: Yes (declined at this time)  Primary Contact: Patient's spouse Marcy Fitch    Obs Notice Signed: 23    Readmission Root Cause  30 Day Readmission: No    Patient Information  Admitted from[de-identified] Home  Mental Status: Alert  During Assessment patient was accompanied by: Not accompanied during assessment  Assessment information provided by[de-identified] Spouse  Primary Caregiver: Spouse  Caregiver's Name[de-identified] Renuka Saul Relationship to Patient[de-identified] Significant Other  Caregiver's Telephone Number[de-identified] [phone]  Support Systems: Spouse/significant other, Family members  [place] of Residence: [address] [place] [address] do you live in?: [place]  Type of Current Residence: 2 story home  Upon entering residence, is there a bedroom on the main floor (no further steps)?: No  A bedroom is located on the following floor levels of residence (select all that apply):: 2nd Floor  Upon entering residence, is there a bathroom on the main floor (no further steps)?: No  Indicate which floors of current residence have a bathroom (select all the apply):: 2nd Floor  Number of steps to 2nd floor from main floor: One Flight (13 steps)  In the last 12 months, was there a time when you were not able to pay the mortgage or rent on time?: No  In the last 12 months, was there a time when you did not have a steady place to sleep or slept in a shelter (including now)?: No  Homeless/housing insecurity resource given?: No  Living Arrangements: Lives w/ Spouse/significant other, Lives w/ Son  Is patient a ?: No    Activities of Daily Living Prior to Admission  Functional Status: Independent  Completes ADLs independently?: Yes  Ambulates independently?: Yes  Does patient use assisted devices?: Yes  Assisted Devices (DME) used: Delfino Stalling, Wheelchair  Does patient currently own DME?: Yes  What DME does the patient currently own?: Delfino Stalling, Wheelchair  Does patient have a history of Outpatient Therapy (PT/OT)?: No  Does the patient have a history of Short-Term Rehab?: No  Does patient have a history of HHC?: No  Does patient currently have [address]?: No    Patient Information Continued  Income Source: Pension/senior care  Does patient have prescription coverage?: Yes  Food insecurity resource given?: No  Does patient receive dialysis treatments?: No  Does patient have a history of substance abuse?: No  Does patient have a history of Mental Health Diagnosis?: No    PHQ 2/9 Screening   Reviewed PHQ 2/9 Depression Screening Score?: No    Means of Transportation  Means of Transport to Appts[de-identified] Family transport  In the past 12 months, has lack of transportation kept you from medical appointments or from getting medications?: No  In the past 12 months, has lack of transportation kept you from meetings, work, or from getting things needed for daily living?: No  Was application for public transport provided?: No    DISCHARGE DETAILS:    Discharge planning discussed with[de-identified] patient's spouse  Freedom of Choice: Yes     CM contacted family/caregiver?: Yes  Were Treatment Team discharge recommendations reviewed with patient/caregiver?: Yes  Did patient/caregiver verbalize understanding of patient care needs?: N/A- going to facility  Were patient/caregiver advised of the risks associated with not following Treatment Team discharge recommendations?: Yes    Contacts  Patient Contacts: Vipul Leon spouse  Relationship to Patient[de-identified] Family  Contact Method: Phone  Phone Number: [phone]  Reason/Outcome: Continuity of Care, Emergency Contact, Discharge Planning    Other Referral/Resources/Interventions Provided:  Referral Comments: PT/OT evaluation pending, CM sent blanket inpatient rehab referrals per spouse request    CM attempted to speak with patient on the phone  No answer  CM spoke with patient's spouse Vipul Leon at   CM introduced self and role  Patient lives with spouse and son in a two level home  Patient's bathroom and bedroom are on the second floor  Patient has 13 steps to get upstairs  Patient was independent prior to pain in her bilateral knees  Patient has not been to inpatient rehab in the past  Patient's spouse is requesting inpatient rehab at discharge  Spouse aware that PT/OT evaluation pending   CM will f/u with him and patient regarding recommendations  CM discussed sending blanket referral to inpatient rehab facilities for possible options  Patient's spouse discussed [facility] as a preference  Spouse stated he will be at hospital later today to visit at bedside  CM will f/u with available options once determined  CM sent blanket referral for inpatient rehab  Waiting for options  Patient is Covid vaccinated x 2  No booster  CM reviewed discharge planning process including the following: identifying caregivers at home, preference for d/c planning needs,   availability of [facility] to [facility] program, availability of treatment team to discuss questions or concerns patient and/or family may have regarding diagnosis, plan of care, old or new medications and discharge planning   CM will continue to follow for care coordination and update assessment as appropriate

## 2023-03-08 NOTE — ASSESSMENT & PLAN NOTE
Iron panel iron level 20, iron sat 10%, B12 92, folate 19, TSH 0 535  · Vit D pending   · Add iron, Y45, and folic acid supplementation   · PT/OT - recommending STR

## 2023-03-08 NOTE — ASSESSMENT & PLAN NOTE
Patient was noted to be SARS-CoV-2 PCR positive in ED  History of COVID infection in 2020, received COVID-vaccine x2 but reported having diffuse neck pain postvaccine  Patient denies any URI or respiratory symptoms    Possibly contributing generalized weakness and poor appetite  Afebrile, saturating adequately on room air  · We will monitor symptoms  · Incentive spirometry  · -> 180  · Supportive care

## 2023-03-08 NOTE — PROGRESS NOTES
[place] 43  Progress Note - Louvella Seat 1945, [de-identified] y o  female MRN: [id number]  Unit/Bed#: [address] [place] [address] Encounter: [id number]  Primary Care Provider: YOANA Harkins   Date and time admitted to hospital: 3/7/2023  1:13 PM    COVID-19  Assessment & Plan  Patient was noted to be SARS-CoV-2 PCR positive in ED  History of COVID infection in 2020, received COVID-vaccine x2 but reported having diffuse neck pain postvaccine  Patient denies any URI or respiratory symptoms  Possibly contributing generalized weakness and poor appetite  Afebrile, saturating adequately on room air  · We will monitor symptoms  · Incentive spirometry  · -> 180  · Supportive care     S/P reverse total shoulder arthroplasty, right  Assessment & Plan  Status post reverse total shoulder arthroplasty, right 3/2/2023,  · Continue current postoperative care  · Right upper extremity sling  · Monitor exam  · Ortho evaluation appreciated   · No PT for 6 weeks   · PT/OT rec STR     Osteoarthritis of knees, bilateral  Assessment & Plan  Presented with worsening of bilateral knee/leg pain and swelling starting yesterday, significantly worse today  Bilateral knee x-ray with moderate to severe tricompartmental osteoarthritis and chondrocalcinosis  No evidence of erythema or acute inflammation  Likely flareup of osteoarthritis, unclear etiology  · Pain control  · Given IV steroid x1  ·  -> 180  · Orthopedic evaluation appreciated   · Continue PT/OT  · Pain control  · Add Tylenol 975 po BID     Hyponatremia  Assessment & Plan  · Suspect secondary to decreased po intake  · Hold IVF as Na 135  · Monitor    Anemia  Assessment & Plan  Hemoglobin noted to be 9 8 g/dL compared to 13 5 about a month ago  Patient denies any overt bleeding, hematemesis, hematochezia, dark stool but reports limited po intake  Denies NSAID use    Intraoperative blood loss was estimated 100 cc  Normocytic  · Vit D pending   · Iron panel iron level 20, iron sat 10%, B12 92, folate 19,  · Add iron, P38, and folic acid supplementation   · Stool occult  · Monitor H&H    Generalized weakness  Assessment & Plan  Iron panel iron level 20, iron sat 10%, B12 92, folate 19, TSH 0 535  · Vit D pending   · Add iron, S42, and folic acid supplementation   · PT/OT - recommending STR     Hypothyroidism  Assessment & Plan  · Continue Synthroid  · TSH 0 535    Hypertension  Assessment & Plan  · Stable  · Resume home meds, monitor    * Gait difficulty  Assessment & Plan  · Multifactorial secondary to recent right reverse shoulder arthroplasty, bilateral osteoarthritis, generalized weakness  · PT/OT rec STR      VTE Pharmacologic Prophylaxis: VTE Score: 4 High Risk (Score >/= 5) - Pharmacological DVT Prophylaxis Contraindicated  Sequential Compression Devices Ordered  Patient Centered Rounds: I performed bedside rounds with nursing staff today  Discussions with Specialists or Other Care Team Provider: nursing, CM    Education and Discussions with Family / Patient: Updated  () via phone  Total Time Spent on Date of Encounter in care of patient: 35 minutes This time was spent on one or more of the following: performing physical exam; counseling and coordination of care; obtaining or reviewing history; documenting in the medical record; reviewing/ordering tests, medications or procedures; communicating with other healthcare professionals and discussing with patient's family/caregivers  Current Length of Stay: 0 day(s)  Current Patient Status: Inpatient   Certification Statement: The patient will continue to require additional inpatient hospital stay due to anemia, PT eval, possible rehab   Discharge Plan: Anticipate discharge tomorrow to discharge location to be determined pending rehab evaluations  Code Status: Level 1 - Full Code    Subjective:   Patient seen and examined at bedside  Continues with right knee pain  Limited ROM  Notes some leg swelling  No right shoulder pain  Compliant with sling  Appetite improving  No SOB or CP  Objective:     Vitals:   Temp (24hrs), Av 3 °F (36 8 °C), Min:97 6 °F (36 4 °C), Max:99 4 °F (37 4 °C)    Temp:  [97 6 °F (36 4 °C)-99 4 °F (37 4 °C)] 98 °F (36 7 °C)  HR:  [63-89] 84  Resp:  [16-18] 16  BP: (108-171)/(58-77) 108/58  SpO2:  [94 %-98 %] 97 %  Body mass index is 21 95 kg/m²  Input and Output Summary (last 24 hours):   No intake or output data in the 24 hours ending 23 1710    Physical Exam:   Physical Exam  Vitals and nursing note reviewed  Constitutional:       General: She is not in acute distress  Appearance: She is ill-appearing  She is not toxic-appearing or diaphoretic  HENT:      Head: Normocephalic  Mouth/Throat:      Mouth: Mucous membranes are moist    Eyes:      Conjunctiva/sclera: Conjunctivae normal    Cardiovascular:      Rate and Rhythm: Normal rate  Pulmonary:      Effort: Pulmonary effort is normal       Breath sounds: Normal breath sounds  No wheezing, rhonchi or rales  Abdominal:      General: Bowel sounds are normal  There is no distension  Palpations: Abdomen is soft  Tenderness: There is no abdominal tenderness  Musculoskeletal:         General: Normal range of motion  Cervical back: Normal range of motion  Right lower leg: Edema (trace) present  Left lower leg: Edema (trace) present  Skin:     General: Skin is warm and dry  Capillary Refill: Capillary refill takes less than 2 seconds  Neurological:      Mental Status: She is alert and oriented to person, place, and time  Mental status is at baseline  Psychiatric:         Mood and Affect: Mood normal          Behavior: Behavior normal          Thought Content:  Thought content normal          Judgment: Judgment normal          Additional Data:     Labs:  Results from last 7 days   Lab Units 23  1027 23  1353   WBC Thousand/uL 6 54 7 35 HEMOGLOBIN g/dL 10 3* 9 8*   HEMATOCRIT % 30 4* 29 4*   PLATELETS Thousands/uL 315 252   NEUTROS PCT %  --  74   LYMPHS PCT %  --  13*   MONOS PCT %  --  12   EOS PCT %  --  0     Results from last 7 days   Lab Units 03/08/23  1027   SODIUM mmol/L 135   POTASSIUM mmol/L 3 9   CHLORIDE mmol/L 101   CO2 mmol/L 19*   BUN mg/dL 17   CREATININE mg/dL <0 20*   ANION GAP mmol/L 15*   CALCIUM mg/dL 9 2   ALBUMIN g/dL 3 4*   TOTAL BILIRUBIN mg/dL 1 30*   ALK PHOS U/L 74   ALT U/L 13   AST U/L 18   GLUCOSE RANDOM mg/dL 108                       Lines/Drains:  Invasive Devices     Peripheral Intravenous Line  Duration           Peripheral IV 03/07/23 Left Antecubital 1 day          Drain  Duration           External Urinary Catheter <1 day                      Imaging: Reviewed radiology reports from this admission including: chest xray, xray(s) and LEVDs    Recent Cultures (last 7 days):         Last 24 Hours Medication List:   Current Facility-Administered Medications   Medication Dose Route Frequency Provider Last Rate   • acetaminophen  650 mg Oral Q6H PRN Adele So MD     • acetaminophen  975 mg Oral BID YOANA Albrecht     • amLODIPine  5 mg Oral Daily Adele So MD     • calcium carbonate  1 tablet Oral BID With Meals Adele So MD     • cholecalciferol  1,000 Units Oral Daily Adele So MD     • cyanocobalamin  1,000 mcg Oral Daily YOANA Albrecht     • [START ON 3/9/2023] ferrous sulfate  325 mg Oral Daily With Breakfast YOANA Albrecht     • folic acid  1 mg Oral Daily YOANA Albrecht     • levothyroxine  88 mcg Oral Early Morning Adele So MD     • losartan  100 mg Oral Daily Adele So MD     • oxyCODONE  2 5 mg Oral Q4H PRN Adele So MD     • polyethylene glycol  17 g Oral Daily PRN Adele So MD     • senna  2 tablet Oral HS Adele So MD          Today, Patient Was Seen By: Mike Hudson YOANA    **Please Note: This note may have been constructed using a voice recognition system  **

## 2023-03-08 NOTE — CONSULTS
Consultation - Orthopedics   John Garcia [de-identified] y o  female MRN: [id number]  Unit/Bed#: [facility] 308-01 Encounter: [id number]        Physician Requesting Consult: Zachery Howard MD    Reason for Consult / Principal Problem: Status post right reverse total shoulder arthroplasty  Bilateral knee pain  HPI:   John Garcia is a [de-identified]y o  year old female who underwent right reverse total shoulder arthroplasty on 3/2/2023  The patient had been doing well from a shoulder perspective, but yesterday started with increasing knee pain, especially the right knee which was radiating down the legs at times  This was causing difficulty ambulating  She did present to the emergency department and had x-rays of the knees which showed severe tricompartmental arthritis of both knees  Vascular ultrasounds were also done which were negative for DVT, but did show right knee Baker's cyst   Patient notes she is doing well with her shoulder and complains of minimal pain about the shoulder at this point  She notes good sensation of the right upper extremity  Her main complaint is bilateral knee pain, with the right being worse than the left  This is worse with activity and better with rest   She does note a chronic history of knee arthritis  She did have injections many years ago  She notes good sensation of the bilateral lower extremities  She does complain of right knee swelling  1 dose of IV steroids was given yesterday evening  PT and OT have been consulted  Patient did also test positive for COVID upon admission  Inpatient consult to Orthopedic Surgery  Consult performed by: Kieran Faye PA-C  Consult ordered by: Zachery Howard MD          Review of Systems   Constitutional: Negative  Negative for chills and fever  HENT: Negative  Negative for ear pain and sore throat  Eyes: Negative  Negative for pain and redness  Respiratory: Negative  Negative for shortness of breath and wheezing      Cardiovascular: Negative for chest pain and palpitations  Gastrointestinal: Negative  Negative for abdominal pain and blood in stool  Endocrine: Negative  Negative for polydipsia and polyuria  Genitourinary: Negative  Negative for difficulty urinating and dysuria  Musculoskeletal:        As noted in HPI   Skin: Negative  Negative for pallor and rash  Neurological: Negative  Negative for dizziness and numbness  Hematological: Negative  Negative for adenopathy  Does not bruise/bleed easily  Psychiatric/Behavioral: Negative  Negative for confusion and suicidal ideas         Historical Information   Past Medical History:   Diagnosis Date   • Cancer ([facility] )    • Disease of thyroid gland    • Hyperlipidemia    • Hypertension    • 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 ([facility] )      Past Surgical History:   Procedure Laterality Date   • BREAST LUMPECTOMY Right    • HYSTERECTOMY      partial - age 42   • DC ARTHROPLASTY GLENOHUMERAL JOINT TOTAL SHOULDER Right 3/2/2023    Procedure: Reverse Total Shoulder Arthroplasty - SAME DAY DISCHARGE;  Surgeon: Jb Wilson MD;  [facility];  Service: Orthopedics     Social History   Social History     Substance and Sexual Activity   Alcohol Use Never     Social History     Substance and Sexual Activity   Drug Use No     Social History     Tobacco Use   Smoking Status Never   Smokeless Tobacco Never     Family History:   Family History   Problem Relation Age of Onset   • Other Mother         DJD, cervical   • Diabetes Father         DM   • Autism Son    • Substance Abuse Neg Hx    • Mental illness Neg Hx        Meds/Allergies   all current active meds have been reviewed and current meds:   Current Facility-Administered Medications   Medication Dose Route Frequency   • acetaminophen (TYLENOL) tablet 650 mg  650 mg Oral Q6H PRN   • amLODIPine (NORVASC) tablet 5 mg  5 mg Oral Daily   • calcium carbonate (OYSTER SHELL,OSCAL) 500 mg tablet 1 tablet 1 tablet Oral BID With Meals   • cholecalciferol (VITAMIN D3) tablet 1,000 Units  1,000 Units Oral Daily   • docusate sodium (COLACE) capsule 100 mg  100 mg Oral BID   • levothyroxine tablet 88 mcg  88 mcg Oral Early Morning   • losartan (COZAAR) tablet 100 mg  100 mg Oral Daily   • oxyCODONE (ROXICODONE) IR tablet 2 5 mg  2 5 mg Oral Q4H PRN   • polyethylene glycol (MIRALAX) packet 17 g  17 g Oral Daily PRN   • senna (SENOKOT) tablet 17 2 mg  2 tablet Oral HS   • sodium chloride 0 9 % infusion  50 mL/hr Intravenous Continuous     Allergies   Allergen Reactions   • Zoledronic Acid        Objective   Vitals: Blood pressure 121/62, pulse 63, temperature 98 9 °F (37 2 °C), temperature source Oral, resp  rate 17, height 5' 11" (1 803 m), weight 71 4 kg (157 lb 6 5 oz), SpO2 97 %  ,Body mass index is 21 95 kg/m²  Invasive Devices     Peripheral Intravenous Line  Duration           Peripheral IV 03/07/23 Left Antecubital <1 day          Drain  Duration           External Urinary Catheter <1 day                Physical Exam  Constitutional:       General: She is not in acute distress  Appearance: She is well-developed  HENT:      Head: Normocephalic and atraumatic  Eyes:      General: No scleral icterus  Conjunctiva/sclera: Conjunctivae normal    Neck:      Vascular: No JVD  Cardiovascular:      Rate and Rhythm: Normal rate  Pulmonary:      Effort: Pulmonary effort is normal  No respiratory distress  Skin:     General: Skin is warm  Neurological:      Mental Status: She is alert and oriented to person, place, and time  Coordination: Coordination normal           Ortho Exam   Right shoulder: Dressing had already been removed  Incision clean dry and intact  No erythema appreciated  No tenderness about the shoulder  Shoulder range of motion and strength testing deferred  Patient's sling is in place and well fitting  Patient moves all fingers freely    Sensation intact right upper extremity  Right knee: 1+ effusion  Medial and lateral joint line tenderness  No erythema or warmth  Range of motion 0 to 90 degrees with mild discomfort  Sensation intact right lower extremity  No calf tenderness  Left knee: No effusion  More mild medial and lateral joint line tenderness  No erythema or warmth  Range of motion 0 to 100 degrees with minimal discomfort  Sensation intact left lower extremity  No calf tenderness  Lab Results: I have personally reviewed pertinent lab results  CBC:   Lab Results   Component Value Date    WBC 7 35 03/07/2023    HGB 9 8 (L) 03/07/2023    HCT 29 4 (L) 03/07/2023    MCV 89 03/07/2023     03/07/2023    MCH 29 7 03/07/2023    MCHC 33 3 03/07/2023    RDW 12 1 03/07/2023    MPV 9 2 03/07/2023    NRBC 0 03/07/2023     CMP:   Lab Results   Component Value Date     04/28/2016    CL 98 03/07/2023     05/12/2022    CO2 27 03/07/2023    CO2 30 05/12/2022    BUN 14 03/07/2023    BUN 12 05/12/2022    CREATININE 0 48 (L) 03/07/2023    CREATININE 0 57 04/28/2016    GLUCOSE 96 04/28/2016    CALCIUM 9 3 03/07/2023    CALCIUM 10 1 05/12/2022    AST 17 03/07/2023    AST 20 05/12/2022    ALT 11 03/07/2023    ALT 12 05/12/2022    ALKPHOS 61 03/07/2023    ALKPHOS 89 05/12/2022    PROT 7 3 04/28/2016    BILITOT 0 9 04/28/2016    EGFR 92 03/07/2023     PT/INR:   Lab Results   Component Value Date    INR 1 03 01/27/2023       Imaging Studies: I have personally reviewed pertinent reports  and I have personally reviewed pertinent films in PACS  X-rays bilateral knees: Severe tricompartmental arthritis  Chondrocalcinosis  Assessment:  Status post right reverse total shoulder arthroplasty  Bilateral knee arthritis  Plan:  As for the shoulder, the patient will remain in her sling  No PT or OT for the shoulder until 6 weeks post op  Patient will be nonweightbearing in her sling  Analgesia as needed   Fu already scheduled with Dr Sandoval 65 for the shoulder  As for her knees, the patient does have significant tricompartmental arthritis of both knees  The right knee is more symptomatic at this point  Patient was given 1 dose of IV steroid  No repeat doses advised at this point  Analgesia as needed  PT and OT  Patient can follow-up outpatient with Dr Dominick Fried  There are no signs of infection  DVTs have been ruled out           Code Status: Level 1 - Full Code  Advance Directive and Living Will:      Power of :    POLST:

## 2023-03-08 NOTE — PHYSICAL THERAPY NOTE
PHYSICAL THERAPY EVALUATION/TREATMENT     03/08/23 1440   Note Type   Note type Evaluation   Pain Assessment   Pain Assessment Tool Camacho-Baker FACES   Camacho-Baker FACES Pain Rating 8  (R>L knee areas)   Restrictions/Precautions   Weight Bearing Precautions Per Order Yes   RUE Weight Bearing Per Order NWB   Braces or Orthoses Sling   Other Precautions Contact/isolation; Airborne/isolation; Chair Alarm; Bed Alarm; Fall Risk;Pain  (recent R shoulder surgery, NWB R UE)   Home Living   Type of [address] [place] [address] Two level;Bed/bath upstairs;Stairs to enter with rails  (2 stairs to enter)   [address]   [address]; Wheelchair-manual   Additional Comments Patient not using assistive device prior to admission although with extended history of arthritic function   Prior Function   Level of [place] Independent with ADLs; Independent with functional mobility   Lives With Spouse   Receives Help From Family   IADLs Independent with meal prep; Independent with medication management; Family/Friend/Other provides transportation   Comments Patient typically independent with ADLs although with restrictions since having right reverse total shoulder arthroplasty on 3/2/2023, patient nonweightbearing on the right upper extremity and in a sling at all times thus requiring assist with ADLs and IADLs prior to admission   General   Additional Pertinent History Chart reviewed, patient admitted with gait dysfunction, COVID    Patient's status post right shoulder surgery on 3/2/2023 returned home after several days developed bilateral knee pain gait dysfunction with inability to ambulate and care for self   Family/Caregiver Present Yes  ( present)   Cognition   Overall Cognitive Status WFL   Arousal/Participation Cooperative   Attention Within functional limits   Orientation Level Oriented X4   Following Commands Follows all commands and directions without difficulty   Subjective   Subjective Patient states feeling very weak and with pain and fearful of falling   RLE Assessment   RLE Assessment   (Range of motion within functional limits, strength 3/3+)   LLE Assessment   LLE Assessment   (Range of motion within functional limits, strength 3+/4 -)   Coordination   Movements are Fluid and Coordinated 0   Bed Mobility   Supine to Sit 3  Moderate assistance   Additional items Assist x 1   Additional Comments Patient out of bed in bedside chair at end of session   Transfers   Sit to Stand 3  Moderate assistance   Additional items Assist x 1   Stand to Sit 3  Moderate assistance   Additional items Assist x 1   Ambulation/Elevation   Gait Assistance 3  Moderate assist   Additional items Assist x 1   Assistive Device   (None/handhold)   Distance 5 feet antalgic type gait patterning and shuffling type steps with limited ability to lift right lower extremity off floor   Balance   Static Sitting Fair   Dynamic Sitting Fair -   Static Standing Fair -   Dynamic Standing Poor +   Ambulatory Poor +   Activity Tolerance   Activity Tolerance Patient limited by pain; Other (Comment)  (Limited by weakness and nonweightbearing on the right upper extremity)   Nurse Made Aware Yes   Assessment   Prognosis Good   Problem List Decreased strength;Decreased range of motion;Decreased endurance; Impaired balance;Decreased mobility; Decreased coordination;Pain;Orthopedic restrictions   Assessment Patient seen for Physical Therapy evaluation  Patient admitted with Gait difficulty  Comorbidities affecting patient's physical performance include:     Personal factors affecting patient at time of initial evaluation include: stairs to enter home, inability to ambulate household distances, inability to navigate community distances, inability to navigate level surfaces without external assistance, inability to perform dynamic tasks in community, limited home support, inability to perform physical activity, inability to perform ADLS and inability to perform IADLS   Prior to admission, patient was independent with functional mobility without assistive device, independent with ADLS, living in a multi-level home, ambulating household distance, ambulating community distances and home with family assist   Please find objective findings from Physical Therapy assessment regarding body systems outlined above with impairments and limitations including weakness, decreased ROM, impaired balance, decreased endurance, impaired coordination, gait deviations, pain, decreased activity tolerance, decreased functional mobility tolerance and fall risk  The Barthel Index was used as a functional outcome tool presenting with a score of Barthel Index Score: 40 today indicating marked limitations of functional mobility and ADLS  Patient's clinical presentation is currently unstable/unpredictable as seen in patient's presentation of vital sign response, changing level of pain, increased fall risk, new onset of impairment of functional mobility, decreased endurance and new onset of weakness  Pt would benefit from continued Physical Therapy treatment to address deficits as defined above and maximize level of functional mobility  As demonstrated by objective findings, the assigned level of complexity for this evaluation is high  The patient's AM-[facility] Basic Mobility Inpatient Short Form Raw Score is 11  A Raw score of less than or equal to 16 suggests the patient may benefit from discharge to post-acute rehabilitation services  Please also refer to the recommendation of the Physical Therapist for safe discharge planning     Goals   Patient Goals To get stronger and decrease pain   STG Expiration Date 03/15/23   Short Term Goal #1 Transfers and gait with cane with supervision   Short Term Goal #2 Gait endurance to functional household distances   LTG Expiration Date 03/22/23   Long Term Goal #1 Stairclimbing with min assist of 1   Long Term Goal #2 Transfers and gait without assistive device independently for functional household distances   Plan   Treatment/Interventions ADL retraining;Functional transfer training;LE strengthening/ROM; Elevations; Therapeutic exercise; Endurance training;Patient/family training;Equipment eval/education; Bed mobility;Gait training; Compensatory technique education   PT Frequency Other (Comment)  (5 times per week)   Recommendation   PT Discharge Recommendation Post acute rehabilitation services   AM-PAC Basic Mobility Inpatient   Turning in Flat Bed Without Bedrails 2   Lying on Back to Sitting on Edge of Flat Bed Without Bedrails 2   Moving Bed to Chair 2   Standing Up From Chair Using Arms 2   Walk in Room 2   Climb 3-5 Stairs With Railing 1   Basic Mobility Inpatient Raw Score 11   Basic Mobility Standardized Score 30 25   Highest Level Of Mobility   -[facility] Goal 4: Move to chair/commode   -[facility] Achieved 6: Walk 10 steps or more   Barthel Index   Feeding 5   Bathing 0   Grooming Score 0   Dressing Score 5   Bladder Score 10   Bowels Score 10   Toilet Use Score 5   Transfers (Bed/Chair) Score 5   Mobility (Level Surface) Score 0   Stairs Score 0   Barthel Index Score 40   Additional Treatment Session   Start Time 1425   End Time 1440   Treatment Assessment s: Patient reports improved pain in right knee with mobility, 6/10 on facial scale O: Bilateral lower extremity exercises completed as listed below A: Patient will benefit from continued physical therapy with progression as tolerated and short-term rehab prior to return home to regain independent function   Exercises   Quad Sets Supine;5 reps;Bilateral   Heelslides Supine;5 reps;Bilateral   Hip Flexion Sitting;5 reps;Bilateral   Hip Abduction Sitting;5 reps;Bilateral   Knee AROM Long Arc Quad Sitting;5 reps;Bilateral   Ankle Pumps Sitting;5 reps;Bilateral   Balance training  sidestepping and backward walking for   [employer] License Number  Alexandra Cerda PT [id number]

## 2023-03-08 NOTE — ASSESSMENT & PLAN NOTE
· Multifactorial secondary to recent right reverse shoulder arthroplasty, bilateral osteoarthritis, generalized weakness  · PT/OT rec STR

## 2023-03-08 NOTE — OCCUPATIONAL THERAPY NOTE
Occupational Therapy Evaluation/Treatment       03/08/23 7145   Note Type   Note type Evaluation   Pain Assessment   Pain Assessment Tool Camacho-Baker FACES   Camacho-Baker FACES Pain Rating 8   Pain Location/Orientation Orientation: Bilateral;Location: Knee   Restrictions/Precautions   Weight Bearing Precautions Per Order Yes   RUE Weight Bearing Per Order NWB   Braces or Orthoses Sling  (R shoulder sling)   Other Precautions Chair Alarm; Bed Alarm; Fall Risk;Pain;Contact/isolation; Airborne/isolation;Droplet precautions  (+Covid 19; s/p R reverse TSA 3/2/23)   Home Living   Type of [address] [place] [address] Multi-level;Stairs to enter with rails;Bed/bath upstairs  (2 COLLIN, no bathroom first floor)   Bathroom Shower/Tub Tub/shower unit   [facility]; [address]  ([address] wheelchair from friend after shoulder surgery)   Prior Function   Level of [place] Independent with ADLs; Independent with functional mobility   Lives With Spouse   Receives Help From Family   IADLs Independent with meal prep; Independent with medication management   Comments At baseline patient reports she is independent in ADLs and mobility without AD; patient is s/p R reeverse TSA by Dr Juany Samson on 3/2/23; since surgery has been getting assist from ADLs and iADLs from ; was ambulating independently until few days ago, starting having severe bilateral knee pain and unable to ambulate so came to ED   ADL   Eating Assistance 4  Minimal Assistance   Grooming Assistance 4  Minimal Assistance   UB Bathing Assistance 3  Moderate Assistance   LB Bathing Assistance 3  Moderate Assistance   UB Dressing Assistance 3  Moderate Assistance    [address] 3  Moderate Assistance   Toileting Assistance  4  Minimal Assistance   Bed Mobility   Supine to Sit 3  Moderate assistance   Additional items Assist x 1; Increased time required   Transfers   Sit to Stand 3  Moderate assistance   Additional items Assist x 1 Stand to Sit 3  Moderate assistance   Additional items Assist x 1   Functional Mobility   Functional Mobility 3  Moderate assistance   Additional Comments Ambulated few steps from bed to recliner chair with hand hold assist   Balance   Static Sitting Fair   Dynamic Sitting Fair   Static Standing Fair -   Dynamic Standing Poor +   Activity Tolerance   Activity Tolerance Patient limited by pain   RUE Assessment   RUE Assessment   (Elbow/wrist/hand WFL; shoulder not tested due to recent surgery, RUE in sling)   LUE Assessment   LUE Assessment WNL   Cognition   Overall Cognitive Status WFL   Arousal/Participation Alert; Cooperative   Attention Within functional limits   Orientation Level Oriented X4   Following Commands Follows all commands and directions without difficulty   Assessment   Limitation Decreased ADL status; Decreased UE ROM; Decreased UE strength;Decreased endurance;Decreased self-care trans;Decreased high-level ADLs   Prognosis Good   Assessment Patient evaluated by Occupational Therapy  Patient admitted with Gait difficulty  The patients occupational profile, medical and therapy history includes a extensive additional review of physical, cognitive, or psychosocial history related to current functional performance  Comorbidities affecting functional mobility and ADLS include: recent reverse R TSA 3/2/2023, HLD, HTN, breast cancer  Prior to admission, patient was independent with functional mobility without assistive device, independent with ADLS and independent with IADLS  The evaluation identifies the following performance deficits: weakness, decreased ROM, impaired balance, decreased endurance, increased fall risk, new onset of impairment of functional mobility, decreased ADLS, decreased IADLS, pain, decreased activity tolerance, decreased safety awareness, impaired judgement, ortheopedic restrictions and decreased strength, that result in activity limitations and/or participation restrictions   This evaluation requires clinical decision making of high complexity, because the patient presents with comorbidites that affect occupational performance and required significant modification of tasks or assistance with consideration of multiple treatment options  The Barthel Index was used as a functional outcome tool presenting with a score of Barthel Index Score: 40, indicating marked limitations of functional mobility and ADLS  The patient's raw score on the AM-PAC Daily Activity Inpatient Short Form is 15  A raw score of less than 19 suggests the patient may benefit from discharge to post-acute rehabilitation services  Please refer to the recommendation of the Occupational Therapist for safe discharge planning  Please refer to the recommendation of the Occupational Therapist for safe DC planning  Patient will benefit from skilled Occupational Therapy services to address above deficits and facilitate a safe return to prior level of function  Goals   Patient Goals To get stronger and be able to take care of myself   STG Time Frame   (1-7 days)   Short Term Goal  Goals established to promote Patient Goals: To get stronger and be able to take care of myself:  Eating: supervision; Grooming: supervision seated; Bathing: min assist; Upper Body Dressing min assist; Lower Body Dressing: min assist; Toileting: supervision; Patient will increase ambulatory standard toilet transfer to min assist with single point cane to increase performance and safety with ADLS and functional mobility; Patient will increase standing tolerance to 5 minutes during ADL task to decrease assistance level and decrease fall risk; Patient will increase bed mobility to min assist in preparation for ADLS and transfers;  Patient will increase functional mobility to and from bathroom with single point cane with min assist to increase performance with ADLS and to use a toilet; Patient will tolerate 5 minutes of UE ROM/strengthening to increase general activity tolerance and performance in ADLS/IADLS; Patient will improve functional activity tolerance to 5 minutes of sustained functional tasks to increase participation in basic self-care and decrease assistance level;  Patient will be able to to verbalize understanding and perform energy conservation/proper body mechanics during ADLS and functional mobility at least 75% of the time with minimal cueing to decrease signs of fatigue and increase stamina to return to prior level of function; Patient will increase static/dynamic sitting balance to fair+ to improve the ability to sit at edge of bed or on a chair for ADLS;  Patient will increase dynamic standing balance to fair- to improve postural stability and decrease fall risk during standing ADLS and transfers  LTG Time Frame   (8-14 days)   Long Term Goal Eating: independent; Grooming: independent seated; Bathing: supervision; Upper Body Dressing supervision; Lower Body Dressing: supervision; Toileting: independent; Patient will increase ambulatory standard toilet transfer to supervision with single point cane to increase performance and safety with ADLS and functional mobility; Patient will increase standing tolerance to 10 minutes during ADL task to decrease assistance level and decrease fall risk; Patient will increase bed mobility to supervision in preparation for ADLS and transfers;  Patient will increase functional mobility to and from bathroom with single point cane with supervision to increase performance with ADLS and to use a toilet; Patient will tolerate 10 minutes of UE ROM/strengthening to increase general activity tolerance and performance in ADLS/IADLS; Patient will improve functional activity tolerance to 10 minutes of sustained functional tasks to increase participation in basic self-care and decrease assistance level;  Patient will be able to to verbalize understanding and perform energy conservation/proper body mechanics during ADLS and functional mobility at least 90% of the time with no cueing to decrease signs of fatigue and increase stamina to return to prior level of function; Patient will increase static/dynamic sitting balance to good to improve the ability to sit at edge of bed or on a chair for ADLS;  Patient will increase static/dynamic standing balance to fair to improve postural stability and decrease fall risk during standing ADLS and transfers  Pt will score >/= 19/24 on AM-PAC Daily Activity Inpatient scale to promote safe independence with ADLs and functional mobility; Pt will score >/= 75/100 on Barthel Index in order to decrease caregiver assistance needed and increase ability to perform ADLs and functional mobility  Plan   Treatment Interventions ADL retraining;Functional transfer training;UE strengthening/ROM; Endurance training;Patient/family training;Equipment evaluation/education; Compensatory technique education;Continued evaluation; Energy conservation; Activityengagement   Goal Expiration Date 03/22/23   OT Frequency 3-5x/wk   Recommendation   OT Discharge Recommendation Post acute rehabilitation services   AM-[facility] Daily Activity Inpatient   Lower Body Dressing 2   Bathing 2   Toileting 3   Upper Body Dressing 2   Grooming 3   Eating 3   Daily Activity Raw Score 15   Daily Activity Standardized Score (Calc for Raw Score >=11) 34 69   AM-PAC Applied Cognition Inpatient   Following a Speech/Presentation 4   Understanding Ordinary Conversation 4   Taking Medications 4   Remembering Where Things Are Placed or Put Away 4   Remembering List of 4-5 Errands 4   Taking Care of Complicated Tasks 4   Applied Cognition Raw Score 24   Applied Cognition Standardized Score 62 21   Barthel Index   Feeding 5   Bathing 0   Grooming Score 0   Dressing Score 5   Bladder Score 10   Bowels Score 10   Toilet Use Score 5   Transfers (Bed/Chair) Score 5   Mobility (Level Surface) Score 0   Stairs Score 0   Barthel Index Score 40   Additional Treatment Session   Start Time 1400   End Time 4308   Treatment Assessment S: "I'm feeling a little better today" O: While seated patient able to don bilateral slip on sneakers with supervision; STS from chair with min assist; ambulatory transfer to/from [facility] with hand hold assist moderate assist, toilet hygiene with min assist; stand to sit to recliner chair with min assist; A: Patient reports improved pain in bilateral knees as compared to on admission; still requiring increased assistance with all ADLs and mobility; limited by recent shoulder surgery and new onset bilateral knee pain; at end of session seated in recliner chair with all needs met; chair alarm set; P: Sera Dimas 1348 License Number  Adrianna Bishop, OTR/L [id number]

## 2023-03-08 NOTE — ASSESSMENT & PLAN NOTE
Hemoglobin noted to be 9 8 g/dL compared to 13 5 about a month ago  Patient denies any overt bleeding, hematemesis, hematochezia, dark stool but reports limited po intake  Denies NSAID use    Intraoperative blood loss was estimated 100 cc  Normocytic  · Vit D pending   · Iron panel iron level 20, iron sat 10%, B12 92, folate 19,  · Add iron, Q91, and folic acid supplementation   · Stool occult  · Monitor H&H

## 2023-03-09 ENCOUNTER — TELEPHONE (OUTPATIENT)
Dept: OTHER | Facility: OTHER | Age: 78
End: 2023-03-09

## 2023-03-09 VITALS
TEMPERATURE: 97.9 F | HEART RATE: 68 BPM | RESPIRATION RATE: 18 BRPM | SYSTOLIC BLOOD PRESSURE: 111 MMHG | DIASTOLIC BLOOD PRESSURE: 58 MMHG | WEIGHT: 157.41 LBS | OXYGEN SATURATION: 97 % | BODY MASS INDEX: 22.04 KG/M2 | HEIGHT: 71 IN

## 2023-03-09 PROBLEM — E87.1 HYPONATREMIA: Status: RESOLVED | Noted: 2023-03-07 | Resolved: 2023-03-09

## 2023-03-09 LAB
25(OH)D3 SERPL-MCNC: 26.6 NG/ML (ref 30–100)
ALBUMIN SERPL BCP-MCNC: 3.1 G/DL (ref 3.5–5)
ALP SERPL-CCNC: 67 U/L (ref 34–104)
ALT SERPL W P-5'-P-CCNC: 24 U/L (ref 7–52)
ANION GAP SERPL CALCULATED.3IONS-SCNC: 6 MMOL/L (ref 4–13)
AST SERPL W P-5'-P-CCNC: 35 U/L (ref 13–39)
BILIRUB SERPL-MCNC: 0.7 MG/DL (ref 0.2–1)
BUN SERPL-MCNC: 31 MG/DL (ref 5–25)
CALCIUM ALBUM COR SERPL-MCNC: 10 MG/DL (ref 8.3–10.1)
CALCIUM SERPL-MCNC: 9.3 MG/DL (ref 8.4–10.2)
CHLORIDE SERPL-SCNC: 103 MMOL/L (ref 96–108)
CO2 SERPL-SCNC: 26 MMOL/L (ref 21–32)
CREAT SERPL-MCNC: 0.67 MG/DL (ref 0.6–1.3)
ERYTHROCYTE [DISTWIDTH] IN BLOOD BY AUTOMATED COUNT: 12.1 % (ref 11.6–15.1)
GFR SERPL CREATININE-BSD FRML MDRD: 83 ML/MIN/1.73SQ M
GLUCOSE SERPL-MCNC: 109 MG/DL (ref 65–140)
HCT VFR BLD AUTO: 25.6 % (ref 34.8–46.1)
HGB BLD-MCNC: 8.7 G/DL (ref 11.5–15.4)
MAGNESIUM SERPL-MCNC: 2 MG/DL (ref 1.9–2.7)
MCH RBC QN AUTO: 30 PG (ref 26.8–34.3)
MCHC RBC AUTO-ENTMCNC: 34 G/DL (ref 31.4–37.4)
MCV RBC AUTO: 88 FL (ref 82–98)
PHOSPHATE SERPL-MCNC: 3.7 MG/DL (ref 2.3–4.1)
PLATELET # BLD AUTO: 319 THOUSANDS/UL (ref 149–390)
PMV BLD AUTO: 9.3 FL (ref 8.9–12.7)
POTASSIUM SERPL-SCNC: 4.2 MMOL/L (ref 3.5–5.3)
PROT SERPL-MCNC: 5.8 G/DL (ref 6.4–8.4)
RBC # BLD AUTO: 2.9 MILLION/UL (ref 3.81–5.12)
SODIUM SERPL-SCNC: 135 MMOL/L (ref 135–147)
WBC # BLD AUTO: 9.53 THOUSAND/UL (ref 4.31–10.16)

## 2023-03-09 RX ORDER — MELATONIN
2000 DAILY
Refills: 0
Start: 2023-03-09

## 2023-03-09 RX ORDER — SENNOSIDES 8.6 MG
17.2 TABLET ORAL
Refills: 0
Start: 2023-03-09

## 2023-03-09 RX ORDER — OXYCODONE HYDROCHLORIDE 5 MG/1
2.5 TABLET ORAL EVERY 6 HOURS PRN
Qty: 5 TABLET | Refills: 0 | Status: SHIPPED | OUTPATIENT
Start: 2023-03-09 | End: 2023-03-25

## 2023-03-09 RX ORDER — POLYETHYLENE GLYCOL 3350 17 G/17G
17 POWDER, FOR SOLUTION ORAL DAILY PRN
Refills: 0
Start: 2023-03-09

## 2023-03-09 RX ORDER — FERROUS SULFATE 325(65) MG
325 TABLET ORAL
Refills: 0
Start: 2023-03-10

## 2023-03-09 RX ORDER — ACETAMINOPHEN 325 MG/1
975 TABLET ORAL 2 TIMES DAILY
Refills: 0
Start: 2023-03-09

## 2023-03-09 RX ORDER — FOLIC ACID 1 MG/1
1 TABLET ORAL DAILY
Refills: 0
Start: 2023-03-10 | End: 2023-03-09

## 2023-03-09 RX ADMIN — FERROUS SULFATE TAB 325 MG (65 MG ELEMENTAL FE) 325 MG: 325 (65 FE) TAB at 10:33

## 2023-03-09 RX ADMIN — AMLODIPINE BESYLATE 5 MG: 5 TABLET ORAL at 10:32

## 2023-03-09 RX ADMIN — CHOLECALCIFEROL TAB 25 MCG (1000 UNIT) 1000 UNITS: 25 TAB at 10:33

## 2023-03-09 RX ADMIN — ACETAMINOPHEN 975 MG: 325 TABLET, FILM COATED ORAL at 10:32

## 2023-03-09 RX ADMIN — CALCIUM 1 TABLET: 500 TABLET ORAL at 10:33

## 2023-03-09 RX ADMIN — LOSARTAN POTASSIUM 100 MG: 50 TABLET, FILM COATED ORAL at 10:39

## 2023-03-09 RX ADMIN — LEVOTHYROXINE SODIUM 88 MCG: 88 TABLET ORAL at 05:22

## 2023-03-09 RX ADMIN — CALCIUM 1 TABLET: 500 TABLET ORAL at 16:16

## 2023-03-09 RX ADMIN — FOLIC ACID 1 MG: 1 TABLET ORAL at 10:39

## 2023-03-09 RX ADMIN — CYANOCOBALAMIN TAB 500 MCG 1000 MCG: 500 TAB at 10:33

## 2023-03-09 NOTE — DISCHARGE SUMMARY
[place] 43  Discharge- Daenna Wyatt 1945, [de-identified] y o  female MRN: [id number]  Unit/Bed#: [address] [place] [address] Encounter: [id number]  Primary Care Provider: YOANA Garcia   Date and time admitted to hospital: 3/7/2023  1:13 PM    Generalized weakness  Assessment & Plan  Iron panel iron level 20, iron sat 10%, B12 92, folate 19, TSH 0 535  · Vit D 26  · Increase Vit D to 2,000 IU daily   · Added iron and B12 supplementation   · PT/OT - recommending STR     COVID-19  Assessment & Plan  Patient was noted to be SARS-CoV-2 PCR positive in ED  History of COVID infection in 2020, received COVID-vaccine x2 but reported having diffuse neck pain postvaccine  Patient denies any URI or respiratory symptoms  Possibly contributing generalized weakness and poor appetite  Afebrile, saturating adequately on room air  · Asymptomatic; on room air   · Incentive spirometry  · -> 180  · Supportive care     S/P reverse total shoulder arthroplasty, right  Assessment & Plan  Status post reverse total shoulder arthroplasty, right 3/2/2023,  · Continue current postoperative care  · Right upper extremity sling  · No PT to RUE for 6 weeks per ortho   · Follow-up with Dr Pebbles Chirinos   ·  mg daily until ortho follow-up     Osteoarthritis of knees, bilateral  Assessment & Plan  Presented with worsening of bilateral knee/leg pain and swelling starting yesterday, significantly worse today  Bilateral knee x-ray with moderate to severe tricompartmental osteoarthritis and chondrocalcinosis  No evidence of erythema or acute inflammation    Likely flareup of osteoarthritis, unclear etiology  · Given IV steroid x1  · Vit D low at 26 6  · Increase Vit D to 2,000 IU daily   · Orthopedic evaluation appreciated   · Recommended pain control and PT   · Add Tylenol 975 po BID with improvement  · DC to STR  · Oxycodone 2 5 mg po PRN     Anemia  Assessment & Plan  Hemoglobin noted to be 9 8 g/dL compared to 13 5 about a month ago  Patient denies any overt bleeding, hematemesis, hematochezia, dark stool but reports limited po intake  Denies NSAID use  Intraoperative blood loss was estimated 100 cc  Normocytic  · Iron panel iron level 20, iron sat 10%, B12 92, folate 19,  · Added iron and B12 supplementation     Hyponatremia-resolved as of 3/9/2023  Assessment & Plan  · Suspect secondary to decreased po intake  · Hold IVF as Na 135    Hypothyroidism  Assessment & Plan  · Continue Synthroid  · TSH 0 535    Hypertension  Assessment & Plan  · Stable  · Resume home meds, monitor    * Gait difficulty  Assessment & Plan  · Multifactorial secondary to recent right reverse shoulder arthroplasty, bilateral osteoarthritis, generalized weakness  · PT/OT rec STR      Medical Problems     Resolved Problems  Date Reviewed: 2/22/2023          Resolved    Hyponatremia 3/9/2023     Resolved by  Dima Hodgson        Discharging Physician / Practitioner: Dima Hodgson  PCP: Dima Dobson  Admission Date:   Admission Orders (From admission, onward)     Ordered        03/08/23 1555  Inpatient Admission  Once            03/07/23 1525  Place in Observation  Once                      Discharge Date: 03/09/23    Consultations During Hospital Stay:  · Orthopedics     Procedures Performed:   · Right knee xray: Moderate effusion with tricompartmental osteoarthritis and chondrocalcinosis  · Left knee xray: Moderate to severe tricompartmental osteoarthritis, most severe in the medial compartment  Chondrocalcinosis  · CXR: No acute cardiopulmonary disease  · LEVD:No evidence of acute or chronic DVT     Significant Findings / Test Results:   · Severe bilateral knee OA  · Iron 20  · B12 92  · Vit D 26    Incidental Findings:   ·  Lab values as above   · I reviewed the above mentioned incidental findings with the patient and/or family and they expressed understanding      Test Results Pending at Discharge (will require follow up): · None      Outpatient Tests Requested:  · None     Complications:  None     Reason for Admission: Leg weakness, swelling, and pain     Hospital Course:   Clary Naik is a [de-identified] y o  female patient with a PMH including hypertension, hypothyroidism, severe osteoarthritis, and recent right total shoulder arthroplasty on 3/2/2023 who originally presented to the hospital on 3/7/2023 due to worsening lower extremity swelling and pain  DVT studies were negative  Xrays revealed OA  She was seen by orthopedics who recommended pain control and PT  Patient was started on tylenol BID  She will [facility]  She was noted to have anemia  B12 and iron supplements were added  She is to continue RUE sling and no PT for 6 weeks postoperatively  She is to resume full-dose aspirin until follow-up with orthopedics  Please see above list of diagnoses and related plan for additional information  Condition at Discharge: stable    Discharge Day Visit / Exam:   Subjective:  Patient seen and examined at bedside  Resting oob in chair  Knee pain improved with scheduled tylenol - has better ROM today  RUE in sling  No cough or SOB  No chest pain, N/V/D  Desires discharge to STR  Vitals: Blood Pressure: 133/63 (03/09/23 1032)  Pulse: 62 (03/09/23 0300)  Temperature: 97 8 °F (36 6 °C) (03/09/23 0300)  Temp Source: Oral (03/09/23 0300)  Respirations: 18 (03/09/23 0300)  Height: 5' 11" (180 3 cm) (03/07/23 2135)  Weight - Scale: 71 4 kg (157 lb 6 5 oz) (03/07/23 1334)  SpO2: 95 % (03/09/23 0300)  Exam:   Physical Exam  Vitals and nursing note reviewed  Constitutional:       General: She is not in acute distress  Appearance: She is ill-appearing (deconditioned )  She is not toxic-appearing or diaphoretic  Comments: Pleasant female resting oob in chair on room air    HENT:      Head: Normocephalic        Mouth/Throat:      Mouth: Mucous membranes are moist    Eyes:      Conjunctiva/sclera: Conjunctivae normal    Cardiovascular:      Rate and Rhythm: Normal rate  Pulmonary:      Effort: Pulmonary effort is normal       Breath sounds: Normal breath sounds  No wheezing, rhonchi or rales  Abdominal:      General: Bowel sounds are normal  There is no distension  Palpations: Abdomen is soft  Tenderness: There is no abdominal tenderness  Musculoskeletal:      Cervical back: Normal range of motion  Right lower leg: No edema  Left lower leg: No edema  Comments: RUE in sling    Skin:     General: Skin is warm and dry  Capillary Refill: Capillary refill takes less than 2 seconds  Comments: Well-healing surgical incision to right shoulder    Neurological:      Mental Status: She is alert and oriented to person, place, and time  Mental status is at baseline  Motor: Weakness (generalized ) present  Psychiatric:         Mood and Affect: Mood normal          Behavior: Behavior normal          Thought Content: Thought content normal          Judgment: Judgment normal           Discussion with Family: Updated  () via phone  Discharge instructions/Information to patient and family:   See after visit summary for information provided to patient and family  Provisions for Follow-Up Care:  See after visit summary for information related to follow-up care and any pertinent home health orders  Disposition:   [facility]     Planned Readmission: None      Discharge Statement:  I spent > 30 minutes discharging the patient  This time was spent on the day of discharge  I had direct contact with the patient on the day of discharge  Greater than 50% of the total time was spent examining patient, answering all patient questions, arranging and discussing plan of care with patient as well as directly providing post-discharge instructions  Additional time then spent on discharge activities      Discharge Medications:  See after visit summary for reconciled discharge medications provided to patient and/or family        **Please Note: This note may have been constructed using a voice recognition system**

## 2023-03-09 NOTE — ASSESSMENT & PLAN NOTE
Iron panel iron level 20, iron sat 10%, B12 92, folate 19, TSH 0 535  · Vit D 26  · Increase Vit D to 2,000 IU daily   · Added iron and B12 supplementation   · PT/OT - recommending STR

## 2023-03-09 NOTE — CASE MANAGEMENT
Case Management Progress Note    Patient name Victoriano Martinez  Location 3 [facility]/3 [facility]-* MRN [id number]  : 1945 Date 3/9/2023       LOS (days): 1  Geometric Mean LOS (GMLOS) (days): 4 00  Days to GMLOS:3 2        OBJECTIVE:        Current admission status: Inpatient  Preferred Pharmacy:   [facility] [address] [facility] [id number], [facility], [address] [place] [address], [address] [place] [address] [address] [place] [address] [address] [address]  Phone: [phone] Fax: [address]  Cristy Steele [address] [address] [address]  [address] [facility],[address] [place] [address] [address] [address]  Phone: [phone] Fax: [phone]    Primary Care Provider: YOANA Kaiser    Primary Insurance: MEDICARE  Secondary Insurance:     PROGRESS NOTE:    CM reviewed in [address] and [place] available per Pari, but when CM reached out to [facility] for bed availability for today, Per Maik Brunner, no bed available today and [facility] not accepting Covid patients  CM made Gricelda Chet aware and also reached out to patient's  Venkat Fink regarding send out more referrals to facilities in [place]  Benigno brown  CM sent referral to Dora Loza, awaiting response

## 2023-03-09 NOTE — ASSESSMENT & PLAN NOTE
Patient was noted to be SARS-CoV-2 PCR positive in ED  History of COVID infection in 2020, received COVID-vaccine x2 but reported having diffuse neck pain postvaccine  Patient denies any URI or respiratory symptoms    Possibly contributing generalized weakness and poor appetite  Afebrile, saturating adequately on room air  · Asymptomatic; on room air   · Incentive spirometry  · -> 180  · Supportive care

## 2023-03-09 NOTE — [place] UNIVERSAL TRANSFER FORM
[place] UNIVERSAL TRANSFER FORM  (ALL ITEMS MUST BE COMPLETED)    1  TRANSFER FROM: Nitin5 S Pamela Rhoades      TRANSFER TO: Darcy Weathers    2  DATE OF TRANSFER: 3/9/2023                        TIME OF TRANSFER: 4pm     3  PATIENT NAME: Cyril Issa,        YOB: 1945                             GENDER: female    4  LANGUAGE:   English    5  PHYSICIAN NAME:  Juan Michelle MD                   PHONE: [phone]    6  CODE STATUS: Level 1 - Full Code        Out of Hospital DNR Attached: No    7  :                                      :  Extended Emergency Contact Information  Primary Emergency Contact: Benigno Fan  Address: [address] [place] [address]           [place], [address] Phone: [phone]  Mobile Phone: [phone]  Relation: Andria Ospina 316 Representative/Proxy:  No           Legal Guardian:  No             NAME OF:           HEALTH CARE REPRESENTATIVE/PROXY:                                         OR           LEGAL GUARDIAN, IF NOT :                                               PHONE:  (Day)           (Night)                        (Cell)    8  REASON FOR TRANSFER: (Must include brief medical history and recent changes in physical function or cognition ) STR            V/S: /63   Pulse 62   Temp 97 8 °F (36 6 °C) (Oral)   Resp 18   Ht 5' 11" (1 803 m)   Wt 71 4 kg (157 lb 6 5 oz)   SpO2 95%   BMI 21 95 kg/m²           PAIN: None    9  PRIMARY DIAGNOSIS: Gait difficulty      Secondary Diagnosis:         Pacemaker: No      Internal Defib: No          Mental Health Diagnosis (if Applicable):    10  RESTRAINTS: No     11  RESPIRATORY NEEDS: None    12  ISOLATION/PRECAUTION: None    13  ALLERGY: Zoledronic acid    14  SENSORY:       Vision Good    15  SKIN CONDITION: No Wounds    16  DIET: Regular    17  IV ACCESS: None    18   PERSONAL ITEMS SENT WITH PATIENT: None    19  ATTACHED DOCUMENTS: MUST ATTACH CURRENT MEDICATION INFORMATION Face Sheet, MAR and Medication Reconciliation    20  AT RISK ALERTS:Falls        HARM TO: N/A    21  WEIGHT BEARING STATUS:         Left Leg: None        Right Leg: None    22  MENTAL STATUS:Alert and Oriented    23  FUNCTION:        Walk: With Help        Transfer: With Help        Toilet: With Help        Feed: With Help    24  IMMUNIZATIONS/SCREENING:     Immunization History   Administered Date(s) Administered   • COVID-19 MODERNA VACC 0 5 ML IM 03/02/2021, 03/30/2021   • Influenza, high dose seasonal 0 7 mL 12/18/2018, 02/27/2020   • Pneumococcal Conjugate 13-Valent 12/18/2018       25  BOWEL: Continent    26  BLADDER: Continent    27   SENDING FACILITY CONTACT: Mckayla Toro                  Title: RN         Unit: 3N        Phone: [phone] [address] (if known):        Title:        Unit:         Phone:         FORM PREFILLED BY (if applicable)       Title:       Unit:        Phone:         Papo Birmingham RN      Title: ZAK      Phone: [phone]

## 2023-03-09 NOTE — PLAN OF CARE
Problem: Prexisting or High Potential for Compromised Skin Integrity  Goal: Skin integrity is maintained or improved  Description: INTERVENTIONS:  - Identify patients at risk for skin breakdown  - Assess and monitor skin integrity  - Assess and monitor nutrition and hydration status  - Monitor labs   - Assess for incontinence   - Turn and reposition patient  - Assist with mobility/ambulation  - Relieve pressure over bony prominences  - Avoid friction and shearing  - Provide appropriate hygiene as needed including keeping skin clean and dry  - Evaluate need for skin moisturizer/barrier cream  - Collaborate with interdisciplinary team   - Patient/family teaching  - Consider wound care consult   Outcome: Progressing     Problem: MOBILITY - ADULT  Goal: Maintain or return to baseline ADL function  Description: INTERVENTIONS:  -  Assess patient's ability to carry out ADLs; assess patient's baseline for ADL function and identify physical deficits which impact ability to perform ADLs (bathing, care of mouth/teeth, toileting, grooming, dressing, etc )  - Assess/evaluate cause of self-care deficits   - Assess range of motion  - Assess patient's mobility; develop plan if impaired  - Assess patient's need for assistive devices and provide as appropriate  - Encourage maximum independence but intervene and supervise when necessary  - Involve family in performance of ADLs  - Assess for home care needs following discharge   - Consider OT consult to assist with ADL evaluation and planning for discharge  - Provide patient education as appropriate  Outcome: Progressing  Goal: Maintains/Returns to pre admission functional level  Description: INTERVENTIONS:  - Perform BMAT or MOVE assessment daily    - Set and communicate daily mobility goal to care team and patient/family/caregiver  - Collaborate with rehabilitation services on mobility goals if consulted  - Perform Range of Motion 3 times a day    - Reposition patient every 2 hours   - Dangle patient 3 times a day  - Stand patient 3 times a day  - Ambulate patient 3 times a day  - Out of bed to chair 3 times a day   - Out of bed for meals 3 times a day  - Out of bed for toileting  - Record patient progress and toleration of activity level   Outcome: Progressing     Problem: SAFETY ADULT  Goal: Maintain or return to baseline ADL function  Description: INTERVENTIONS:  -  Assess patient's ability to carry out ADLs; assess patient's baseline for ADL function and identify physical deficits which impact ability to perform ADLs (bathing, care of mouth/teeth, toileting, grooming, dressing, etc )  - Assess/evaluate cause of self-care deficits   - Assess range of motion  - Assess patient's mobility; develop plan if impaired  - Assess patient's need for assistive devices and provide as appropriate  - Encourage maximum independence but intervene and supervise when necessary  - Involve family in performance of ADLs  - Assess for home care needs following discharge   - Consider OT consult to assist with ADL evaluation and planning for discharge  - Provide patient education as appropriate  Outcome: Progressing  Goal: Patient will remain free of falls  Description: INTERVENTIONS:  - Educate patient/family on patient safety including physical limitations  - Instruct patient to call for assistance with activity   - Consult OT/PT to assist with strengthening/mobility   - Keep Call bell within reach  - Keep bed low and locked with side rails adjusted as appropriate  - Keep care items and personal belongings within reach  - Initiate and maintain comfort rounds  - Make Fall Risk Sign visible to staff  - Offer Toileting every 2 Hours, in advance of need  - Initiate/Maintain bed/chair alarm  - Obtain necessary fall risk management equipment: bed alarm, yellow socks, call bell within reach   - Apply yellow socks and bracelet for high fall risk patients  - Consider moving patient to room near nurses station  Outcome: Progressing

## 2023-03-09 NOTE — NURSING NOTE
Report was given to nurse at [facility], no questions at this time  Patient left with transport personal via [employer]   took belongings with him

## 2023-03-09 NOTE — ASSESSMENT & PLAN NOTE
Presented with worsening of bilateral knee/leg pain and swelling starting yesterday, significantly worse today  Bilateral knee x-ray with moderate to severe tricompartmental osteoarthritis and chondrocalcinosis  No evidence of erythema or acute inflammation    Likely flareup of osteoarthritis, unclear etiology  · Given IV steroid x1  · Vit D low at 26 6  · Increase Vit D to 2,000 IU daily   · Orthopedic evaluation appreciated   · Recommended pain control and PT   · Add Tylenol 975 po BID with improvement  · DC to STR  · Oxycodone 2 5 mg po PRN

## 2023-03-09 NOTE — ASSESSMENT & PLAN NOTE
Status post reverse total shoulder arthroplasty, right 3/2/2023,  · Continue current postoperative care  · Right upper extremity sling  · No PT to RUE for 6 weeks per ortho   · Follow-up with Dr Malcolm Bergman   ·  mg daily until ortho follow-up

## 2023-03-09 NOTE — ASSESSMENT & PLAN NOTE
Hemoglobin noted to be 9 8 g/dL compared to 13 5 about a month ago  Patient denies any overt bleeding, hematemesis, hematochezia, dark stool but reports limited po intake  Denies NSAID use    Intraoperative blood loss was estimated 100 cc  Normocytic  · Iron panel iron level 20, iron sat 10%, B12 92, folate 19,  · Added iron and B12 supplementation

## 2023-03-09 NOTE — CASE MANAGEMENT
Case Management Discharge Planning Note    Patient name Cleopatra Marin  Location 3 [address]/3 [address] 308-* MRN [id number]  : 1945 Date 3/9/2023       Current Admission Date: 3/7/2023  Current Admission Diagnosis:Gait difficulty   Patient Active Problem List    Diagnosis Date Noted   • COVID-19 2023   • Anemia 2023   • Gait difficulty 2023   • Generalized weakness 2023   • S/P reverse total shoulder arthroplasty, right 2023   • Hyponatremia 2023   • Pre-operative clearance 2023   • Obesity, morbid ([facility] [address] ) 2022   • History of breast cancer 2020   • Abnormal blood sugar 2016   • Pre-diabetes 2016   • Osteoarthritis of knees, bilateral 2016   • Overweight 2016   • Hypertension 10/01/2015   • Hypothyroidism 10/01/2015      LOS (days): 1  Geometric Mean LOS (GMLOS) (days): 4 00  Days to GMLOS:3 1     OBJECTIVE:  Risk of Unplanned Readmission Score: 12 93         Current admission status: Inpatient   Preferred Pharmacy:   [facility] [id number], [facility], [address] [facility], [address] [place] [address] [address] [address]  Phone: [phone] Fax: [address] [place] [address] [address]  [address] [place] [address] [address] [address]  Phone: [phone] Fax: [phone]    Primary Care Provider: Dima Townsend [facility]    Primary Insurance: MEDICARE  Secondary Insurance:     DISCHARGE DETAILS:    Discharge planning discussed with[de-identified] patient's  Kevin Phillips of Choice: Yes  Comments - Freedom of Choice: Discussed STR preferences in [place] since [facility] did not have [facility] bed [facility] and [facility]  CM contacted family/caregiver?: Yes  Were Treatment Team discharge recommendations reviewed with patient/caregiver?: Yes  Did patient/caregiver verbalize understanding of patient care needs?: N/A- going to facility  Were patient/caregiver advised of the risks associated with not following Treatment Team discharge recommendations?: Yes    Contacts  Patient Contacts: Verloalyssa Moeller  Relationship to Patient[de-identified] Family  Contact Method: Phone  Phone Number: [phone]  Reason/Outcome: Discharge Planning, Emergency [address]         Is the patient interested in [place] [address] at discharge?: No    DME Referral Provided  Referral made for DME?: No    Other Referral/Resources/Interventions Provided:  Interventions: Short Term Rehab  Referral Comments: CM sent referrals to [place] [address], reserved [facility] as the location being closer to [place] per  Galindo's preference  [facility] transportation requestd via [employer] and  was confirmed for [address]  Ishaan Shaw, RN Angelica Steiner at [place] [address] patient's  Eric Ryan made aware of the same       Treatment Team Recommendation: Short Term Rehab  Discharge Destination Plan[de-identified] Short Term Rehab  Transport at Discharge : Wheelchair van  Dispatcher Contacted: Yes  Number/Name of Dispatcher: [employer]  Transported by [employer] and Unit #): Misael [phone]  ETA of Transport (Date): 03/09/23  ETA of Transport (Time): 1600

## 2023-03-09 NOTE — DISCHARGE INSTR - AVS FIRST PAGE
Osteoarthritis of knees:  Pain control with Tylenol 975 mg BID and Oxycodone PRN   PT/OT    Anemia:   Continue newly added B12 and Iron supplement  CBC in 1-2 weeks

## 2023-03-09 NOTE — PLAN OF CARE
Problem: SAFETY ADULT  Goal: Maintain or return to baseline ADL function  Description: INTERVENTIONS:  -  Assess patient's ability to carry out ADLs; assess patient's baseline for ADL function and identify physical deficits which impact ability to perform ADLs (bathing, care of mouth/teeth, toileting, grooming, dressing, etc )  - Assess/evaluate cause of self-care deficits   - Assess range of motion  - Assess patient's mobility; develop plan if impaired  - Assess patient's need for assistive devices and provide as appropriate  - Encourage maximum independence but intervene and supervise when necessary  - Involve family in performance of ADLs  - Assess for home care needs following discharge   - Consider OT consult to assist with ADL evaluation and planning for discharge  - Provide patient education as appropriate  Outcome: Progressing     Problem: SAFETY ADULT  Goal: Patient will remain free of falls  Description: INTERVENTIONS:  - Educate patient/family on patient safety including physical limitations  - Instruct patient to call for assistance with activity   - Consult OT/PT to assist with strengthening/mobility   - Keep Call bell within reach  - Keep bed low and locked with side rails adjusted as appropriate  - Keep care items and personal belongings within reach  - Initiate and maintain comfort rounds  - Make Fall Risk Sign visible to staff  - Offer Toileting every 2 Hours, in advance of need  - Initiate/Maintain bed/chair alarm  - Obtain necessary fall risk management equipment: fall risk sign  - Apply yellow socks and bracelet for high fall risk patients  - Consider moving patient to room near nurses station  Outcome: Progressing     Problem: Prexisting or High Potential for Compromised Skin Integrity  Goal: Skin integrity is maintained or improved  Description: INTERVENTIONS:  - Identify patients at risk for skin breakdown  - Assess and monitor skin integrity  - Assess and monitor nutrition and hydration status  - Monitor labs   - Assess for incontinence   - Turn and reposition patient  - Assist with mobility/ambulation  - Relieve pressure over bony prominences  - Avoid friction and shearing  - Provide appropriate hygiene as needed including keeping skin clean and dry  - Evaluate need for skin moisturizer/barrier cream  - Collaborate with interdisciplinary team   - Patient/family teaching  - Consider wound care consult   Outcome: Progressing

## 2023-03-11 NOTE — QUICK NOTE
rubén Medellin called and spoke with the unit clerk stating the Oxycodone Rx was not signed  I called the number provided for Tyler Black - rang for several minutes  No answer and no voicemail box option

## 2023-03-13 ENCOUNTER — NURSING HOME VISIT (OUTPATIENT)
Dept: GERIATRICS | Facility: OTHER | Age: 78
End: 2023-03-13

## 2023-03-13 VITALS
RESPIRATION RATE: 18 BRPM | OXYGEN SATURATION: 96 % | WEIGHT: 147.4 LBS | TEMPERATURE: 97.9 F | SYSTOLIC BLOOD PRESSURE: 136 MMHG | HEART RATE: 78 BPM | BODY MASS INDEX: 20.56 KG/M2 | DIASTOLIC BLOOD PRESSURE: 64 MMHG

## 2023-03-13 DIAGNOSIS — U07.1 COVID-19: Primary | ICD-10-CM

## 2023-03-13 NOTE — PROGRESS NOTES
[facility] [address], [address] [facility]  [id number]    Nursing Home Admission    NAME: Corby Ray  AGE: [de-identified] y o  SEX: female [id number]      Patient Location     [address] [place] [address] rehab    Patient’s care was coordinated with nursing facility staff  Recent vitals, labs and updated [facility]  Past Medical, surgical, social, medication and allergy history and patient’s previous records reviewed  Assessment/Plan:    COVID-19  Pt tested positive for Covid 19 in ED  Remained asymptomatic except had generalized weakness and poor oral intake  Respiratory status currently remains stable with SoO2 of 96 % on RA  Continue to monitor  Generalized weakness:   Continue PT OT  Recent vitamin D level was borderline low at 26  Vitamin D supplements were increased to 2000 units daily  Patient was additionally noted to have low iron and vitamin B12 levels which were supplemented  Status post reverse total shoulder arthroplasty, right:  Patient underwent reverse total shoulder arthroplasty on the right on 3/2/2023  Right upper extremity remains in a sling  Continue aspirin for DVT prophylaxis  Per records pt was recommended not to have  PT to right upper extremity for 6 weeks per Ortho    Osteoarthritis of knees, bilateral:   Patient compaint of bilateral knee/leg pain and swelling during recent hospitalization  Bilateral knee x-rays revealed moderate to severe try compartmental osteoarthritis and chondrocalcinosis  She was suspected to have flareup of osteoarthritis  Patient received steroid injection  Vitamin D supplementation was increased to 2000 units daily for borderline low level of 26 6  Continue PT OT  Patient remains on oxycodone 2 5 mg as needed for pain    Anemia:   She was noted to have drop in hemoglobin to 9 8 from 13 5 about a month ago  Intraoperative blood loss was estimated to be 100 cc    Patient denied any overt bleeding hematemesis or hematochezia  Iron and B12 levels were noted to be low with total iron of 20, iron saturation of 10% and B12 level of 92 with folate of 19  He was started on iron and B12 supplementation  Follow-up repeat CBC    Hyponatremia:  During recent hospitalization suspected to be from decreased p o  intake  Sodium level later improved    Hypothyroidism:  Continue Synthroid  Recent TSH was normal 8 535      Ambulatory dysfunction:  Continue PT OT    HTN:   BP stable on Losartan and Amlodipine    Chief Complaint     Lower extremity pain, osteoarthritis, generalized weakness recent right total shoulder arthroplasty  HPI       Patient is a [de-identified] y o  female with past medical history significant for hypertension, hypothyroidism, severe osteoarthritis and recent right total shoulder arthroplasty  Patient was hospitalized on 3/7/2023 with worsening lower extremity swelling and pain  DVT studies were negative  X-ray revealed osteoarthritis  She was seen by orthopedic service and underwent intra-articular steroid injection  Pt additionally tested positive for Covid 19 in ED  She remained asymptomatic except had generalized weakness and decrease appetite  Supportive treatment was recommended  Patient was seen by PT OT services and subsequently discharged to [facility] rehab where he is being seen for posthospital admission       Past Medical History:   Diagnosis Date   • Cancer [facility])    • Disease of thyroid gland    • Hyperlipidemia    • Hypertension    • 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 [facility])        Past Surgical History:   Procedure Laterality Date   • BREAST LUMPECTOMY Right    • HYSTERECTOMY      partial - age 42   • MO ARTHROPLASTY GLENOHUMERAL JOINT TOTAL SHOULDER Right 3/2/2023    Procedure: Reverse Total Shoulder Arthroplasty - SAME DAY DISCHARGE;  Surgeon: Houston Acuna MD;  Location: [address] [place] [address];  Service: Orthopedics       Social History     Tobacco Use   Smoking Status Never   Smokeless Tobacco Never          Family History   Problem Relation Age of Onset   • Other Mother         DJD, cervical   • Diabetes Father         DM   • Autism Son    • Substance Abuse Neg Hx    • Mental illness Neg Hx         Allergies   Allergen Reactions   • Zoledronic Acid           Current Outpatient Medications:   •  acetaminophen (TYLENOL) 325 mg tablet, Take 3 tablets (975 mg total) by mouth 2 (two) times a day, Disp: , Rfl: 0  •  amLODIPine (NORVASC) 5 mg tablet, Take 5 mg by mouth in the morning , Disp: , Rfl:   •  aspirin (ECOTRIN) 325 mg EC tablet, Take 1 tablet (325 mg total) by mouth daily, Disp: 30 tablet, Rfl: 0  •  CALCIUM PO, Take by mouth, Disp: , Rfl:   •  cholecalciferol (VITAMIN D3) 1,000 units tablet, Take 2 tablets (2,000 Units total) by mouth daily, Disp: , Rfl: 0  •  cyanocobalamin (VITAMIN B-12) 1000 MCG tablet, Take 1 tablet (1,000 mcg total) by mouth daily Do not start before March 10, 2023 , Disp: , Rfl: 0  •  ferrous sulfate 325 (65 Fe) mg tablet, Take 1 tablet (325 mg total) by mouth daily with breakfast Do not start before March 10, 2023 , Disp: , Rfl: 0  •  levothyroxine 88 mcg tablet, Take 1 tablet (88 mcg total) by mouth daily, Disp: 90 tablet, Rfl: 3  •  losartan (COZAAR) 100 MG tablet, Take 1 tablet (100 mg total) by mouth daily, Disp: 90 tablet, Rfl: 3  •  oxyCODONE (Roxicodone) 5 immediate release tablet, Take 0 5 tablets (2 5 mg total) by mouth every 6 (six) hours as needed for moderate pain or severe pain for up to 10 doses Max Daily Amount: 10 mg, Disp: 5 tablet, Rfl: 0  •  polyethylene glycol (MIRALAX) 17 g packet, Take 17 g by mouth daily as needed (constipation), Disp: , Rfl: 0  •  senna (SENOKOT) 8 6 mg, Take 2 tablets (17 2 mg total) by mouth daily at bedtime, Disp: , Rfl: 0    Updated list was reviewed in [facility] of facility       Vitals:    03/13/23 1141   BP: 136/64   Pulse: 78   Resp: 18   Temp: 97 9 °F (36 6 °C)   SpO2: 96%       Vital signs were reviewed in point click care    Review of Systems   Constitutional: Positive for appetite change (Decrease apptite)  Negative for chills and fever  HENT: Negative for nosebleeds and rhinorrhea  Eyes: Negative for discharge and redness  Respiratory: Negative for cough, chest tightness, shortness of breath, wheezing and stridor  Cardiovascular: Negative for chest pain and leg swelling  Gastrointestinal: Negative for abdominal distention, abdominal pain, diarrhea and vomiting  Genitourinary: Negative for dysuria, flank pain and hematuria  Musculoskeletal: Positive for arthralgias (right shoulder pain at times) and gait problem  Negative for back pain  Skin: Negative for pallor  Neurological: Positive for weakness (Generalized)  Negative for tremors, seizures, syncope and headaches  Psychiatric/Behavioral: Negative for agitation, behavioral problems and confusion  Physical Exam  Constitutional:       General: She is not in acute distress  HENT:      Head: Normocephalic and atraumatic  Eyes:      General:         Right eye: No discharge  Left eye: No discharge  Cardiovascular:      Rate and Rhythm: Normal rate and regular rhythm  Pulmonary:      Effort: No respiratory distress  Breath sounds: No wheezing or rales  Abdominal:      General: There is no distension  Palpations: Abdomen is soft  Tenderness: There is no abdominal tenderness  There is no guarding  Musculoskeletal:      Cervical back: Neck supple  Right lower leg: Edema present  Left lower leg: Edema present  Comments: RUE is in a sling  Mild edema involving b/l LE   Skin:     Coloration: Skin is not jaundiced  Comments: Incision over right shoulder anterior aspect, healing well  Neurological:      General: No focal deficit present  Mental Status: She is oriented to person, place, and time        Cranial Nerves: No cranial nerve deficit  Motor: Weakness present  Psychiatric:         Mood and Affect: Mood normal          Behavior: Behavior normal          Diagnostic Data     Iron 20, B12 92, vitamin D 26   recent labs and imaging studies were reviewed  Lab Results   Component Value Date    WBC 9 53 03/09/2023    HGB 8 7 (L) 03/09/2023    HCT 25 6 (L) 03/09/2023    MCV 88 03/09/2023     03/09/2023        Lab Results   Component Value Date    SODIUM 135 03/09/2023    K 4 2 03/09/2023     03/09/2023    CO2 26 03/09/2023    BUN 31 (H) 03/09/2023    CREATININE 0 67 03/09/2023    GLUC 109 03/09/2023    CALCIUM 9 3 03/09/2023     Code Status:      Full code    Additional notes:             This note was electronically signed by Dr Jennifer Berkowitz

## 2023-03-13 NOTE — ASSESSMENT & PLAN NOTE
Pt tested positive for Covid 19 in ED  Remained asymptomatic except had generalized weakness and poor oral intake  Respiratory status currently remains stable with SoO2 of 96 % on RA  Continue to monitor

## 2023-03-14 ENCOUNTER — TELEPHONE (OUTPATIENT)
Dept: OBGYN CLINIC | Facility: HOSPITAL | Age: 78
End: 2023-03-14

## 2023-03-14 NOTE — TELEPHONE ENCOUNTER
Called pts  and he said the pt is currently under quarantine in a rehab facility for Covid  He said he will reschedule her post op visit as soon as she is released

## 2023-03-14 NOTE — TELEPHONE ENCOUNTER
Caller:     Doctor: Milly    Reason for call: Would like to know if there is another type of sling or a medical supply store that can be recommended   The current sling is very uncomfortable around the next and catherine is scratching    Call back#: [phone]

## 2023-03-15 ENCOUNTER — NURSING HOME VISIT (OUTPATIENT)
Dept: GERIATRICS | Facility: OTHER | Age: 78
End: 2023-03-15

## 2023-03-15 ENCOUNTER — NURSING HOME VISIT (OUTPATIENT)
Dept: WOUND CARE | Facility: HOSPITAL | Age: 78
End: 2023-03-15

## 2023-03-15 DIAGNOSIS — U07.1 COVID-19: ICD-10-CM

## 2023-03-15 DIAGNOSIS — R53.1 GENERALIZED WEAKNESS: Primary | ICD-10-CM

## 2023-03-15 DIAGNOSIS — D64.9 ANEMIA, UNSPECIFIED TYPE: ICD-10-CM

## 2023-03-15 DIAGNOSIS — Z96.611 S/P REVERSE TOTAL SHOULDER ARTHROPLASTY, RIGHT: ICD-10-CM

## 2023-03-15 DIAGNOSIS — Z96.611 S/P REVERSE TOTAL SHOULDER ARTHROPLASTY, RIGHT: Primary | ICD-10-CM

## 2023-03-15 DIAGNOSIS — M17.0 OSTEOARTHRITIS OF BOTH KNEES, UNSPECIFIED OSTEOARTHRITIS TYPE: ICD-10-CM

## 2023-03-15 NOTE — PROGRESS NOTES
[address] MANAGEMENT   AND [facility]PER[facility]IC [facility]INE [facility]       Patient ID: Phigrady Horowitz is a [de-identified] y o  female Date of Birth 1945     Location of Service: [facility]    Performed wound round with: Wound team     Chief Complaint : Right shoulder    Wound Instructions:  Right shoulder surgical incision  Leave open to air  Continue to follow-up with surgeon  Monitor for any signs and symptoms of infection    Allergies  Zoledronic acid      Assessment & Plan:  1  S/P reverse total shoulder arthroplasty, right  Assessment & Plan:  Status post reverse total shoulder arthroplasty on March 2, 2023  · Surgical incision glued, intact, no drainage, no obvious sign of infection  · Patient needs to continue to use right upper extremity swelling  · Continue to follow-up with Ortho  · Sign off  Facility staff will continue to provide treatment and monitor the wound  Can reconsult wound nurse practitioner if wound failed to heal or worsened  Subjective:   March 15, 2023  This is a new consult for wound on the right shoulder  Patient have a complex medical history including but not limited to COVID, osteoarthritis of the knee, and hypothyroidism  Patient was seen with the facility wound team   Patient was recently admitted at [facility] from March 7 to March 9, 2023 for right total shoulder arthroplasty  Received patient in chair, currently on isolation for COVID  Seems comfortable  Denies pain  No significant issues related to the wound reported  Review of Systems   Constitutional: Negative  Respiratory: Negative  Objective:    Physical Exam  Constitutional:       Appearance: Normal appearance  HENT:      Head: Normocephalic  Nose: Nose normal       Mouth/Throat:      Pharynx: Oropharynx is clear  Eyes:      Conjunctiva/sclera: Conjunctivae normal    Cardiovascular:      Rate and Rhythm: Normal rate     Pulmonary:      Effort: Pulmonary effort is normal  Abdominal:      Tenderness: There is no abdominal tenderness  There is no guarding  Genitourinary:     Comments: continent  Musculoskeletal:         General: No tenderness  Cervical back: Normal range of motion  Right lower leg: No edema  Left lower leg: No edema  Comments: LROM   Skin:     Findings: Lesion present  Comments: Right shoulder: Surgical incision 9 cm, glued, no redness, approximated, no drainage, no obvious sign of infection   Neurological:      Mental Status: She is alert  Gait: Gait abnormal    Psychiatric:         Mood and Affect: Mood normal          Behavior: Behavior normal               Procedures           Patient's care was coordinated with nursing facility staff  Recent vitals, labs and updated medications were reviewed on EMR or chart system of facility  Past Medical, surgical, social, medication and allergy history and patient's previous records were reviewed and updated as appropriate: Most up-to date information is available in the facility EMR where the patient is currently admitted      Patient Active Problem List   Diagnosis   • Abnormal blood sugar   • Hypertension   • Hypothyroidism   • Osteoarthritis of knees, bilateral   • Overweight   • Pre-diabetes   • History of breast cancer   • Obesity, morbid (HCC)   • Pre-operative clearance   • COVID-19   • Anemia   • Gait difficulty   • Generalized weakness   • S/P reverse total shoulder arthroplasty, right     Past Medical History:   Diagnosis Date   • Cancer ([facility] [address] )    • Disease of thyroid gland    • Hyperlipidemia    • Hypertension    • 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 OR;  Service: Orthopedics     Social History     Socioeconomic History   • Marital status: /Civil Union     Spouse name: None   • Number of children: None   • Years of education: None   • Highest education level: None   Occupational History   • None   Tobacco Use   • Smoking status: Never   • Smokeless tobacco: Never   Vaping Use   • Vaping Use: Never used   Substance and Sexual Activity   • Alcohol use: Never   • Drug use: No   • Sexual activity: None   Other Topics Concern   • None   Social History Narrative    Dental care, regularly    Caffeine use    Tea     Social Determinants of Health     Financial Resource Strain: Not on file   Food Insecurity: Not on file   Transportation Needs: No Transportation Needs   • Lack of Transportation (Medical): No   • Lack of Transportation (Non-Medical):  No   Physical Activity: Not on file   Stress: Not on file   Social Connections: Not on file   Intimate Partner Violence: Not on file   Housing Stability: Unknown   • Unable to Pay for Housing in the Last Year: No   • Number of Places Lived in the Last Year: Not on file   • Unstable Housing in the Last Year: No        Current Outpatient Medications:   •  acetaminophen (TYLENOL) 325 mg tablet, Take 3 tablets (975 mg total) by mouth 2 (two) times a day, Disp: , Rfl: 0  •  amLODIPine (NORVASC) 5 mg tablet, Take 5 mg by mouth in the morning , Disp: , Rfl:   •  aspirin (ECOTRIN) 325 mg EC tablet, Take 1 tablet (325 mg total) by mouth daily, Disp: 30 tablet, Rfl: 0  •  CALCIUM PO, Take by mouth, Disp: , Rfl:   •  cholecalciferol (VITAMIN D3) 1,000 units tablet, Take 2 tablets (2,000 Units total) by mouth daily, Disp: , Rfl: 0  •  cyanocobalamin (VITAMIN B-12) 1000 MCG tablet, Take 1 tablet (1,000 mcg total) by mouth daily Do not start before March 10, 2023 , Disp: , Rfl: 0  •  ferrous sulfate 325 (65 Fe) mg tablet, Take 1 tablet (325 mg total) by mouth daily with breakfast Do not start before March 10, 2023 , Disp: , Rfl: 0  •  levothyroxine 88 mcg tablet, Take 1 tablet (88 mcg total) by mouth daily, Disp: 90 tablet, Rfl: 3  •  losartan (COZAAR) 100 MG tablet, Take 1 tablet (100 mg total) by mouth daily, Disp: 90 tablet, Rfl: 3  •  oxyCODONE (Roxicodone) 5 immediate release tablet, Take 0 5 tablets (2 5 mg total) by mouth every 6 (six) hours as needed for moderate pain or severe pain for up to 10 doses Max Daily Amount: 10 mg, Disp: 5 tablet, Rfl: 0  •  polyethylene glycol (MIRALAX) 17 g packet, Take 17 g by mouth daily as needed (constipation), Disp: , Rfl: 0  •  senna (SENOKOT) 8 6 mg, Take 2 tablets (17 2 mg total) by mouth daily at bedtime, Disp: , Rfl: 0  Family History   Problem Relation Age of Onset   • Other Mother         DJD, cervical   • Diabetes Father         DM   • Autism Son    • Substance Abuse Neg Hx    • Mental illness Neg Hx               Coordination of Care: Wound team aware of the treatment plan  Facility nurse will provide wound treatment and monitor the wound for any changes  Patient / Staff education : Patient / Staff was given education on sign of infection and pressure ulcer prevention  Patient/ Staff verbalized understanding     Follow up :  Sign off    Voice-recognition software may have been used in the preparation of this document  Occasional wrong word or "sound-alike" substitutions may have occurred due to the inherent limitations of voice recognition software  Interpretation should be guided by context        YOANA Pittman

## 2023-03-16 NOTE — PROGRESS NOTES
[address], [address], [facility], [address] [place] [address]  [phone]    NAME: Igor Daily  AGE: [de-identified] y o  SEX: female    Progress Note    Location: [facility]   POS: 31 (SNF)     Patient's care was coordinated with nursing facility staff  Recent vitals, labs, and updated medications were review on [facility] system in facility  Assessment/Plan:    Generalized weakness  Continue PT OT  Fall and safety precautions  Continue adequate po hydration and nutrition  remains on supplements and MVI  Optimize acute and chronic medical conditions  Sw following for d/c planning    COVID-19  Pt tested positive for Covid 19 in ED  Remained asymptomatic except had generalized weakness and poor oral intake  Respiratory status currently remains stable with SoO2 of 98%  on RA  Continue to monitor respiratory status     S/P reverse total shoulder arthroplasty, right  Patient underwent reverse total shoulder arthroplasty on the right on 3/2/2023  RUE remains in a sling  Continue aspirin for DVT prophylaxis  Per records pt was recommended not to have  PT to right upper extremity for 6 weeks per Ortho (starting 4/6/2023)  Remains afebrile and vss  Neuro vascular intact     Anemia  She was noted to have drop in hemoglobin to 9 8 from 13 5 about a month ago  Intraoperative blood loss was estimated to be 100 cc  Patient denied any overt bleeding hematemesis or hematochezia  During recent hospitalization Iron and B12 levels were noted to be low with total iron of 20, iron saturation of 10% and B12 level of 92 with folate of 19  Remains on Iron and B12 supplementation  Follow-up repeat CBC on 3/22     Osteoarthritis of knees, bilateral  Reports bilateral knee/leg pain and swelling during recent hospitalization  Bilateral knee x-rays revealed moderate to severe try compartmental osteoarthritis and chondrocalcinosis  She was suspected to have flareup of osteoarthritis    While in admitted in hospital pt recevied steroid injection  Vit D levels decreased at 26 6  Vitamin D supplementation was increased to 2000 units daily  Remain on Oxycodone 2 5 mg as needed for pain  Continue PT OT  Fall and safety precautions  Supportive care   Optimize pain control     Chief complaint / Reason for visit: STR F/U    History of Present Illness:  [de-identified]year old female seen and examined for STR F/U  Received pt sitting in w/c, resting  Pt reports feeling ok  No pain in RUE  Denies numbness and tingling  2+ pulses  Neurovascular intact  Reports good appetite  Consuming around 50-75% for all meals  Denies N/V/C/D  No edema  Free of chest pain or palpitations  Per nursing no other concerns or issues at this time  Review of Systems:  Per history of present illness, all other systems reviewed and negative  Other than those noted in HPI  HISTORY:  Medical Hx: Reviewed, unchanged  Family Hx: Reviewed, unchanged  Soc Hx: Reviewed,  unchanged    ALLERGY: Reviewed, unchanged  Allergies   Allergen Reactions   • Zoledronic Acid      PHYSICAL EXAM:  Vital Signs: /69, P 57, R 18, O2 97% RA, Temp 98 1   Weight: 147 4 lb     General: appear cooperative and supportive   Head: Atraumatic  Normocephalic  Eye Exam: anicteric sclera, no discharge, PERRLA, No injection  Oral Exam: moist mucous membranes, no buccaloropharyngeal erythema, palatine tonsils WNL  Neck Exam: no anterior cervical lymphadenopathy noted, neck supple  Cardiovascular: regular rate, regular rhythm, no murmurs, rubs, or gallops  Pulmonary: no wheeze, no rhonchi, no rales  No chest tenderness  Abdominal: soft, non-tender, nondistended, bowel sounds audible x 4 quadrants  : Non distended bladder  Voiding ok  Denies dysuria or hematuria  Extremities and skin: no edema noted, no rashes   Intact skin  Neurological: alert, cooperative and responsive, Oriented x 3, moving all 4 extremities symmetrically    Laboratory results / Imaging reviewed: Hard copy/ies in medical chart: Reviewed in [facility]    Current Medications: All medications reviewed and updated in [address] in pcc    Please note:  Voice-recognition software may have been used in the preparation of this document  Occasional wrong word or "sound-alike" substitutions may have occurred due to the inherent limitations of voice recognition software  Interpretation should be guided by context      Dima Contreras  3/15/2023

## 2023-03-16 NOTE — ASSESSMENT & PLAN NOTE
Status post reverse total shoulder arthroplasty on March 2, 2023  · Surgical incision glued, intact, no drainage, no obvious sign of infection  · Patient needs to continue to use right upper extremity swelling  · Continue to follow-up with Ortho  · Sign off  Facility staff will continue to provide treatment and monitor the wound  Can reconsult wound nurse practitioner if wound failed to heal or worsened

## 2023-03-21 ENCOUNTER — NURSING HOME VISIT (OUTPATIENT)
Dept: GERIATRICS | Facility: OTHER | Age: 78
End: 2023-03-21

## 2023-03-21 DIAGNOSIS — U07.1 COVID-19: ICD-10-CM

## 2023-03-21 DIAGNOSIS — I10 ESSENTIAL HYPERTENSION: ICD-10-CM

## 2023-03-21 DIAGNOSIS — R53.1 GENERALIZED WEAKNESS: ICD-10-CM

## 2023-03-21 DIAGNOSIS — I10 PRIMARY HYPERTENSION: ICD-10-CM

## 2023-03-21 DIAGNOSIS — Z96.611 S/P REVERSE TOTAL SHOULDER ARTHROPLASTY, RIGHT: Primary | ICD-10-CM

## 2023-03-21 DIAGNOSIS — M17.0 OSTEOARTHRITIS OF BOTH KNEES, UNSPECIFIED OSTEOARTHRITIS TYPE: ICD-10-CM

## 2023-03-21 DIAGNOSIS — E03.9 ACQUIRED HYPOTHYROIDISM: ICD-10-CM

## 2023-03-21 DIAGNOSIS — D64.9 ANEMIA, UNSPECIFIED TYPE: ICD-10-CM

## 2023-03-21 NOTE — ASSESSMENT & PLAN NOTE
She was noted to have drop in hemoglobin to 9 8 from 13 5 about a month ago  Intraoperative blood loss was estimated to be 100 cc  Patient denied any overt bleeding hematemesis or hematochezia  During recent hospitalization Iron and B12 levels were noted to be low with total iron of 20, iron saturation of 10% and B12 level of 92 with folate of 19  Remains on Iron and B12 supplementation    Follow-up repeat CBC on 3/22

## 2023-03-21 NOTE — ASSESSMENT & PLAN NOTE
Reports bilateral knee/leg pain and swelling during recent hospitalization  Bilateral knee x-rays revealed moderate to severe try compartmental osteoarthritis and chondrocalcinosis  She was suspected to have flareup of osteoarthritis  While in admitted in hospital pt recevied steroid injection  Vit D levels decreased at 26 6  Vitamin D supplementation was increased to 2000 units daily  Remain on Oxycodone 2 5 mg as needed for pain  Continue PT OT      Fall and safety precautions  Supportive care   Optimize pain control

## 2023-03-21 NOTE — ASSESSMENT & PLAN NOTE
Patient underwent reverse total shoulder arthroplasty on the right on 3/2/2023  RUE remains in a sling  Continue aspirin for DVT prophylaxis    Per records pt was recommended not to have  PT to right upper extremity for 6 weeks per Ortho (starting 4/6/2023)  Remains afebrile and vss  Neuro vascular intact

## 2023-03-21 NOTE — ASSESSMENT & PLAN NOTE
Continue PT OT  Fall and safety precautions  Continue adequate po hydration and nutrition   remains on supplements and MVI  Optimize acute and chronic medical conditions  Sw following for d/c planning

## 2023-03-21 NOTE — ASSESSMENT & PLAN NOTE
Pt tested positive for Covid 19 in ED  Remained asymptomatic except had generalized weakness and poor oral intake  Respiratory status currently remains stable with SoO2 of 98%  on RA    Continue to monitor respiratory status

## 2023-03-23 ENCOUNTER — NURSING HOME VISIT (OUTPATIENT)
Dept: GERIATRICS | Facility: OTHER | Age: 78
End: 2023-03-23

## 2023-03-23 DIAGNOSIS — I10 PRIMARY HYPERTENSION: ICD-10-CM

## 2023-03-23 DIAGNOSIS — R53.1 GENERALIZED WEAKNESS: ICD-10-CM

## 2023-03-23 DIAGNOSIS — Z96.611 S/P REVERSE TOTAL SHOULDER ARTHROPLASTY, RIGHT: ICD-10-CM

## 2023-03-23 DIAGNOSIS — D64.9 ANEMIA, UNSPECIFIED TYPE: ICD-10-CM

## 2023-03-23 DIAGNOSIS — U07.1 COVID-19: ICD-10-CM

## 2023-03-23 DIAGNOSIS — M17.0 OSTEOARTHRITIS OF BOTH KNEES, UNSPECIFIED OSTEOARTHRITIS TYPE: ICD-10-CM

## 2023-03-23 DIAGNOSIS — E03.9 ACQUIRED HYPOTHYROIDISM: Primary | ICD-10-CM

## 2023-03-23 DIAGNOSIS — R26.9 GAIT DIFFICULTY: ICD-10-CM

## 2023-03-23 NOTE — LETTER
March 25, 2023     [address] [place] [address]    Patient: Nirav Yung   YOB: 1945   Date of Visit: 3/23/2023       Dear Dr Geoffrey Arevalo: Thank you for referring Nirav Yung to me for evaluation  Below are my notes for this consultation  If you have questions, please do not hesitate to call me  I look forward to following your patient along with you  Sincerely,        YOANA Peoples        CC: No Recipients  Georgiana Griffith, [address]   3/25/2023  6:14 PM  Sign when Signing Visit  [facility]  [address]  [phone]  DISCHARGE SUMMARY  POS: STR 31  [facility]: [facility]     NAME: Nirav Yung  AGE: [de-identified] y o  SEX: female  DATE OF ADMISSION: 3/9/2023   DATE OF DISCHARGE: 3/25/2023  ISCHARGE DISPOSITION: Home     Reason for admission: Patient was admitted from [facility] for rehabilitation after hospitalization for worsening lower extremity swelling and pain  Admission Diagnoses: As mentioned above   Additional Problems:   Discharge Diagnoses: See problem list follow up recommendations below  Hypothyroidism  Continue levothyroxine   Last TSH 0 535 per records on 3/9/23  Follow up with PCP post d/c from [facility]     Hypertension  Bps stable  Remains on home regimen Losartan and Amlodipine  Monitor     Osteoarthritis of knees, bilateral  Reports bilateral knee/leg pain and swelling during recent hospitalization  Bilateral knee x-rays revealed moderate to severe try compartmental osteoarthritis and chondrocalcinosis  She was suspected to have flareup of osteoarthritis  While in admitted in hospital pt recevied steroid injection  Vit D levels decreased at 26 6  Vitamin D supplementation was increased to 2000 units daily  Continue PT OT      Fall and safety precautions  Supportive care   Optimize pain control   Follow with PCP post discharge from [facility]     S/P reverse total shoulder arthroplasty, right  Patient underwent reverse total right shoulder arthroplasty on 3/2/2023  RUE remains on collar sling  Incision approximated with skin glue  C/D/I no erythema or drainage  No swelling  RUE neurovascular intact  Pulses intact  denies numbness or tingling   Continue aspirin for DVT prophylaxis  Remains afebrile and vss  Neuro vascular intact  Per records was recommended not to have PT to right extremity per orthopedic services for 6 weeks  Follow up with orthopedic service post discharge      Generalized weakness  Continue PT OT  Fall and safety precautions  Continue adequate po hydration and nutrition  remains on supplements and MVI  Optimize acute and chronic medical conditions  Follow up with PCP post discharge from STR    Gait difficulty  Multifactorial secondary to recent right reverse shoulder arthroplasty, bilateral osteoarthritis, generalized weakness  PT/OT   Continue supportive care  Fall and safety precautions  Optimize acute and chronic medical conditions    COVID-19  Pt tested positive for Covid 19 in ED  Reports feeling better with good appetite  Respiratory status currently remains stable with SoO2 of 99% at rest on RA  Denies shortness of breath, cough or orthopnea  Continue to monitor respiratory status  Follow with PCP post d/c     Anemia  She was noted to have drop in hemoglobin to 9 8 from 13 5 about a month ago  Intraoperative blood loss was estimated to be 100 cc  Patient denied any overt bleeding hematemesis or hematochezia  During recent hospitalization Iron and B12 levels were noted to be low with total iron of 20, iron saturation of 10% and B12 level of 92 with folate of 19  Remains on Iron and B12 supplementation  CBC ordered for 3/22 however missed by lab  Pt denies lightheadedness or dizziness  With good appetite   No active bleeding noted or reported   Pt will follow up with PCP post discharge     Course of stay: Patient was admitted to [facility] for rehabilitation following hospitalization  Patient was hospitalized on 3/7/2023 with worsening lower extremity swelling and pain  DVT studies were negative  X-ray revealed osteoarthritis  She was seen by orthopedic service and underwent intra-articular steroid injection  Pt additionally tested positive for Covid 19 in ED  She remained asymptomatic except had generalized weakness and decrease appetite  Supportive treatment was recommended  At the time of my evaluation pt is doing ok  Reports she is feeling much better  Denies shortness of breath, cough or orthopnea  Respiratory status stable  SaO2 at 99% at rest RA  Pt did not require supplemental oxygen  Lungs equal and clear  RUE remains on collar sling  Incision approximated with glue  No erythema or drainage  RUE with decrease ROM  Neurovascular intact  2+ pulses  Tolerating diet with good appetite  Denies N/V/D/C  Bilateral lower extremity with no edema  Free of chest pain or palpitations  During the resident's stay at [facility], pt is scheduled to be discharged home on 3/25/2023  Pt refused home services  Patient does not need any medications  Labs and testing performed during stay: Reviewed from [facility]    Discharge Medications: See discharge medication list which was reviewed in [facility] and compared to facility orders for accuracy  Reviewed from [facility]    Status at time of discharge exam: Stable    Today's Visit: 3/23/50955:32 AM    Subjective: No acute or issues at this time    Review of systems: As per review of present illness all other systems reviewed and negative     Visit Vitals  /53   Pulse 58   Temp 97 8 °F (36 6 °C)   Resp 18   Wt 67 4 kg (148 lb 9 6 oz)   SpO2 99% Comment: at rest RA   BMI 20 73 kg/m²   OB Status Postmenopausal   Smoking Status Never   BSA 1 86 m²     Exam: Physical Exam  Vitals and nursing note reviewed  Constitutional:       Appearance: Normal appearance  She is not ill-appearing  HENT:      Head: Normocephalic and atraumatic  Nose: Nose normal    Eyes:      General:         Right eye: No discharge  Left eye: No discharge  Cardiovascular:      Rate and Rhythm: Normal rate and regular rhythm  Pulses: Normal pulses  Heart sounds: Normal heart sounds  Pulmonary:      Effort: Pulmonary effort is normal  No respiratory distress  Breath sounds: Normal breath sounds  No wheezing  Chest:      Chest wall: No tenderness  Abdominal:      General: Bowel sounds are normal  There is no distension  Palpations: Abdomen is soft  Tenderness: There is no abdominal tenderness  There is no guarding  Genitourinary:     Comments: Voiding ok  Denies dysuria or hematuria  Musculoskeletal:      Cervical back: Normal range of motion and neck supple  No rigidity or tenderness  Right lower leg: No edema  Left lower leg: No edema  Comments: RUE on collar sling  R shoulder incision approximated with skin glue  No erythema or drainage    Skin:     General: Skin is warm  Coloration: Skin is not jaundiced  Findings: No bruising or erythema  Neurological:      General: No focal deficit present  Mental Status: She is alert and oriented to person, place, and time  Cranial Nerves: No cranial nerve deficit  Motor: Weakness present  Gait: Gait abnormal    Psychiatric:         Mood and Affect: Mood normal          Judgment: Judgment normal      Discussion with patient/family and further instructions:  -Fall precautions  -Bleeding precautions  -Monitor for signs/symptoms of infection  -Medication list was reviewed    Follow-up Recommendations: Please follow-up with your primary care physician within 7-10 days of discharge to review medication changes and current status  Problem List Follow-up Recommendations:   Follow up PCP, Orthopedic service    I have spent >30 minutes with patient today in which greater than 50% of this time was spent in counseling/coordination of care regarding Diagnostic results, Prognosis, Risks and benefits of tx options, Instructions for management, Patient and family education, Importance of tx compliance, Risk factor reductions, Impressions, Counseling / Coordination of care, Documenting in the medical record, Reviewing / ordering tests, medicine, procedures  , Obtaining or reviewing history   and Communicating with other healthcare professionals   PCP made aware of discharge summary via epic communications  This note was completed in part utilizing [employer] direct voice recognition software  Grammatical errors, random word insertion, spelling mistakes, and incomplete sentences may be an occasional consequence of the system secondary to software limitations, ambient noise and hardware issues  At the time of dictation, efforts were made to edit, clarify and/or correct errors  Please read the chart carefully and recognize, using context, where substitutions have occurred  If you have any questions or concerns about the context, text or information contained within the body of this dictation, please contact myself, the provider, for further clarification      YOANA Sosa  2/98/59794:13 AM

## 2023-03-24 NOTE — PROGRESS NOTES
[facility]  [address] 77  [phone]  DISCHARGE SUMMARY  POS: STR 31  [facility]ility: [facility]     NAME: Bryan Khan  AGE: [de-identified] y o  SEX: female  DATE OF ADMISSION: 3/9/2023   DATE OF DISCHARGE: 3/25/2023  ISCHARGE DISPOSITION: Home     Reason for admission: Patient was admitted from [facility] for rehabilitation after hospitalization for worsening lower extremity swelling and pain  Admission Diagnoses: As mentioned above   Additional Problems:   Discharge Diagnoses: See problem list follow up recommendations below  Hypothyroidism  Continue levothyroxine   Last TSH 0 535 per records on 3/9/23  Follow up with PCP post d/c from [facility]     Hypertension  Bps stable  Remains on home regimen Losartan and Amlodipine  Monitor     Osteoarthritis of knees, bilateral  Reports bilateral knee/leg pain and swelling during recent hospitalization  Bilateral knee x-rays revealed moderate to severe try compartmental osteoarthritis and chondrocalcinosis  She was suspected to have flareup of osteoarthritis  While in admitted in hospital pt recevied steroid injection  Vit D levels decreased at 26 6  Vitamin D supplementation was increased to 2000 units daily  Continue PT OT  Fall and safety precautions  Supportive care   Optimize pain control   Follow with PCP post discharge from STR     S/P reverse total shoulder arthroplasty, right  Patient underwent reverse total right shoulder arthroplasty on 3/2/2023  RUE remains on collar sling  Incision approximated with skin glue  C/D/I no erythema or drainage  No swelling  RUE neurovascular intact  Pulses intact  denies numbness or tingling   Continue aspirin for DVT prophylaxis  Remains afebrile and vss  Neuro vascular intact  Per records was recommended not to have PT to right extremity per orthopedic services for 6 weeks  Follow up with orthopedic service post discharge      Generalized weakness  Continue PT OT      Fall and safety precautions  Continue adequate po hydration and nutrition  remains on supplements and MVI  Optimize acute and chronic medical conditions  Follow up with PCP post discharge from STR    Gait difficulty  Multifactorial secondary to recent right reverse shoulder arthroplasty, bilateral osteoarthritis, generalized weakness  PT/OT   Continue supportive care  Fall and safety precautions  Optimize acute and chronic medical conditions    COVID-19  Pt tested positive for Covid 19 in ED  Reports feeling better with good appetite  Respiratory status currently remains stable with SoO2 of 99% at rest on RA  Denies shortness of breath, cough or orthopnea  Continue to monitor respiratory status  Follow with PCP post d/c     Anemia  She was noted to have drop in hemoglobin to 9 8 from 13 5 about a month ago  Intraoperative blood loss was estimated to be 100 cc  Patient denied any overt bleeding hematemesis or hematochezia  During recent hospitalization Iron and B12 levels were noted to be low with total iron of 20, iron saturation of 10% and B12 level of 92 with folate of 19  Remains on Iron and B12 supplementation  CBC ordered for 3/22 however missed by lab  Pt denies lightheadedness or dizziness  With good appetite  No active bleeding noted or reported   Pt will follow up with PCP post discharge     Course of stay: Patient was admitted to [facility] for rehabilitation following hospitalization  Patient was hospitalized on 3/7/2023 with worsening lower extremity swelling and pain  DVT studies were negative  X-ray revealed osteoarthritis  She was seen by orthopedic service and underwent intra-articular steroid injection  Pt additionally tested positive for Covid 19 in ED  She remained asymptomatic except had generalized weakness and decrease appetite  Supportive treatment was recommended  At the time of my evaluation pt is doing ok  Reports she is feeling much better  Denies shortness of breath, cough or orthopnea  Respiratory status stable  SaO2 at 99% at rest RA  Pt did not require supplemental oxygen  Lungs equal and clear  RUE remains on collar sling  Incision approximated with glue  No erythema or drainage  RUE with decrease ROM  Neurovascular intact  2+ pulses  Tolerating diet with good appetite  Denies N/V/D/C  Bilateral lower extremity with no edema  Free of chest pain or palpitations  During the resident's stay at [facility], pt is scheduled to be discharged home on 3/25/2023  Pt refused home services  Patient does not need any medications  Labs and testing performed during stay: Reviewed from [facility]    Discharge Medications: See discharge medication list which was reviewed in [employer] and compared to facility orders for accuracy  Reviewed from [facility]    Status at time of discharge exam: Stable    Today's Visit: 3/23/03309:32 AM    Subjective: No acute or issues at this time    Review of systems: As per review of present illness all other systems reviewed and negative     Visit Vitals  /53   Pulse 58   Temp 97 8 °F (36 6 °C)   Resp 18   Wt 67 4 kg (148 lb 9 6 oz)   SpO2 99% Comment: at rest RA   BMI 20 73 kg/m²   OB Status Postmenopausal   Smoking Status Never   BSA 1 86 m²     Exam: Physical Exam  Vitals and nursing note reviewed  Constitutional:       Appearance: Normal appearance  She is not ill-appearing  HENT:      Head: Normocephalic and atraumatic  Nose: Nose normal    Eyes:      General:         Right eye: No discharge  Left eye: No discharge  Cardiovascular:      Rate and Rhythm: Normal rate and regular rhythm  Pulses: Normal pulses  Heart sounds: Normal heart sounds  Pulmonary:      Effort: Pulmonary effort is normal  No respiratory distress  Breath sounds: Normal breath sounds  No wheezing  Chest:      Chest wall: No tenderness  Abdominal:      General: Bowel sounds are normal  There is no distension  Palpations: Abdomen is soft  Tenderness:  There is no abdominal tenderness  There is no guarding  Genitourinary:     Comments: Voiding ok  Denies dysuria or hematuria  Musculoskeletal:      Cervical back: Normal range of motion and neck supple  No rigidity or tenderness  Right lower leg: No edema  Left lower leg: No edema  Comments: RUE on collar sling  R shoulder incision approximated with skin glue  No erythema or drainage    Skin:     General: Skin is warm  Coloration: Skin is not jaundiced  Findings: No bruising or erythema  Neurological:      General: No focal deficit present  Mental Status: She is alert and oriented to person, place, and time  Cranial Nerves: No cranial nerve deficit  Motor: Weakness present  Gait: Gait abnormal    Psychiatric:         Mood and Affect: Mood normal          Judgment: Judgment normal      Discussion with patient/family and further instructions:  -Fall precautions  -Bleeding precautions  -Monitor for signs/symptoms of infection  -Medication list was reviewed    Follow-up Recommendations: Please follow-up with your primary care physician within 7-10 days of discharge to review medication changes and current status  Problem List Follow-up Recommendations: Follow up PCP, Orthopedic service    I have spent >30 minutes with patient today in which greater than 50% of this time was spent in counseling/coordination of care regarding Diagnostic results, Prognosis, Risks and benefits of tx options, Instructions for management, Patient and family education, Importance of tx compliance, Risk factor reductions, Impressions, Counseling / Coordination of care, Documenting in the medical record, Reviewing / ordering tests, medicine, procedures  , Obtaining or reviewing history   and Communicating with other healthcare professionals   PCP made aware of discharge summary via epic communications  This note was completed in part utilizing m-modal fluency direct voice recognition software  Grammatical errors, random word insertion, spelling mistakes, and incomplete sentences may be an occasional consequence of the system secondary to software limitations, ambient noise and hardware issues  At the time of dictation, efforts were made to edit, clarify and/or correct errors  Please read the chart carefully and recognize, using context, where substitutions have occurred  If you have any questions or concerns about the context, text or information contained within the body of this dictation, please contact myself, the provider, for further clarification      YOANA Francois  4/98/83520:30 AM

## 2023-03-25 VITALS
DIASTOLIC BLOOD PRESSURE: 53 MMHG | TEMPERATURE: 97.8 F | WEIGHT: 148.6 LBS | RESPIRATION RATE: 18 BRPM | SYSTOLIC BLOOD PRESSURE: 141 MMHG | HEART RATE: 58 BPM | OXYGEN SATURATION: 99 % | BODY MASS INDEX: 20.73 KG/M2

## 2023-03-25 VITALS
RESPIRATION RATE: 18 BRPM | HEART RATE: 78 BPM | WEIGHT: 146.2 LBS | SYSTOLIC BLOOD PRESSURE: 156 MMHG | TEMPERATURE: 97.6 F | DIASTOLIC BLOOD PRESSURE: 82 MMHG | OXYGEN SATURATION: 97 % | BODY MASS INDEX: 20.39 KG/M2

## 2023-03-25 RX ORDER — SODIUM PHOSPHATE, DIBASIC AND SODIUM PHOSPHATE, MONOBASIC 7; 19 G/133ML; G/133ML
1 ENEMA RECTAL DAILY PRN
COMMUNITY

## 2023-03-25 RX ORDER — LOSARTAN POTASSIUM 100 MG/1
25 TABLET ORAL DAILY
COMMUNITY

## 2023-03-25 RX ORDER — BISACODYL 10 MG
10 SUPPOSITORY, RECTAL RECTAL ONCE AS NEEDED
COMMUNITY

## 2023-03-25 NOTE — ASSESSMENT & PLAN NOTE
Patient underwent reverse total right shoulder arthroplasty on 3/2/2023  RUE remains on collar sling  Incision approximated with skin glue  C/D/I no erythema or drainage  No swelling  RUE neurovascular intact  Pulses intact  denies numbness or tingling   Continue aspirin for DVT prophylaxis  Remains afebrile and vss  Neuro vascular intact  Per records was recommended not to have PT to right extremity per orthopedic services for 6 weeks   Follow up with orthopedic service post discharge

## 2023-03-25 NOTE — ASSESSMENT & PLAN NOTE
Pt tested positive for Covid 19 in ED  Remained asymptomatic except had generalized weakness and poor oral intake  Respiratory status currently remains stable with SoO2 of 97%  on RA    Continue to monitor respiratory status

## 2023-03-25 NOTE — ASSESSMENT & PLAN NOTE
Continue PT OT  Fall and safety precautions  Continue adequate po hydration and nutrition   remains on supplements and MVI  Optimize acute and chronic medical conditions  Follow up with PCP post discharge from STR

## 2023-03-25 NOTE — ASSESSMENT & PLAN NOTE
Patient underwent reverse total right shoulder arthroplasty on 3/2/2023  RUE remains on collar sling  Incision approximated with skin glue  C/D/I no erythema or drainage  No swelling  RUE neurovascular intact  Pulses intact  denies numbness or tingling   Continue aspirin for DVT prophylaxis  Remains afebrile and vss  Neuro vascular intact  Per records was recommended not to have PT to right extremity per orthopedic services for 6 weeks   Follow up with orthopedic service

## 2023-03-25 NOTE — ASSESSMENT & PLAN NOTE
Multifactorial secondary to recent right reverse shoulder arthroplasty, bilateral osteoarthritis, generalized weakness  PT/OT   Continue supportive care  Fall and safety precautions  Optimize acute and chronic medical conditions

## 2023-03-25 NOTE — ASSESSMENT & PLAN NOTE
Pt tested positive for Covid 19 in ED  Reports feeling better with good appetite  Respiratory status currently remains stable with SoO2 of 99% at rest on RA   Denies shortness of breath, cough or orthopnea  Continue to monitor respiratory status  Follow with PCP post d/c

## 2023-03-25 NOTE — ASSESSMENT & PLAN NOTE
She was noted to have drop in hemoglobin to 9 8 from 13 5 about a month ago  Intraoperative blood loss was estimated to be 100 cc  Patient denied any overt bleeding hematemesis or hematochezia  During recent hospitalization Iron and B12 levels were noted to be low with total iron of 20, iron saturation of 10% and B12 level of 92 with folate of 19  Remains on Iron and B12 supplementation  CBC ordered for 3/22 however missed by lab  Pt denies lightheadedness or dizziness  With good appetite   No active bleeding noted or reported   Pt will follow up with PCP post discharge

## 2023-03-25 NOTE — ASSESSMENT & PLAN NOTE
Reports bilateral knee/leg pain and swelling during recent hospitalization  Bilateral knee x-rays revealed moderate to severe try compartmental osteoarthritis and chondrocalcinosis  She was suspected to have flareup of osteoarthritis  While in admitted in hospital pt recevied steroid injection  Vit D levels decreased at 26 6  Vitamin D supplementation was increased to 2000 units daily  Continue PT OT      Fall and safety precautions  Supportive care   Optimize pain control   Follow with PCP post discharge from STR

## 2023-03-25 NOTE — PROGRESS NOTES
[address], [address], [facility], [address] [place] [address]  [phone]    NAME: Gail Mcclain  AGE: [de-identified] y o  SEX: female    Progress Note    Location: [facility]   POS: 31 (SNF)     Patient's care was coordinated with nursing facility staff  Recent vitals, labs, and updated medications were review on [facility] system in facility  Assessment/Plan:    S/P reverse total shoulder arthroplasty, right  Patient underwent reverse total right shoulder arthroplasty on 3/2/2023  RUE remains on collar sling  Incision approximated with skin glue  C/D/I no erythema or drainage  No swelling  RUE neurovascular intact  Pulses intact  denies numbness or tingling   Continue aspirin for DVT prophylaxis  Remains afebrile and vss  Neuro vascular intact  Per records was recommended not to have PT to right extremity per orthopedic services for 6 weeks  Follow up with orthopedic service      Hypertension  Bps stable  Remains on home regimen Losartan and Amlodipine  Monitor     Hypothyroidism  Continue levothyroxine   Last TSH 0 535 per records on 3/9/23    COVID-19  Pt tested positive for Covid 19 in ED  Remained asymptomatic except had generalized weakness and poor oral intake  Respiratory status currently remains stable with SoO2 of 97%  on RA  Continue to monitor respiratory status     Generalized weakness  Continue PT OT  Fall and safety precautions  Continue adequate po hydration and nutrition  remains on supplements and MVI  Optimize acute and chronic medical conditions  Sw following for d/c planning    Anemia  She was noted to have drop in hemoglobin to 9 8 from 13 5 about a month ago  Intraoperative blood loss was estimated to be 100 cc  Patient denied any overt bleeding hematemesis or hematochezia  During recent hospitalization Iron and B12 levels were noted to be low with total iron of 20, iron saturation of 10% and B12 level of 92 with folate of 19    Remains on Iron and B12 supplementation  Follow-up repeat CBC on 3/22   CBC on 3/22   Chief complaint / Reason for visit: STR F/U    History of Present Illness:  [de-identified]year old female seen and examined for STR F/U  Received pt sitting in w/c, resting  Pt reports feeling ok  No pain in RUE  RUE remains on collar sling  Incision approximated C/C/I  No erythema or drainage noted  Denies numbness and tingling  2+ pulses  Neurovascular intact  Reports good appetite  Consuming around 50-75% for all meals  Denies N/V/C/D  No edema  Free of chest pain or palpitations  Per nursing no other concerns or issues at this time  Review of Systems:  Per history of present illness, all other systems reviewed and negative  Other than those noted in HPI  HISTORY:  Medical Hx: Reviewed, unchanged  Family Hx: Reviewed, unchanged  Soc Hx: Reviewed,  unchanged    ALLERGY: Reviewed, unchanged  Allergies   Allergen Reactions   • Zoledronic Acid      PHYSICAL EXAM:  Visit Vitals  /82   Pulse 78   Temp 97 6 °F (36 4 °C)   Resp 18   Wt 66 3 kg (146 lb 3 2 oz)   SpO2 97%   BMI 20 39 kg/m²   OB Status Postmenopausal   Smoking Status Never   BSA 1 85 m²     General: appear cooperative and supportive   Head: Atraumatic  Normocephalic  Eye Exam: anicteric sclera, no discharge, PERRLA, No injection  Oral Exam: moist mucous membranes, no buccaloropharyngeal erythema, palatine tonsils WNL  Neck Exam: no anterior cervical lymphadenopathy noted, neck supple  Cardiovascular: regular rate, regular rhythm, no murmurs, rubs, or gallops  Pulmonary: no wheeze, no rhonchi, no rales  No chest tenderness  Abdominal: soft, non-tender, nondistended, bowel sounds audible x 4 quadrants  : Non distended bladder  Voiding ok  Denies dysuria or hematuria  Extremities and skin: no edema noted, no rashes  RUE on collar sling  Right shoulder incision approximated +glue  No erythema or drainage  no active sign of infection active    Neurological: alert, cooperative and responsive, Oriented x 3  Right shoulder decrease ROM  Laboratory results / Imaging reviewed: Hard copy/ies in medical chart: Reviewed in [facility]    Current Medications: All medications reviewed and updated in [address] in [facility]    Please note:  Voice-recognition software may have been used in the preparation of this document  Occasional wrong word or "sound-alike" substitutions may have occurred due to the inherent limitations of voice recognition software  Interpretation should be guided by context      YOANA Mcguire  3/21/2023

## 2023-03-25 NOTE — ASSESSMENT & PLAN NOTE
She was noted to have drop in hemoglobin to 9 8 from 13 5 about a month ago  Intraoperative blood loss was estimated to be 100 cc  Patient denied any overt bleeding hematemesis or hematochezia  During recent hospitalization Iron and B12 levels were noted to be low with total iron of 20, iron saturation of 10% and B12 level of 92 with folate of 19  Remains on Iron and B12 supplementation    Follow-up repeat CBC on 3/22   CBC on 3/22

## 2023-03-28 ENCOUNTER — TRANSITIONAL CARE MANAGEMENT (OUTPATIENT)
Dept: FAMILY MEDICINE CLINIC | Facility: CLINIC | Age: 78
End: 2023-03-28

## 2023-04-04 ENCOUNTER — APPOINTMENT (OUTPATIENT)
Dept: RADIOLOGY | Facility: CLINIC | Age: 78
End: 2023-04-04

## 2023-04-04 ENCOUNTER — OFFICE VISIT (OUTPATIENT)
Dept: OBGYN CLINIC | Facility: CLINIC | Age: 78
End: 2023-04-04

## 2023-04-04 VITALS — SYSTOLIC BLOOD PRESSURE: 150 MMHG | DIASTOLIC BLOOD PRESSURE: 76 MMHG | HEART RATE: 78 BPM | TEMPERATURE: 98 F

## 2023-04-04 DIAGNOSIS — Z96.611 S/P REVERSE TOTAL SHOULDER ARTHROPLASTY, RIGHT: Primary | ICD-10-CM

## 2023-04-04 NOTE — PROGRESS NOTES
Assessment/Plan:  1  S/P reverse total shoulder arthroplasty, right  XR shoulder 2+ vw right    Ambulatory Referral to Physical Therapy        The patient is doing well and xrays look quite good today with no evidence of fracture  Her elbow is quite stiff from her sling  I would like her to discontinue her sling, except for sleeping at night for another r2 weeks  Then she can remove the sling at all times  She will start PT in 2 more weeks on the reverse total shoulder arthoplasty  She can ice and take OTC medications as needed for discomfort  She will FU in 6 weeks for repeat evaluation  Subjective:   Beau Valverde is a [de-identified] y o  female who presents today for follow-up of her right shoulder, now about a month status post reverse total shoulder arthroplasty  She was readmitted to the hospital post operatively due to some medical issues and then was sent to rehab  She has also tested positive for COVID  Post operatively  This is the first time we are seeing her since her surgery  She has been compliant with wearing his sling  She notes minimal pain about the shoulder, but complains of some pain about the elbow  She notes good sensation of the right upper extremity  She has not started any physical therapy yet  We had held off on this due to a small crack in the bone proximally          Review of Systems      Past Medical History:   Diagnosis Date   • Cancer ([facility] [address] )    • Disease of thyroid gland    • Hyperlipidemia    • Hypertension    • 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 [facility])        Past Surgical History:   Procedure Laterality Date   • BREAST LUMPECTOMY Right    • HYSTERECTOMY      partial - age 42   • MI ARTHROPLASTY GLENOHUMERAL JOINT TOTAL SHOULDER Right 3/2/2023    Procedure: Reverse Total Shoulder Arthroplasty - SAME DAY DISCHARGE;  Surgeon: Khushbu Davenport MD;  Location: [facility];  Service: Orthopedics       Family History   Problem Relation Age of Onset   • Other Mother DJD, cervical   • Diabetes Father         DM   • Autism Son    • Substance Abuse Neg Hx    • Mental illness Neg Hx        Social History     Occupational History   • Not on file   Tobacco Use   • Smoking status: Never   • Smokeless tobacco: Never   Vaping Use   • Vaping Use: Never used   Substance and Sexual Activity   • Alcohol use: Never   • Drug use: No   • Sexual activity: Not on file         Current Outpatient Medications:   •  acetaminophen (TYLENOL) 325 mg tablet, Take 3 tablets (975 mg total) by mouth 2 (two) times a day, Disp: , Rfl: 0  •  amLODIPine (NORVASC) 5 mg tablet, Take 5 mg by mouth in the morning , Disp: , Rfl:   •  aspirin (ECOTRIN) 325 mg EC tablet, Take 1 tablet (325 mg total) by mouth daily, Disp: 30 tablet, Rfl: 0  •  CALCIUM PO, Take 600 mg by mouth in the morning, Disp: , Rfl:   •  cholecalciferol (VITAMIN D3) 1,000 units tablet, Take 2 tablets (2,000 Units total) by mouth daily, Disp: , Rfl: 0  •  cyanocobalamin (VITAMIN B-12) 1000 MCG tablet, Take 1 tablet (1,000 mcg total) by mouth daily Do not start before March 10, 2023   (Patient not taking: Reported on 3/28/2023), Disp: , Rfl: 0  •  ferrous sulfate 325 (65 Fe) mg tablet, Take 1 tablet (325 mg total) by mouth daily with breakfast Do not start before March 10, 2023 , Disp: , Rfl: 0  •  levothyroxine 88 mcg tablet, Take 1 tablet (88 mcg total) by mouth daily, Disp: 90 tablet, Rfl: 3  •  losartan (COZAAR) 100 MG tablet, Take 25 mg by mouth daily, Disp: , Rfl:   •  losartan (COZAAR) 100 MG tablet, Take 25 mg by mouth daily (Patient not taking: Reported on 3/28/2023), Disp: , Rfl:   •  polyethylene glycol (MIRALAX) 17 g packet, Take 17 g by mouth daily as needed (constipation), Disp: , Rfl: 0  •  senna (SENOKOT) 8 6 mg, Take 2 tablets (17 2 mg total) by mouth daily at bedtime (Patient not taking: Reported on 3/28/2023), Disp: , Rfl: 0    Allergies   Allergen Reactions   • Zoledronic Acid        Objective:  Vitals:    04/04/23 0949 BP: 150/76   Pulse: 78   Temp: 98 °F (36 7 °C)       Ortho Exam    Physical Exam     Right shoulder: Incision CDI  No erythema  Minimal swelling shoulder  Elbow - -45 degrees  Sensation intact right upper extremity  2+ radial pulse  I have personally reviewed pertinent films in PACS and my interpretation is as follows:  Xrays right shoulder: Status post reverse total shoulder arthroplasty  Prosthesis intact and well positioned  No evidence of fracture  This document was created using speech voice recognition software  Grammatical errors, random word insertions, pronoun errors, and incomplete sentences are an occasional consequence of this system due to software limitations, ambient noise, and hardware issues  Any formal questions or concerns about content, text, or information contained within the body of this dictation should be directly addressed to the provider for clarification

## 2023-04-24 ENCOUNTER — OFFICE VISIT (OUTPATIENT)
Dept: PHYSICAL THERAPY | Facility: CLINIC | Age: 78
End: 2023-04-24

## 2023-04-24 DIAGNOSIS — Z96.611 S/P REVERSE TOTAL SHOULDER ARTHROPLASTY, RIGHT: Primary | ICD-10-CM

## 2023-04-24 DIAGNOSIS — M25.511 ACUTE PAIN OF RIGHT SHOULDER: ICD-10-CM

## 2023-04-24 NOTE — PROGRESS NOTES
"Daily Note     Today's date: 2023  Patient name: Disha Rodriguez  : 1945  MRN: [id number]  Referring provider: Nancy Moreira*  Dx:   Encounter Diagnosis     ICD-10-CM    1  S/P reverse total shoulder arthroplasty, right  Z96 611       2  Acute pain of right shoulder  M25 511           Start Time: 1706  Stop Time: 1750  Total time in clinic (min): 44 minutes    Subjective: Client arrives without complaints      Objective: See treatment diary below      Assessment: Tolerated treatment well  Continues to present with muscle guarding throughout movements  Min cues for appropriate UE positioning  She demonstrated fatigue post treatment, exhibited good technique with therapeutic exercises and would benefit from continued PT      Plan: Continue per plan of care  Progress treatment as tolerated         Precautions: Reverse Total Shoulder Protocol-- No GH extension past neutral, NO behind the back movement, Hx of breast cancer  HEP: Access Code [email or web address]  IE: 23     Date 23   Visit Number  4 3 2 1 (IE)   Manuals        PROM Sh Flx/abd/ER  Elbow flx/ext - CY Performed shoulder & elbow all directions Performed shldr and elbow Sh Flx/abd/ER  Elbow flx/ext    AAROM   PT assist 10 x 2   Self in sitting:  Shoulder Flx with scap dep                    Neuro Re-Ed         scap retractions 2x10 2x10 5 sec x 20 20x3\"    No money 2x10, sitting 2x`0 10 x 2  Seated 10x    Ball rolls at table   -- Cw/ccw 20x each    Cane shldr ER AAROM  Sitting 2x10 Sitting 2x10 Sitting: 10 x 2                              Ther Ex        pendulums AP/ML 2x10 ea AP/ML 2x10 ea Performed  All directions 3 min    Table slides 3x10 w/ball flex 3x10 w/ball flex 20 x w/ ball  Ball rolls 20x    Elbow ROM Flexion/ext supine 20 x 2   Sitting: 20 x 2 Flexion/ext supine 3x10, 3s hold Flexion/ext supine 10 x 2   Sitting: 10 x 2 Flx/ext supine & sitting 20x each    Wrist ROM   Sitting: 10 x 2  Supine flx/ext 20 x  " Standing [facility] PROM abd  W/cane 2x10                              Ther Activity                        Gait Training                        Modalities

## 2023-04-26 ENCOUNTER — OFFICE VISIT (OUTPATIENT)
Dept: PHYSICAL THERAPY | Facility: CLINIC | Age: 78
End: 2023-04-26

## 2023-04-26 DIAGNOSIS — Z96.611 S/P REVERSE TOTAL SHOULDER ARTHROPLASTY, RIGHT: Primary | ICD-10-CM

## 2023-04-26 DIAGNOSIS — M25.511 ACUTE PAIN OF RIGHT SHOULDER: ICD-10-CM

## 2023-04-26 NOTE — PROGRESS NOTES
"Daily Note     Today's date: 2023  Patient name: Zach Okeefe  : 1945  MRN: [id number]  Referring provider: Atiya Kim*  Dx:   Encounter Diagnosis     ICD-10-CM    1  S/P reverse total shoulder arthroplasty, right  Z96 611       2  Acute pain of right shoulder  M25 511           Start Time: 1705  Stop Time: 1745  Total time in clinic (min): 40 minutes    Subjective: Pt reports that her shoulder is feeling good today, slightly sore after her last treatment session  Pt states HEP is going well, no pain prior to the start of her treatment session  Objective: See treatment diary below      Assessment: Tolerated treatment well  Patient demonstrated fatigue post treatment, exhibited good technique with therapeutic exercises and would benefit from continued PT      Plan: Continue per plan of care  Progress treatment as tolerated         Precautions: Reverse Total Shoulder Protocol-- No GH extension past neutral, NO behind the back movement, Hx of breast cancer  HEP: Access Code [email or web address]  IE: 23     Date    Visit Number 6 5 4 3 2   Manuals        PROM Shoulder flex/abd/ER Sh Flx/abd/ER  Elbow flx/ext - CY Performed shoulder & elbow all directions Performed shldr and elbow Sh Flx/abd/ER  Elbow flx/ext   AAROM    PT assist 10 x 2   Self in sitting:  Shoulder Flx with scap dep                   Neuro Re-Ed         scap retractions 2x10 2x10 2x10 5 sec x 20 20x3\"   rows RTB 2x10       No money  2x10, sitting 2x`0 10 x 2  Seated 10x   Ball rolls at table    -- Cw/ccw 20x each   Cane shldr ER AAROM   Sitting 2x10 Sitting 2x10 Sitting: 10 x 2     GH ER/IR isos 15x B                       Ther Ex        pendulums AP/ML 3x10 ea AP/ML 2x10 ea AP/ML 2x10 ea Performed  All directions 3 min   Table slides 3x10 w/ball flex 3x10 w/ball flex 3x10 w/ball flex 20 x w/ ball  Ball rolls 20x   Elbow ROM Sitting 20x2s  Supine 20x2s Flexion/ext supine 20 x 2   Sitting: 20 x 2 " Flexion/ext supine 3x10, 3s hold Flexion/ext supine 10 x 2   Sitting: 10 x 2 Flx/ext supine & sitting 20x each   Wrist ROM    Sitting: 10 x 2  Supine flx/ext 20 x   Standing GH PROM abd   W/cane 2x10                             Ther Activity                        Gait Training                        Modalities

## 2023-04-27 ENCOUNTER — OFFICE VISIT (OUTPATIENT)
Dept: PHYSICAL THERAPY | Facility: CLINIC | Age: 78
End: 2023-04-27

## 2023-04-27 DIAGNOSIS — Z96.611 S/P REVERSE TOTAL SHOULDER ARTHROPLASTY, RIGHT: Primary | ICD-10-CM

## 2023-04-27 DIAGNOSIS — M25.511 ACUTE PAIN OF RIGHT SHOULDER: ICD-10-CM

## 2023-04-27 NOTE — PROGRESS NOTES
Daily Note     Today's date: 2023  Patient name: Sanford Escobedo  : 1945  MRN: [id number]  Referring provider: Sidney Mckinnon*  Dx:   Encounter Diagnosis     ICD-10-CM    1  S/P reverse total shoulder arthroplasty, right  Z96 611       2  Acute pain of right shoulder  M25 511           Start Time: 1700  Stop Time: 1745  Total time in clinic (min): 45 minutes    Subjective: Pt reports that she was sore in her shoulder after her last treatment session but she feels good overall  Pt states sleeping good after her previous session because she was tired  Objective: See treatment diary below      Assessment: Tolerated treatment well  Patient demonstrated fatigue post treatment, exhibited good technique with therapeutic exercises and would benefit from continued PT      Plan: Continue per plan of care  Progress treatment as tolerated         Precautions: Reverse Total Shoulder Protocol-- No GH extension past neutral, NO behind the back movement, Hx of breast cancer  HEP: Access Code [email or web address]  IE: 23     Date    Visit Number 7 6 5 4 3   Manuals        PROM Shoulder flex/abd/ER Shoulder flex/abd/ER Sh Flx/abd/ER  Elbow flx/ext - CY Performed shoulder & elbow all directions Performed shldr and elbow   AAROM     PT assist 10 x 2   Self in sitting:                    Neuro Re-Ed         scap retractions  2x10 2x10 2x10 5 sec x 20   rows RTB 2x10 RTB 2x10      No money   2x10, sitting 2x`0 10 x 2    Ball rolls at table     --   [employer] ER AAROM  Sitting 3x10  Sitting 2x10 Sitting 2x10 Sitting: 10 x 2    GH ER/IR isos 2x10 ea + abd+flex 15x B                      Ther Ex        pendulums AP/ML/CW/CCW 3x10 ea AP/ML 3x10 ea AP/ML 2x10 ea AP/ML 2x10 ea Performed    Table slides 2x10 flex w/ball 3x10 w/ball flex 3x10 w/ball flex 3x10 w/ball flex 20 x w/ ball    Elbow ROM  Sitting 20x2s  Supine 20x2s Flexion/ext supine 20 x 2   Sitting: 20 x 2 Flexion/ext supine 3x10, 3s hold Flexion/ext supine 10 x 2   Sitting: 10 x 2   Wrist ROM     Sitting: 10 x 2    Standing GH PROM abd    W/cane 2x10    AAROM supine flex W/cane 2x10                       Ther Activity                        Gait Training                        Modalities

## 2023-05-01 ENCOUNTER — OFFICE VISIT (OUTPATIENT)
Dept: PHYSICAL THERAPY | Facility: CLINIC | Age: 78
End: 2023-05-01

## 2023-05-01 DIAGNOSIS — M25.511 ACUTE PAIN OF RIGHT SHOULDER: ICD-10-CM

## 2023-05-01 DIAGNOSIS — Z96.611 S/P REVERSE TOTAL SHOULDER ARTHROPLASTY, RIGHT: Primary | ICD-10-CM

## 2023-05-01 NOTE — PROGRESS NOTES
Daily Note     Today's date: 2023  Patient name: Cheryl Garza  : 1945  MRN: [id number]  Referring provider: Marilee Rodgers*  Dx:   Encounter Diagnosis     ICD-10-CM    1  S/P reverse total shoulder arthroplasty, right  Z96 611       2  Acute pain of right shoulder  M25 511                      Subjective: Patient reports she feels her shoulder is tight, but it has not been painful the last few days  Objective: See treatment diary below      Assessment: Tolerated treatment well  Patient continues to progress with shoulder flexion and abduction PROM, continued to emphasize isometric form without compensation  Patient demonstrated fatigue post treatment, exhibited good technique with therapeutic exercises and would benefit from continued PT      Plan: Continue per plan of care  Progress treatment as tolerated  Progress challenge as appropriate with irritability       Precautions: Reverse Total Shoulder Protocol-- No GH extension past neutral, NO behind the back movement, Hx of breast cancer  HEP: Access Code [email or web address]  IE: 23     Date    Visit Number 8 7 6 5   Manuals       PROM Shoulder flex/abd/ER Shoulder flex/abd/ER Shoulder flex/abd/ER Sh Flx/abd/ER  Elbow flx/ext - CY   AAROM                     Neuro Re-Ed        scap retractions   2x10 2x10   rows RTB 2x10 RTB 2x10 RTB 2x10    No money    2x10, sitting   Ball rolls at table       [employer] ER AAROM  Sitting 3x10 Sitting 3x10  Sitting 2x10   GH ER/IR isos 2x10 ea + abd + flex 2x10 ea + abd+flex 15x B                  Ther Ex       pendulums AP/ML/CW/CCW 3x10 ea AP/ML/CW/CCW 3x10 ea AP/ML 3x10 ea AP/ML 2x10 ea   Table slides 2x10 w/ ball 2x10 flex w/ball 3x10 w/ball flex 3x10 w/ball flex   Elbow ROM   Sitting 20x2s  Supine 20x2s Flexion/ext supine 20 x 2   Sitting: 20 x 2   Wrist ROM       Standing GH PROM abd       AAROM supine flex w/ Cane 2x10 W/cane 2x10                   Ther Activity Gait Training                     Modalities

## 2023-05-03 ENCOUNTER — OFFICE VISIT (OUTPATIENT)
Dept: PHYSICAL THERAPY | Facility: CLINIC | Age: 78
End: 2023-05-03

## 2023-05-03 DIAGNOSIS — Z96.611 S/P REVERSE TOTAL SHOULDER ARTHROPLASTY, RIGHT: Primary | ICD-10-CM

## 2023-05-03 DIAGNOSIS — M25.511 ACUTE PAIN OF RIGHT SHOULDER: ICD-10-CM

## 2023-05-03 NOTE — PROGRESS NOTES
Daily Note     Today's date: 5/3/2023  Patient name: Elizabeth Lantigua  : 1945  MRN: [id number]  Referring provider: Natalie Alvares*  Dx:   Encounter Diagnosis     ICD-10-CM    1  S/P reverse total shoulder arthroplasty, right  Z96 611       2  Acute pain of right shoulder  M25 511           Start Time: 1702  Stop Time: 1747  Total time in clinic (min): 45 minutes    Subjective: Pt states that she is sore after her last treatment session, feels that her ROM is improving  No new complaints  Objective: See treatment diary below      Assessment: Tolerated treatment well  Patient demonstrated fatigue post treatment, exhibited good technique with therapeutic exercises and would benefit from continued PT      Plan: Continue per plan of care  Progress treatment as tolerated         Precautions: Reverse Total Shoulder Protocol-- No GH extension past neutral, NO behind the back movement, Hx of breast cancer  HEP: Access Code [email or web address]  IE: 23     Date 5/3 5/1 4/27 4/26 4/24   Visit Number 9 8 7 6 5   Manuals        PROM Shoulder abd/ER Shoulder flex/abd/ER Shoulder flex/abd/ER Shoulder flex/abd/ER Sh Flx/abd/ER  Elbow flx/ext - CY   AAROM                        Neuro Re-Ed         scap retractions    2x10 2x10   rows Standing YTB 3x10 RTB 2x10 RTB 2x10 RTB 2x10    No money     2x10, sitting   Ball rolls at table        [employer] ER AAROM  Standing 3x10 Sitting 3x10 Sitting 3x10  Sitting 2x10   GH ER/IR isos 2x10 ea 4 ways 2x10 ea + abd + flex 2x10 ea + abd+flex 15x B    Supine punches To 90 flex 2x10               Ther Ex        pendulums  AP/ML/CW/CCW 3x10 ea AP/ML/CW/CCW 3x10 ea AP/ML 3x10 ea AP/ML 2x10 ea   Table slides 3x10 w/ball 2x10 w/ ball 2x10 flex w/ball 3x10 w/ball flex 3x10 w/ball flex   Elbow ROM    Sitting 20x2s  Supine 20x2s Flexion/ext supine 20 x 2   Sitting: 20 x 2   Wrist ROM        Standing GH PROM abd W/cane 3x10       AAROM supine flex W/cane 2x10 w/ Cane 2x10 W/cane 2x10 Ther Activity                        Gait Training                        Modalities

## 2023-05-04 ENCOUNTER — OFFICE VISIT (OUTPATIENT)
Dept: PHYSICAL THERAPY | Facility: CLINIC | Age: 78
End: 2023-05-04

## 2023-05-04 DIAGNOSIS — Z96.611 S/P REVERSE TOTAL SHOULDER ARTHROPLASTY, RIGHT: Primary | ICD-10-CM

## 2023-05-04 DIAGNOSIS — M25.511 ACUTE PAIN OF RIGHT SHOULDER: ICD-10-CM

## 2023-05-04 NOTE — PROGRESS NOTES
Daily Note     Today's date: 2023  Patient name: Finesse Diop  : 1945  MRN: [id number]  Referring provider: Latisha Franz*  Dx:   Encounter Diagnosis     ICD-10-CM    1  S/P reverse total shoulder arthroplasty, right  Z96 611       2  Acute pain of right shoulder  M25 511           Start Time: 1702  Stop Time: 1745  Total time in clinic (min): 43 minutes    Subjective: Pt reports that her shoulder is sore, performed her exercises 3x prior to the start of her treatment session  No new complaints  Objective: See treatment diary below      Assessment: Tolerated treatment well  Patient demonstrated fatigue post treatment, exhibited good technique with therapeutic exercises and would benefit from continued PT  Introduced more AROM exercises today, needed VC to reduce elbow flexion during standing scaption  Continues to slowly progress PROM each session  Plan: Continue per plan of care  Progress treatment as tolerated         Precautions: Reverse Total Shoulder Protocol-- No GH extension past neutral, NO behind the back movement, Hx of breast cancer  HEP: Access Code [email or web address]  IE: 23     Date 5/4 5/3 5/1 4/27 4/26   Visit Number 10 9 8 7 6   Manuals        PROM Shoulder abd/ER Shoulder abd/ER Shoulder flex/abd/ER Shoulder flex/abd/ER Shoulder flex/abd/ER   AAROM                        Neuro Re-Ed         scap retractions     2x10   rows Standing YTB 2x10, ext YTB 2x10 Standing YTB 3x10 RTB 2x10 RTB 2x10 RTB 2x10   No money Seated no resistance 3x10       Ball rolls at table        [employer] ER AAROM  Standing 3x10 Standing 3x10 Sitting 3x10 Sitting 3x10    GH ER/IR isos 3x10 4 ways 2x10 ea 4 ways 2x10 ea + abd + flex 2x10 ea + abd+flex 15x B   Supine punches To 90 flex 2x10 To 90 flex 2x10              Ther Ex        pendulums   AP/ML/CW/CCW 3x10 ea AP/ML/CW/CCW 3x10 ea AP/ML 3x10 ea   Table slides  3x10 w/ball 2x10 w/ ball 2x10 flex w/ball 3x10 w/ball flex   Elbow ROM Sitting 20x2s  Supine 20x2s   Wrist ROM        Standing GH PROM abd  W/cane 3x10      AAROM supine flex W/cane 3x10 W/cane 2x10 w/ Cane 2x10 W/cane 2x10    Standing AROM flex 2x10  +  2x10 abd w/VC               Ther Activity                        Gait Training                        Modalities

## 2023-05-08 ENCOUNTER — OFFICE VISIT (OUTPATIENT)
Dept: PHYSICAL THERAPY | Facility: CLINIC | Age: 78
End: 2023-05-08

## 2023-05-08 DIAGNOSIS — Z96.611 S/P REVERSE TOTAL SHOULDER ARTHROPLASTY, RIGHT: Primary | ICD-10-CM

## 2023-05-08 DIAGNOSIS — M25.511 ACUTE PAIN OF RIGHT SHOULDER: ICD-10-CM

## 2023-05-08 NOTE — PROGRESS NOTES
Daily Note     Today's date: 2023  Patient name: Hedy Metz  : 1945  MRN: [id number]  Referring provider: Judith Nick*  Dx:   Encounter Diagnosis     ICD-10-CM    1  S/P reverse total shoulder arthroplasty, right  Z96 611       2  Acute pain of right shoulder  M25 511                      Subjective: Hedy Metz states she has minimal pain but continued tightness in shoulder  Objective: See treatment diary below      Assessment: Tolerated treatment well  Patient exhibited good technique with therapeutic exercises, continued limited ROM in all planes  She will continue to benefit from skilled PT to progress allowed per protocol  Plan: Progress treament per protocol        Precautions: Reverse Total Shoulder Protocol-- No GH extension past neutral, NO behind the back movement, Hx of breast cancer  HEP: Access Code [email or web address]  IE: 23     Date 5/8 5/4 5/3 5/1 4/27   Visit Number 11 10 9 8 7   Manuals        PROM Shoulder abd/ER Shoulder abd/ER Shoulder abd/ER Shoulder flex/abd/ER Shoulder flex/abd/ER   AAROM                        Neuro Re-Ed         scap retractions        rows  Standing YTB 2x10, ext YTB 2x10 Standing YTB 3x10 RTB 2x10 RTB 2x10   No money 3x10 Seated no resistance 3x10      Ball rolls at table        [employer] ER AAROM   Standing 3x10 Standing 3x10 Sitting 3x10 Sitting 3x10   GH ER/IR isos  3x10 4 ways 2x10 ea 4 ways 2x10 ea + abd + flex 2x10 ea + abd+flex   Supine punches  To 90 flex 2x10 To 90 flex 2x10     SL gh abd 3x10       Ther Ex        pendulums    AP/ML/CW/CCW 3x10 ea AP/ML/CW/CCW 3x10 ea   Table slides 3x10 w/ball + 4x30s  3x10 w/ball 2x10 w/ ball 2x10 flex w/ball   Elbow ROM        Wrist ROM Cane 3x10       Standing GH PROM abd   W/cane 3x10     AAROM supine flex 3x10 W/cane 3x10 W/cane 2x10 w/ Cane 2x10 W/cane 2x10   Standing AROM flex  2x10  +  2x10 abd w/VC      Sup cane ER 3x10

## 2023-05-10 ENCOUNTER — EVALUATION (OUTPATIENT)
Dept: PHYSICAL THERAPY | Facility: CLINIC | Age: 78
End: 2023-05-10

## 2023-05-10 DIAGNOSIS — Z96.611 S/P REVERSE TOTAL SHOULDER ARTHROPLASTY, RIGHT: Primary | ICD-10-CM

## 2023-05-10 DIAGNOSIS — M25.511 ACUTE PAIN OF RIGHT SHOULDER: ICD-10-CM

## 2023-05-10 NOTE — PROGRESS NOTES
PT Re-Evaluation     Today's date: 5/10/2023  Patient name: Romy Sun  : 1945  MRN: [id number]  Referring provider: Kenneth Evangelista*  Dx:   Encounter Diagnosis     ICD-10-CM    1  S/P reverse total shoulder arthroplasty, right  Z96 611       2  Acute pain of right shoulder  M25 511           Start Time: 1705  Stop Time: 1745  Total time in clinic (min): 40 minutes    Assessment  Assessment details: Pt is an [de-identified] y o female who presents to skilled OPPT for her 12th visit s/p R reverse total shoulder arthroplasty  Pt demonstrated increased R [facility] A/PROM with discomfort in multiples directions, slightly increased R GH MMT in multiple planes, poor posture, decreased TTP globally around the R shoulder, and poor, but improving, body mechanics  Pt is making steady progress since starting PT, however she is very limited with her available ROM and strength which is impacting her day-to-day overall function  Pt's remaining pain and deficits are preventing her from performing self care, ADLs, and recreational activities without compensations  Pt was re-educated on her diagnosis, prognosis, precautions/contraindications, HEP, and POC  Pt would continue to benefit from skilled physical therapy to reduce her pain, address her deficits, progress towards her goals, and return to her PLOF  Impairments: abnormal coordination, abnormal muscle firing, abnormal muscle tone, abnormal or restricted ROM, activity intolerance, impaired physical strength, pain with function, safety issue, scapular dyskinesis, poor posture  and poor body mechanics    Symptom irritability: moderateUnderstanding of Dx/Px/POC: good   Prognosis: fair    Goals  Short Term Goals:  1) Pt will initiate and progress her HEP in 4-6 weeks  - Met  2) Pt will improve GH PROM by 50% with less than 2/10 pain to improve ADLs in 4-6 weeks  - Progressing  3) Pt will begin AROM exercises with less than 3/10 pain to improve ADLs and self care in 4-6 weeks  - "Progressing    Long Term Goals:  1) Pt will be able to sleep throughout the night, 6-8 hours, with less than 2/10 pain in 8-12 weeks  - Progressing  2) Pt will be able to reach a cabinet at shoulder height with less than 2/10 pain in 8-12 weeks  - Progressing  3) Pt will be able to perform ADLs with less than 2/10 pain in 8-12 weeks  - Progressing  4) Pt will be able to shower and clean her home with less than 3/10 pain in 8-12 weeks  - Progressing  5) Pt will be able to work in her garden for 15 mins with less than 2/10 pain in 8-12 weeks  - Not Met    Plan  Patient would benefit from: skilled physical therapy and PT eval  Planned modality interventions: cryotherapy  Planned therapy interventions: activity modification, ADL retraining, joint mobilization, manual therapy, motor coordination training, neuromuscular re-education, body mechanics training, IADL retraining, patient education, postural training, coordination, self care, strengthening, stretching, therapeutic activities, therapeutic exercise, flexibility, functional ROM exercises, graded exercise and home exercise program  Frequency: 2x week  Duration in weeks: 8  Treatment plan discussed with: patient        Subjective Evaluation    History of Present Illness  Date of surgery: 3/2/2023  Mechanism of injury: surgery  Mechanism of injury: 5/10/23 Update:  Pt feels she is progressing \"slowly but surely\"  Pt still has no pain but is restricted with her movement  Getting dressed, showering has gotten easier  Pt still has difficulty lifting her arm up to do her hair, reaching into cabinets  Prepping food, cutting has gotten better, sewing is improving  Pt continues to sleep in the recliner, mostly due to peace of mind not to hurt her shoulder     Pain  Current pain ratin  At best pain ratin  At worst pain ratin (elbow and hands from arthritis)  Quality: dull ache and tight  Aggravating factors: overhead activity and lifting  Progression: " improved    Social Support  Lives with: spouse    Employment status: not working  Hand dominance: right    Patient Goals  Patient goals for therapy: increased motion, decreased edema, increased strength, independence with ADLs/IADLs and return to sport/leisure activities  Patient goal: Pt wants to get back to gardening        Objective     Static Posture     Head  Forward  Shoulders  Asymmetric shoulders, elevated and rounded  Tenderness     Right Shoulder  No tenderness in the [facility] joint, biceps tendon (proximal) and bicipital groove       Active Range of Motion   Left Shoulder   Flexion: 142 degrees   Abduction: 135 degrees   External rotation BTH: T3     Right Shoulder   Flexion: 88 degrees   Abduction: 75 degrees   External rotation BTH: C4     Right Elbow   Flexion: 145 degrees   Extension: 10 degrees   Forearm supination: 90 degrees   Forearm pronation: 90 degrees     Right Wrist   Wrist flexion: 40 degrees   Wrist extension: 70 degrees   Radial deviation: 22 degrees   Ulnar deviation: 10 degrees     Passive Range of Motion     Right Shoulder   Flexion: 105 degrees   Abduction: 85 degrees   External rotation 0°: 40 degrees   External rotation 45°: 25 degrees   Internal rotation 0°: 50 degrees   Internal rotation 45°: 50 degrees     Right Elbow   Flexion: 147 degrees   Extension: 8 degrees   Forearm supination: 90 degrees   Forearm pronation: 95 degrees     Strength/Myotome Testing     Right Shoulder     Planes of Motion   Flexion: 3-   Abduction: 2+   External rotation at 0°: 3-   Internal rotation at 0°: 3+     Left Elbow   Flexion: 4+  Extension: 4+  Forearm supination: 4+  Forearm pronation: 4+    Right Elbow   Flexion: 4-  Extension: 4-  Forearm supination: 4-  Forearm pronation: 4-                Precautions: Reverse Total Shoulder Protocol-- No GH extension past neutral, NO behind the back movement, Hx of breast cancer  HEP: Access Code [email or web address]  IE: 4/12/23   PN: 5/10/23  Date 5/10 5/8 5/4 5/3 5/1 Visit Number 12 11 10 9 8   Manuals        PROM  Shoulder abd/ER Shoulder abd/ER Shoulder abd/ER Shoulder flex/abd/ER   AAROM                        Neuro Re-Ed         scap retractions        rows   Standing YTB 2x10, ext YTB 2x10 Standing YTB 3x10 RTB 2x10   No money  3x10 Seated no resistance 3x10     Ball rolls at table        [employer] ER AAROM    Standing 3x10 Standing 3x10 Sitting 3x10   GH ER/IR isos   3x10 4 ways 2x10 ea 4 ways 2x10 ea + abd + flex   Supine punches   To 90 flex 2x10 To 90 flex 2x10    SL gh abd  3x10      Ther Ex        pendulums     AP/ML/CW/CCW 3x10 ea   Table slides  3x10 w/ball + 4x30s  3x10 w/ball 2x10 w/ ball   Elbow ROM        Wrist ROM  Cane 3x10      Standing GH PROM abd    W/cane 3x10    AAROM supine flex 2x10 3x10 W/cane 3x10 W/cane 2x10 w/ Cane 2x10   Standing AROM flex   2x10  +  2x10 abd w/VC     Sup cane ER 3x10 3x10

## 2023-05-11 ENCOUNTER — OFFICE VISIT (OUTPATIENT)
Dept: PHYSICAL THERAPY | Facility: CLINIC | Age: 78
End: 2023-05-11

## 2023-05-11 DIAGNOSIS — M25.511 ACUTE PAIN OF RIGHT SHOULDER: ICD-10-CM

## 2023-05-11 DIAGNOSIS — Z96.611 S/P REVERSE TOTAL SHOULDER ARTHROPLASTY, RIGHT: Primary | ICD-10-CM

## 2023-05-11 NOTE — PROGRESS NOTES
"Daily Note     Today's date: 2023  Patient name: Padmini Berkowitz  : 1945  MRN: [id number]  Referring provider: Horace Castillo*  Dx:   Encounter Diagnosis     ICD-10-CM    1  S/P reverse total shoulder arthroplasty, right  Z96 611       2  Acute pain of right shoulder  M25 511           Start Time: 1705  Stop Time: 1745  Total time in clinic (min): 40 minutes    Subjective: Pt states that she is sore after yesterday's re-evaluation, but overall feels \"okay\"  No new complaints, continues to be compliant with her HEP  No new complaints  Objective: See treatment diary below      Assessment: Tolerated treatment well  Patient demonstrated fatigue post treatment, exhibited good technique with therapeutic exercises and would benefit from continued PT  Light AROM progressions given as per surgical protocols  Pt tolerates exercises well, however requires mild VC and TC to avoid trunk and elbow compensations  Plan: Continue per plan of care  Progress treatment as tolerated           Precautions: Reverse Total Shoulder Protocol-- No GH extension past neutral, NO behind the back movement, Hx of breast cancer  HEP: Access Code [email or web address]  IE: 23   PN: 5/10/23  Date 5/11 5/10 5/8 5/4 5/3   Visit Number 13 12 11 10 9   Manuals        PROM Shoulder flex/abd/ER/IR  Shoulder abd/ER Shoulder abd/ER Shoulder abd/ER   AAROM                        Neuro Re-Ed         scap retractions        rows Standing YTB 2x10 + extensions   Standing YTB 2x10, ext YTB 2x10 Standing YTB 3x10   No money   3x10 Seated no resistance 3x10    Ball rolls at table        [employer] ER AAROM     Standing 3x10 Standing 3x10   GH ER/IR isos    3x10 4 ways 2x10 ea 4 ways   Supine GH ABC 2x       Supine punches    To 90 flex 2x10 To 90 flex 2x10   SL GH ER 3x10 w/TC       SL gh abd 3x10 w/TC    SL flex 3x10 w/TC  3x10     Ther Ex        pendulums        Table slides   3x10 w/ball + 4x30s  3x10 w/ball   Elbow ROM        Wrist ROM " Cane 3x10     Standing GH PROM abd     W/cane 3x10   AAROM supine flex 3x10 2x10 3x10 W/cane 3x10 W/cane 2x10   Standing AROM flex    2x10  +  2x10 abd w/VC    Sup cane ER 3x10 3x10 3x10

## 2023-05-17 ENCOUNTER — OFFICE VISIT (OUTPATIENT)
Dept: PHYSICAL THERAPY | Facility: CLINIC | Age: 78
End: 2023-05-17

## 2023-05-17 DIAGNOSIS — M25.511 ACUTE PAIN OF RIGHT SHOULDER: ICD-10-CM

## 2023-05-17 DIAGNOSIS — Z96.611 S/P REVERSE TOTAL SHOULDER ARTHROPLASTY, RIGHT: Primary | ICD-10-CM

## 2023-05-17 NOTE — PROGRESS NOTES
Daily Note         Today's date: 2023  Patient name: Jeni Renteria  : 1945  MRN: [id number]  Referring provider: Mejia Randall  Dx:   Encounter Diagnosis     ICD-10-CM    1  S/P reverse total shoulder arthroplasty, right  Z96 611       2  Acute pain of right shoulder  M25 511           Subjective: Jeni Renteria reports no new complaints  Objective: See treatment diary below    Assessment:  patient has excellent tolerance to program  needs only minimal cuing for activities  patient was able to progress alphabet to perform in standing writing letters on the wall  Only minimal upper trap compensation noted with elevation against gravity  Otherwise excellent form  The goal of the current treatment is to address patient's functional limitations as well as objective findings  Plan: progress as indicated by primary PT          Precautions: Reverse Total Shoulder Protocol-- No GH extension past neutral, NO behind the back movement, Hx of breast cancer  HEP: Access Code [email or web address]  IE: 23   PN: 5/10/23  Date 5/17/23 5/11 5/10 5/8 5/4 5/3   Visit Number 14 13 12 11 10 9   Manuals         PROM ++ Shoulder flex/abd/ER/IR  Shoulder abd/ER Shoulder abd/ER Shoulder abd/ER   AAROM                           Neuro Re-Ed          scap retractions         rows Yellow: 2 x 10    + extension: yellow: 2 x 10   Standing YTB 2x10 + extensions   Standing YTB 2x10, ext YTB 2x10 Standing YTB 3x10   No money Seated no resistance: 10 x 3    3x10 Seated no resistance 3x10    Ball rolls at table         [employer] ER AAROM  standing: 10 x 3     Standing 3x10 Standing 3x10   GH ER/IR isos --    3x10 4 ways 2x10 ea 4 ways   Supine GH ABC Standin x 2x       Supine punches     To 90 flex 2x10 To 90 flex 2x10   SL GH ER 10 x 3  3x10 w/TC       SL gh abd 10 x 3 3x10 w/TC    SL flex 3x10 w/TC  3x10     Ther Ex         pendulums         Table slides W/ ball: 30x   3x10 w/ball + 4x30s  3x10 w/ball Elbow ROM         Wrist ROM    Cane 3x10     Standing GH PROM abd      W/cane 3x10   AAROM supine flex 10 x 3 with cane 3x10 2x10 3x10 W/cane 3x10 W/cane 2x10   Standing AROM flex     2x10  +  2x10 abd w/VC    Sup cane ER 10 x 3 3x10 3x10 3x10

## 2023-05-18 ENCOUNTER — OFFICE VISIT (OUTPATIENT)
Dept: PHYSICAL THERAPY | Facility: CLINIC | Age: 78
End: 2023-05-18

## 2023-05-18 DIAGNOSIS — Z96.611 S/P REVERSE TOTAL SHOULDER ARTHROPLASTY, RIGHT: Primary | ICD-10-CM

## 2023-05-18 DIAGNOSIS — E03.9 ACQUIRED HYPOTHYROIDISM: Primary | ICD-10-CM

## 2023-05-18 DIAGNOSIS — Z13.220 SCREENING FOR LIPID DISORDERS: ICD-10-CM

## 2023-05-18 DIAGNOSIS — I10 ESSENTIAL HYPERTENSION: ICD-10-CM

## 2023-05-18 DIAGNOSIS — I10 PRIMARY HYPERTENSION: ICD-10-CM

## 2023-05-18 DIAGNOSIS — M25.511 ACUTE PAIN OF RIGHT SHOULDER: ICD-10-CM

## 2023-05-18 DIAGNOSIS — Z00.00 MEDICARE ANNUAL WELLNESS VISIT, SUBSEQUENT: ICD-10-CM

## 2023-05-18 DIAGNOSIS — D64.9 ANEMIA, UNSPECIFIED TYPE: ICD-10-CM

## 2023-05-18 NOTE — PROGRESS NOTES
Daily Note         Today's date: 2023  Patient name: Mick Uriostegui  : 1945  MRN: [id number]  Referring provider: Alfie Amaya*  Dx:   Encounter Diagnosis     ICD-10-CM    1  S/P reverse total shoulder arthroplasty, right  Z96 611       2  Acute pain of right shoulder  M25 511           Subjective: Mick Uriostegui reports shoulder is feeling fine, reports she is not sleeping in bed yet, however is doing most of her grooming independently  Pt reports she needs help with cooking still  Objective: See treatment diary below    Assessment:  Pt requires intermittent cues to avoid scapular hiking, no c/o pain throughout therapy session  Plan: Continue per plan of care  Progress treatment per protocol           Precautions: Reverse Total Shoulder Protocol-- No GH extension past neutral, NO behind the back movement, Hx of breast cancer  HEP: Access Code [email or web address]  IE: 23   PN: 5/10/23  Date 5/18 5/17/23 5/11 5/10 5/8 5/4   Visit Number 15 14 13 12 11 10   Manuals         PROM Sh flx/abd/ER ++ Shoulder flex/abd/ER/IR  Shoulder abd/ER Shoulder abd/ER   AAROM         STM R shoulder girdle                 Neuro Re-Ed          scap retractions         rows 3x10 YTB ext to neutral Yellow: 2 x 10    + extension: yellow: 2 x 10   Standing YTB 2x10 + extensions   Standing YTB 2x10, ext YTB 2x10   No money 3x10 Seated no resistance: 10 x 3    3x10 Seated no resistance 3x10   Ball rolls at table         [employer] ER AAROM  3x10 supine & stand standing: 10 x 3     Standing 3x10   GH ER/IR isos  --    3x10 4 ways   Supine GH ABC  Standin x 2x      Supine punches      To 90 flex 2x10   SL GH ER 10 x3  10 x 3  3x10 w/TC      SL gh abd 10 x3  10 x 3 3x10 w/TC    SL flex 3x10 w/TC  3x10    Ther Ex         pendulums         Table slides  W/ ball: 30x   3x10 w/ball + 4x30s    Elbow ROM         Wrist ROM     Cane 3x10    Standing GH PROM abd         AAROM supine flex 10 x 3 with cane & abd 10 x 3 with cane 3x10 2x10 3x10 W/cane 3x10   Standing AROM flex 2x10     2x10  +  2x10 abd w/VC   Sup cane ER  10 x 3 3x10 3x10 3x10

## 2023-05-24 ENCOUNTER — OFFICE VISIT (OUTPATIENT)
Dept: PHYSICAL THERAPY | Facility: CLINIC | Age: 78
End: 2023-05-24

## 2023-05-24 DIAGNOSIS — M25.511 ACUTE PAIN OF RIGHT SHOULDER: ICD-10-CM

## 2023-05-24 DIAGNOSIS — Z96.611 S/P REVERSE TOTAL SHOULDER ARTHROPLASTY, RIGHT: Primary | ICD-10-CM

## 2023-05-24 NOTE — PROGRESS NOTES
Daily Note         Today's date: 2023  Patient name: Natalie Shin  : 1945  MRN: [id number]  Referring provider: Danelle Benoit*  Dx:   Encounter Diagnosis     ICD-10-CM    1  S/P reverse total shoulder arthroplasty, right  Z96 611       2  Acute pain of right shoulder  M25 511           Subjective: Natalie Shin reports pain level entering today is 0/10  Objective: See treatment diary below    Assessment:  patient had excellent toleranc eto progam this session  Patient fatigues quickly with performing the alphabet on the wall  All precautions were respected during session  Patient is becoming more competent in program so less cuing is needed  Plan: continue as indicated by primary PT            Precautions: Reverse Total Shoulder Protocol-- No GH extension past neutral, NO behind the back movement, Hx of breast cancer  HEP: Access Code [email or web address]  IE: 23   PN: 5/10/23  Date 5/24/23 5/18 5/17/23 5/11 5/10 5/8   Visit Number 16 15 14 13 12 11   Manuals         PROM Performed flex, abd, ER Sh flx/abd/ER ++ Shoulder flex/abd/ER/IR  Shoulder abd/ER   AAROM         STM  R shoulder girdle                Neuro Re-Ed          scap retractions         rows Rows: Yellow 10 x 3  extension: yellow: 3 x 10   3x10 YTB ext to neutral Yellow: 2 x 10    + extension: yellow: 2 x 10   Standing YTB 2x10 + extensions     No money 10 x 3  3x10 Seated no resistance: 10 x 3    3x10   Ball rolls at table         [employer] ER AAROM  Stand:10 x 3   3x10 supine & stand standing: 10 x 3       GH ER/IR isos   --      Supine GH ABC Standin x   Standin x 2x     Supine punches         SL GH ER SL: 10 x 3 10 x3  10 x 3  3x10 w/TC     SL gh abd SL: 10 x 3  10 x3  10 x 3 3x10 w/TC    SL flex 3x10 w/TC  3x10   Ther Ex         pendulums         Table slides W/ball: 30 x   W/ ball: 30x W/ ball: 30 x   3x10 w/ball + 4x30s   Elbow ROM         Wrist ROM      Cane 3x10   Standing GH PROM abd AAROM supine flex Flexion: supine 10 x 3 with cane  Abd: 10 x  3 10 x 3 with cane & abd 10 x 3 with cane 3x10 2x10 3x10   Standing AROM flex 10 x 2  2x10       Sup cane ER 10 x 3 with cane  10 x 3 3x10 3x10 3x10

## 2023-05-25 ENCOUNTER — OFFICE VISIT (OUTPATIENT)
Dept: PHYSICAL THERAPY | Facility: CLINIC | Age: 78
End: 2023-05-25

## 2023-05-25 DIAGNOSIS — Z96.611 S/P REVERSE TOTAL SHOULDER ARTHROPLASTY, RIGHT: Primary | ICD-10-CM

## 2023-05-25 DIAGNOSIS — M25.511 ACUTE PAIN OF RIGHT SHOULDER: ICD-10-CM

## 2023-05-25 NOTE — PROGRESS NOTES
Daily Note     Today's date: 2023  Patient name: Leydi Ernst  : 1945  MRN: [id number]  Referring provider: Dinah Springer*  Dx:   Encounter Diagnosis     ICD-10-CM    1  S/P reverse total shoulder arthroplasty, right  Z96 611       2  Acute pain of right shoulder  M25 511           Start Time: 1705  Stop Time: 1745  Total time in clinic (min): 40 minutes    Subjective: Pt states that she had a tough time sleeping last night, but it wasn't with her shoulder  Pt denies any pain in her shoulder prior to the start of her treatment session  Objective: See treatment diary below      Assessment: Tolerated treatment well  Patient demonstrated fatigue post treatment, exhibited good technique with therapeutic exercises and would benefit from continued PT  Pt required consistent VC and TC for ER & IR walkouts due to weakness and difficulty coordinating movements  Pt continues to show deficits with A/PROM in all directions, but most difficulty with ER AROM  Minimal gains noted with ER at this point  Plan: Continue per plan of care  Progress treatment as tolerated         Precautions: Reverse Total Shoulder Protocol-- No GH extension past neutral, NO behind the back movement, Hx of breast cancer  HEP: Access Code [email or web address]  IE: 23   PN: 5/10/23  Date 5/25 5/24/23 5/18 5/17/23 5/11 5/10   Visit Number 17 16 15 14 13 12   Manuals         PROM  Performed flex, abd, ER Sh flx/abd/ER ++ Shoulder flex/abd/ER/IR    AAROM         STM   R shoulder girdle               Neuro Re-Ed          scap retractions         rows  Rows: Yellow 10 x 3  extension: yellow: 3 x 10   3x10 YTB ext to neutral Yellow: 2 x 10    + extension: yellow: 2 x 10   Standing YTB 2x10 + extensions    No money 3x10 single arm 10 x 3  3x10 Seated no resistance: 10 x 3      Ball rolls at table         [employer] ER AAROM   Stand:10 x 3   3x10 supine & stand standing: 10 x 3      GH ER/IR isos ER/IR walkouts green 10x IR, yellow 10x ER   --     Supine GH ABC Standing 2x Standin x   Standin x 2x    Supine punches         SL GH ER  SL: 10 x 3 10 x3  10 x 3  3x10 w/TC    SL gh abd SL 3x10 SL: 10 x 3  10 x3  10 x 3 3x10 w/TC    SL flex 3x10 w/TC    Ther Ex         pendulums         Table slides W/ball flex & abd 30x W/ball: 30 x   W/ ball: 30x W/ ball: 30 x     Elbow ROM         Wrist ROM         Standing GH PROM abd         AAROM supine flex Flexion supine 3x10 w/cane Flexion: supine 10 x 3 with cane  Abd: 10 x  3 10 x 3 with cane & abd 10 x 3 with cane 3x10 2x10   Standing AROM flex W/1lb 2x10    scaption w/1lb 2x10 10 x 2  2x10      Sup cane ER  10 x 3 with cane  10 x 3 3x10 3x10

## 2023-05-31 ENCOUNTER — OFFICE VISIT (OUTPATIENT)
Dept: PHYSICAL THERAPY | Facility: CLINIC | Age: 78
End: 2023-05-31

## 2023-05-31 DIAGNOSIS — Z96.611 S/P REVERSE TOTAL SHOULDER ARTHROPLASTY, RIGHT: Primary | ICD-10-CM

## 2023-05-31 DIAGNOSIS — M25.511 ACUTE PAIN OF RIGHT SHOULDER: ICD-10-CM

## 2023-05-31 LAB
ALBUMIN SERPL-MCNC: 4.5 G/DL (ref 3.6–5.1)
ALBUMIN/GLOB SERPL: 1.5 (CALC) (ref 1–2.5)
ALP SERPL-CCNC: 85 U/L (ref 37–153)
ALT SERPL-CCNC: 14 U/L (ref 6–29)
AST SERPL-CCNC: 21 U/L (ref 10–35)
BASOPHILS # BLD AUTO: 40 CELLS/UL (ref 0–200)
BASOPHILS NFR BLD AUTO: 0.7 %
BILIRUB SERPL-MCNC: 0.8 MG/DL (ref 0.2–1.2)
BUN SERPL-MCNC: 11 MG/DL (ref 7–25)
BUN/CREAT SERPL: 23 (CALC) (ref 6–22)
CALCIUM SERPL-MCNC: 10 MG/DL (ref 8.6–10.4)
CHLORIDE SERPL-SCNC: 102 MMOL/L (ref 98–110)
CHOLEST SERPL-MCNC: 200 MG/DL
CHOLEST/HDLC SERPL: 3.2 (CALC)
CO2 SERPL-SCNC: 29 MMOL/L (ref 20–32)
CREAT SERPL-MCNC: 0.47 MG/DL (ref 0.6–0.95)
EOSINOPHIL # BLD AUTO: 57 CELLS/UL (ref 15–500)
EOSINOPHIL NFR BLD AUTO: 1 %
ERYTHROCYTE [DISTWIDTH] IN BLOOD BY AUTOMATED COUNT: 12.8 % (ref 11–15)
GFR/BSA.PRED SERPLBLD CYS-BASED-ARV: 96 ML/MIN/1.73M2
GLOBULIN SER CALC-MCNC: 3.1 G/DL (CALC) (ref 1.9–3.7)
GLUCOSE SERPL-MCNC: 83 MG/DL (ref 65–99)
HCT VFR BLD AUTO: 38.7 % (ref 35–45)
HDLC SERPL-MCNC: 63 MG/DL
HGB BLD-MCNC: 13.3 G/DL (ref 11.7–15.5)
LDLC SERPL CALC-MCNC: 117 MG/DL (CALC)
LYMPHOCYTES # BLD AUTO: 2024 CELLS/UL (ref 850–3900)
LYMPHOCYTES NFR BLD AUTO: 35.5 %
MCH RBC QN AUTO: 30.9 PG (ref 27–33)
MCHC RBC AUTO-ENTMCNC: 34.4 G/DL (ref 32–36)
MCV RBC AUTO: 89.8 FL (ref 80–100)
MONOCYTES # BLD AUTO: 428 CELLS/UL (ref 200–950)
MONOCYTES NFR BLD AUTO: 7.5 %
NEUTROPHILS # BLD AUTO: 3152 CELLS/UL (ref 1500–7800)
NEUTROPHILS NFR BLD AUTO: 55.3 %
NONHDLC SERPL-MCNC: 137 MG/DL (CALC)
PLATELET # BLD AUTO: 238 THOUSAND/UL (ref 140–400)
PMV BLD REES-ECKER: 10.6 FL (ref 7.5–12.5)
POTASSIUM SERPL-SCNC: 4 MMOL/L (ref 3.5–5.3)
PROT SERPL-MCNC: 7.6 G/DL (ref 6.1–8.1)
RBC # BLD AUTO: 4.31 MILLION/UL (ref 3.8–5.1)
SODIUM SERPL-SCNC: 140 MMOL/L (ref 135–146)
TRIGL SERPL-MCNC: 101 MG/DL
TSH SERPL-ACNC: 0.79 MIU/L (ref 0.4–4.5)
WBC # BLD AUTO: 5.7 THOUSAND/UL (ref 3.8–10.8)

## 2023-05-31 NOTE — PROGRESS NOTES
Daily Note     Today's date: 2023  Patient name: Larissa Rutherford  : 1945  MRN: [id number]  Referring provider: Reynold Wellington  Dx:   Encounter Diagnosis     ICD-10-CM    1  S/P reverse total shoulder arthroplasty, right  Z96 611       2  Acute pain of right shoulder  M25 511           Start Time: 1703  Stop Time: 1747  Total time in clinic (min): 44 minutes    Subjective: Client denies any complaints      Objective: See treatment diary below      Assessment: Tolerated treatment well  She demonstrated fatigue post treatment, exhibited good technique with therapeutic exercises and would benefit from continued PT  She continues to require frequent VC and TC for ER & IR walkouts due to weakness and difficulty coordinating movements  She is limited in A/PROM in all directions, but most difficulty with ER AROM  Plan: Continue per plan of care  Progress treatment as tolerated         Precautions: Reverse Total Shoulder Protocol-- No GH extension past neutral, NO behind the back movement, Hx of breast cancer  HEP: Access Code [email or web address]  IE: 23   PN: 5/10/23  Date 5/31 5/25 5/24/23 5/18 5/17/23 5/11 5/10   Visit Number 18 17 16 15 14 13 12   Manuals          PROM Performed flex, abd, ER  Performed flex, abd, ER Sh flx/abd/ER ++ Shoulder flex/abd/ER/IR    AAROM          STM    R shoulder girdle                Neuro Re-Ed           scap retractions          rows Rows: Yellow 10 x 3  extension: yellow: 3 x 10    Rows: Yellow 10 x 3  extension: yellow: 3 x 10   3x10 YTB ext to neutral Yellow: 2 x 10    + extension: yellow: 2 x 10   Standing YTB 2x10 + extensions    No money 3x10 single arm 3x10 single arm 10 x 3  3x10 Seated no resistance: 10 x 3      Ball rolls at table          [employer] ER AAROM    Stand:10 x 3   3x10 supine & stand standing: 10 x 3      GH ER/IR isos ER/IR walkouts green 10x IR, yellow 10x ER ER/IR walkouts green 10x IR, yellow 10x ER   --     Supine GH ABC  Standing 2x Standin x   Standin x 2x    Supine punches          SL GH ER   SL: 10 x 3 10 x3  10 x 3  3x10 w/TC    SL gh abd  SL 3x10 SL: 10 x 3  10 x3  10 x 3 3x10 w/TC    SL flex 3x10 w/TC    Ther Ex          pendulums          Table slides W/ball flex & abd 30x W/ball flex & abd 30x W/ball: 30 x   W/ ball: 30x W/ ball: 30 x     Elbow ROM          Wrist ROM          Standing GH PROM abd          AAROM supine flex Flexion supine 3x10 w/cane Flexion supine 3x10 w/cane Flexion: supine 10 x 3 with cane  Abd: 10 x  3 10 x 3 with cane & abd 10 x 3 with cane 3x10 2x10   Standing AROM flex W/1lb 2x10    scaption w/1lb 2x10 W/1lb 2x10    scaption w/1lb 2x10 10 x 2  2x10      Sup cane ER 3x10 with cane  10 x 3 with cane  10 x 3 3x10 3x10

## 2023-06-01 ENCOUNTER — OFFICE VISIT (OUTPATIENT)
Dept: PHYSICAL THERAPY | Facility: CLINIC | Age: 78
End: 2023-06-01

## 2023-06-01 DIAGNOSIS — M25.511 ACUTE PAIN OF RIGHT SHOULDER: ICD-10-CM

## 2023-06-01 DIAGNOSIS — Z96.611 S/P REVERSE TOTAL SHOULDER ARTHROPLASTY, RIGHT: Primary | ICD-10-CM

## 2023-06-01 NOTE — PROGRESS NOTES
Daily Note     Today's date: 2023  Patient name: Jarrett Menezes  : 1945  MRN: [id number]  Referring provider: Kent Essex*  Dx:   Encounter Diagnosis     ICD-10-CM    1  S/P reverse total shoulder arthroplasty, right  Z96 611       2  Acute pain of right shoulder  M25 511           Start Time: 1700  Stop Time: 1745  Total time in clinic (min): 45 minutes    Subjective: Pt states that her shoulder feels good today, she was a little sore after her session yesterday  No new complaints  Objective: See treatment diary below      Assessment: Tolerated treatment well  Patient demonstrated fatigue post treatment, exhibited good technique with therapeutic exercises and would benefit from continued PT  Pt demonstrated improved shoulder AROM into flexion and abduction, as evidenced by progressed AROM exercises  Pt continues to have higher degrees of tightness and difficulty with ER  Continues to need VC and TC for ER and IR  Plan: Continue per plan of care  Progress treatment as tolerated         Precautions: Reverse Total Shoulder Protocol-- No GH extension past neutral, NO behind the back movement, Hx of breast cancer  HEP: Access Code [email or web address]  KX MODIFIER  IE: 23   PN: 5/10/23  Date 23   Visit Number 19 18 17 16 15 14   Manuals         PROM  Performed flex, abd, ER  Performed flex, abd, ER Sh flx/abd/ER ++   AAROM         STM     R shoulder girdle             Neuro Re-Ed          scap retractions         rows Single arm emily 4 0 3x10    Extension emily 1 0 3x10 Rows: Yellow 10 x 3  extension: yellow: 3 x 10    Rows: Yellow 10 x 3  extension: yellow: 3 x 10   3x10 YTB ext to neutral Yellow: 2 x 10    + extension: yellow: 2 x 10     No money  3x10 single arm 3x10 single arm 10 x 3  3x10 Seated no resistance: 10 x 3    Ball rolls at table         [employer] ER AAROM  Seated 2x10   Stand:10 x 3   3x10 supine & stand standing: 10 x 3    GH ER/IR isos ER/IR walkouts green 10x IR, yellow 10x ER ER/IR walkouts green 10x IR, yellow 10x ER   --   Supine GH ABC   Standing 2x Standin x   Standin x   Supine punches         SL GH ER SL 3x10   SL: 10 x 3 10 x3  10 x 3    SL gh abd SL 3x10  SL 3x10 SL: 10 x 3  10 x3  10 x 3   Ther Ex         pendulums         Table slides  W/ball flex & abd 30x W/ball flex & abd 30x W/ball: 30 x   W/ ball: 30x   Elbow ROM         Wrist ROM         Standing GH PROM abd         AAROM supine flex Supine AROM flex no cane 2x10 Flexion supine 3x10 w/cane Flexion supine 3x10 w/cane Flexion: supine 10 x 3 with cane  Abd: 10 x  3 10 x 3 with cane & abd 10 x 3 with cane   Standing AROM flex  W/1lb 2x10    scaption w/1lb 2x10 W/1lb 2x10    scaption w/1lb 2x10 10 x 2  2x10    Sup cane ER  3x10 with cane  10 x 3 with cane  10 x 3   Wall slides Flex 2x10    2x10 flex w/GH horiz abd & add to tension        Standing flexion into horiz abd and abd into horiz add 2x10 ea

## 2023-06-07 ENCOUNTER — OFFICE VISIT (OUTPATIENT)
Dept: PHYSICAL THERAPY | Facility: CLINIC | Age: 78
End: 2023-06-07
Payer: MEDICARE

## 2023-06-07 DIAGNOSIS — M25.511 ACUTE PAIN OF RIGHT SHOULDER: ICD-10-CM

## 2023-06-07 DIAGNOSIS — Z96.611 S/P REVERSE TOTAL SHOULDER ARTHROPLASTY, RIGHT: Primary | ICD-10-CM

## 2023-06-07 PROCEDURE — 97112 NEUROMUSCULAR REEDUCATION: CPT | Performed by: PHYSICAL THERAPIST

## 2023-06-07 PROCEDURE — 97110 THERAPEUTIC EXERCISES: CPT | Performed by: PHYSICAL THERAPIST

## 2023-06-07 NOTE — PROGRESS NOTES
Daily Note     Today's date: 2023  Patient name: Yamilex Ireland  : 1945  MRN: [id number]  Referring provider: Venkat Higginbotham*  Dx:   Encounter Diagnosis     ICD-10-CM    1  S/P reverse total shoulder arthroplasty, right  Z96 611       2  Acute pain of right shoulder  M25 511           Start Time: 1706  Stop Time: 1750  Total time in clinic (min): 44 minutes    Subjective: Client reports her shoulder is feeling good  Objective: See treatment diary below      Assessment: Tolerated treatment well  She demonstrated fatigue post treatment, exhibited good technique with therapeutic exercises and would benefit from continued PT  Minimal cues to maximize form  She continues to be limited most in ER  Continues to need VC and TC for ER and IR  Plan: Continue per plan of care  Progress treatment as tolerated  Progress Note next session       Precautions: Reverse Total Shoulder Protocol-- No GH extension past neutral, NO behind the back movement, Hx of breast cancer  HEP: Access Code [email or web address]  KX MODIFIER  IE: 23   PN: 5/10/23  Date 23   Visit Number 18 19 18 17 16 15 14   Manuals          PROM Performed flex, abd, ER  Performed flex, abd, ER  Performed flex, abd, ER Sh flx/abd/ER ++   AAROM          STM      R shoulder girdle              Neuro Re-Ed           scap retractions          rows Single arm emily 4 0 3x10    Extension emily 1 0 3x10 Single arm emily 4 0 3x10    Extension emily 1 0 3x10 Rows: Yellow 10 x 3  extension: yellow: 3 x 10    Rows: Yellow 10 x 3  extension: yellow: 3 x 10   3x10 YTB ext to neutral Yellow: 2 x 10    + extension: yellow: 2 x 10     No money   3x10 single arm 3x10 single arm 10 x 3  3x10 Seated no resistance: 10 x 3    Ball rolls at table          [employer] ER AAROM   Seated 2x10   Stand:10 x 3   3x10 supine & stand standing: 10 x 3    GH ER/IR isos   ER/IR walkouts green 10x IR, yellow 10x ER ER/IR walkouts green 10x IR, yellow 10x ER   --   Supine GH ABC    Standing 2x Standin x   Standin x   Supine punches          SL GH ER SL 3x10 SL 3x10   SL: 10 x 3 10 x3  10 x 3    SL gh abd SL 3x10 SL 3x10  SL 3x10 SL: 10 x 3  10 x3  10 x 3   Ther Ex          pendulums          Table slides W/ball flex & ABD 30x  W/ball flex & abd 30x W/ball flex & abd 30x W/ball: 30 x   W/ ball: 30x   Elbow ROM          Wrist ROM          Standing GH PROM abd          AAROM supine flex Flexion supine 3x10 w/cane Supine AROM flex no cane 2x10 Flexion supine 3x10 w/cane Flexion supine 3x10 w/cane Flexion: supine 10 x 3 with cane  Abd: 10 x  3 10 x 3 with cane & abd 10 x 3 with cane   Standing AROM flex W/1lb 2x10    scaption w/1lb 2x10  W/1lb 2x10    scaption w/1lb 2x10 W/1lb 2x10    scaption w/1lb 2x10 10 x 2  2x10    Sup cane ER   3x10 with cane  10 x 3 with cane  10 x 3   Wall slides Flex 3x10 Flex 2x10    2x10 flex w/GH horiz abd & add to tension        Standing flexion into horiz abd and abd into horiz add  2x10 ea

## 2023-06-08 ENCOUNTER — EVALUATION (OUTPATIENT)
Dept: PHYSICAL THERAPY | Facility: CLINIC | Age: 78
End: 2023-06-08
Payer: MEDICARE

## 2023-06-08 DIAGNOSIS — M25.511 ACUTE PAIN OF RIGHT SHOULDER: ICD-10-CM

## 2023-06-08 DIAGNOSIS — Z96.611 S/P REVERSE TOTAL SHOULDER ARTHROPLASTY, RIGHT: Primary | ICD-10-CM

## 2023-06-08 PROCEDURE — 97110 THERAPEUTIC EXERCISES: CPT

## 2023-06-08 NOTE — PROGRESS NOTES
PT Re-Evaluation     Today's date: 2023  Patient name: Ghislaine Corrales  : 1945  MRN: [id number]  Referring provider: Christopher Elizalde*  Dx:   Encounter Diagnosis     ICD-10-CM    1  S/P reverse total shoulder arthroplasty, right  Z96 611       2  Acute pain of right shoulder  M25 511           Start Time: 1700  Stop Time: 1745  Total time in clinic (min): 45 minutes    Assessment  Assessment details: Pt is an [de-identified] y o female who presents to skilled OPPT for her 21st visit s/p R reverse total shoulder arthroplasty  Pt demonstrated continued increased R GH A/PROM with tightness at end ranges but no pain, slightly increased R GH MMT in multiple planes, fair posture, no TTP globally around the R shoulder, and poor, but improving, body mechanics  Pt continues to make steady progress since starting PT, however is still limited with her AROM compared to her PROM showing decreased strength which continues to impact her functionally throughout the day  Pt's remaining deficits are preventing her from performing self care, ADLs, and recreational activities without compensations  Pt was re-educated on her diagnosis, prognosis, precautions/contraindications, HEP, and POC  Pt was educated that she could resume sleeping in bed, which she acknowledged she isn't out of fear  Pt would continue to benefit from skilled physical therapy to reduce her discomfort, address her deficits, progress towards her goals, and return to her PLOF  Impairments: abnormal coordination, abnormal muscle firing, abnormal muscle tone, abnormal or restricted ROM, impaired physical strength, scapular dyskinesis, poor posture  and poor body mechanics    Symptom irritability: moderateUnderstanding of Dx/Px/POC: good   Prognosis: fair    Goals  Short Term Goals:  1) Pt will initiate and progress her HEP in 4-6 weeks  - Met  2) Pt will improve GH PROM by 50% with less than 2/10 pain to improve ADLs in 4-6 weeks  - Met  3) Pt will begin AROM exercises with less than 3/10 pain to improve ADLs and self care in 4-6 weeks  - Met    Long Term Goals:  1) Pt will be able to sleep throughout the night, 6-8 hours, with less than 2/10 pain in 8-12 weeks  - Met  2) Pt will be able to reach a cabinet at shoulder height with less than 2/10 pain in 8-12 weeks  - Met  3) Pt will be able to perform ADLs with less than 2/10 pain in 8-12 weeks  - Partially Met  4) Pt will be able to shower and clean her home with less than 3/10 pain in 8-12 weeks  - Met  5) Pt will be able to work in her garden for 15 mins with less than 2/10 pain in 8-12 weeks  - Progressing    Plan  Patient would benefit from: skilled physical therapy and PT eval  Planned modality interventions: cryotherapy  Planned therapy interventions: activity modification, ADL retraining, joint mobilization, manual therapy, motor coordination training, neuromuscular re-education, body mechanics training, IADL retraining, patient education, postural training, coordination, self care, strengthening, stretching, therapeutic activities, therapeutic exercise, flexibility, functional ROM exercises, graded exercise and home exercise program  Frequency: 2x week  Duration in weeks: 6  Treatment plan discussed with: patient        Subjective Evaluation    History of Present Illness  Date of surgery: 3/2/2023  Mechanism of injury: surgery  Mechanism of injury: 23 Update:  Pt feels her shoulder is moving better, still gets tightness in the front of her shoulder  Most things have gotten better at home, she feels she can reach out to grab things, she is able to wash dishes, she is able to wash her hair now but is unable to do her hair how she wants  Pt is able to get dressed very well, little difficulty  Pt has still been sleeping in her recliner due to comfort and nervousness about injuring her shoulder     Pain  Current pain ratin  At best pain ratin  At worst pain ratin (tightness and sore)  Quality: tight  Aggravating factors: overhead activity and lifting  Progression: improved    Social Support  Lives with: spouse    Employment status: not working  Hand dominance: right    Patient Goals  Patient goals for therapy: increased motion, decreased edema, increased strength, independence with ADLs/IADLs and return to sport/leisure activities  Patient goal: Pt wants to get back to gardening        Objective     Static Posture     Head  Forward  Shoulders  Asymmetric shoulders, elevated and rounded  Tenderness     Right Shoulder  No tenderness in the [facility] joint, biceps tendon (proximal) and bicipital groove       Active Range of Motion   Left Shoulder   Flexion: 142 degrees   Abduction: 135 degrees   External rotation BTH: T3     Right Shoulder   Flexion: 105 degrees   Abduction: 93 degrees   External rotation BTH: C7     Right Elbow   Flexion: 145 degrees   Extension: 10 degrees   Forearm supination: 90 degrees   Forearm pronation: 90 degrees     Right Wrist   Wrist flexion: 40 degrees   Wrist extension: 70 degrees   Radial deviation: 22 degrees   Ulnar deviation: 10 degrees     Passive Range of Motion     Right Shoulder   Flexion: 140 degrees   Abduction: 125 degrees   External rotation 0°: 60 degrees   External rotation 45°: 39 degrees   External rotation 90°: 25 degrees   Internal rotation 0°: [facility]  Internal rotation 45°: 65 degrees   Internal rotation 90°: 40 degrees     Right Elbow   Flexion: 147 degrees   Extension: 8 degrees   Forearm supination: 90 degrees   Forearm pronation: 95 degrees     Strength/Myotome Testing     Right Shoulder     Planes of Motion   Flexion: 4-   Abduction: 3+   External rotation at 0°: 3   Internal rotation at 0°: 4-     Left Elbow   Flexion: 4+  Extension: 4+  Forearm supination: 4+  Forearm pronation: 4+    Right Elbow   Flexion: 4  Extension: 4  Forearm supination: 4-  Forearm pronation: 4-    Additional Strength Details  Within available motion                Precautions: Reverse Total Shoulder Protocol-- No GH extension past neutral, NO behind the back movement, Hx of breast cancer  HEP: Access Code [email or web address]  KX MODIFIER  IE: 23   PN: 5/10/23  PN: 23  Date 23   Visit Number 21 20 23 18 17 16   Manuals         PROM  Performed flex, abd, ER  Performed flex, abd, ER  Performed flex, abd, ER   AAROM         STM                  Neuro Re-Ed          scap retractions         rows  Single arm emily 4 0 3x10    Extension emily 1 0 3x10 Single arm emily 4 0 3x10    Extension emily 1 0 3x10 Rows: Yellow 10 x 3  extension: yellow: 3 x 10    Rows: Yellow 10 x 3  extension: yellow: 3 x 10     No money    3x10 single arm 3x10 single arm 10 x 3    Ball rolls at table         [employer] ER AAROM    Seated 2x10   Stand:10 x 3     GH ER/IR isos    ER/IR walkouts green 10x IR, yellow 10x ER ER/IR walkouts green 10x IR, yellow 10x ER    Supine GH ABC     Standing 2x Standin x    Supine punches         SL GH ER  SL 3x10 SL 3x10   SL: 10 x 3   SL gh abd  SL 3x10 SL 3x10  SL 3x10 SL: 10 x 3    Ther Ex         pendulums         Table slides  W/ball flex & ABD 30x  W/ball flex & abd 30x W/ball flex & abd 30x W/ball: 30 x    Elbow ROM         Wrist ROM         Standing GH PROM abd         AAROM supine flex  Flexion supine 3x10 w/cane Supine AROM flex no cane 2x10 Flexion supine 3x10 w/cane Flexion supine 3x10 w/cane Flexion: supine 10 x 3 with cane  Abd: 10 x  3   Standing AROM flex  W/1lb 2x10    scaption w/1lb 2x10  W/1lb 2x10    scaption w/1lb 2x10 W/1lb 2x10    scaption w/1lb 2x10 10 x 2    Sup cane ER    3x10 with cane  10 x 3 with cane   Wall slides  Flex 3x10 Flex 2x10    2x10 flex w/GH horiz abd & add to tension      Standing flexion into horiz abd and abd into horiz add   2x10 ea

## 2023-06-12 ENCOUNTER — OFFICE VISIT (OUTPATIENT)
Dept: FAMILY MEDICINE CLINIC | Facility: CLINIC | Age: 78
End: 2023-06-12
Payer: MEDICARE

## 2023-06-12 ENCOUNTER — OFFICE VISIT (OUTPATIENT)
Dept: PHYSICAL THERAPY | Facility: CLINIC | Age: 78
End: 2023-06-12
Payer: MEDICARE

## 2023-06-12 VITALS
OXYGEN SATURATION: 98 % | HEIGHT: 57 IN | RESPIRATION RATE: 18 BRPM | TEMPERATURE: 97 F | WEIGHT: 155 LBS | BODY MASS INDEX: 33.44 KG/M2 | HEART RATE: 64 BPM | DIASTOLIC BLOOD PRESSURE: 60 MMHG | SYSTOLIC BLOOD PRESSURE: 130 MMHG

## 2023-06-12 DIAGNOSIS — M25.511 ACUTE PAIN OF RIGHT SHOULDER: ICD-10-CM

## 2023-06-12 DIAGNOSIS — E03.9 ACQUIRED HYPOTHYROIDISM: ICD-10-CM

## 2023-06-12 DIAGNOSIS — Z96.611 S/P REVERSE TOTAL SHOULDER ARTHROPLASTY, RIGHT: Primary | ICD-10-CM

## 2023-06-12 DIAGNOSIS — Z96.611 S/P REVERSE TOTAL SHOULDER ARTHROPLASTY, RIGHT: ICD-10-CM

## 2023-06-12 DIAGNOSIS — Z00.00 MEDICARE ANNUAL WELLNESS VISIT, SUBSEQUENT: Primary | ICD-10-CM

## 2023-06-12 DIAGNOSIS — D50.8 IRON DEFICIENCY ANEMIA SECONDARY TO INADEQUATE DIETARY IRON INTAKE: ICD-10-CM

## 2023-06-12 DIAGNOSIS — I10 PRIMARY HYPERTENSION: ICD-10-CM

## 2023-06-12 PROBLEM — U07.1 COVID-19: Status: RESOLVED | Noted: 2023-03-07 | Resolved: 2023-06-12

## 2023-06-12 PROBLEM — M17.0 BILATERAL PRIMARY OSTEOARTHRITIS OF KNEE: Status: ACTIVE | Noted: 2023-03-09

## 2023-06-12 PROBLEM — Z01.818 PRE-OPERATIVE CLEARANCE: Status: RESOLVED | Noted: 2023-02-22 | Resolved: 2023-06-12

## 2023-06-12 PROCEDURE — 99214 OFFICE O/P EST MOD 30 MIN: CPT | Performed by: NURSE PRACTITIONER

## 2023-06-12 PROCEDURE — 97112 NEUROMUSCULAR REEDUCATION: CPT | Performed by: PHYSICAL THERAPIST

## 2023-06-12 PROCEDURE — 97110 THERAPEUTIC EXERCISES: CPT | Performed by: PHYSICAL THERAPIST

## 2023-06-12 PROCEDURE — G0439 PPPS, SUBSEQ VISIT: HCPCS | Performed by: NURSE PRACTITIONER

## 2023-06-12 NOTE — PATIENT INSTRUCTIONS
Medicare Preventive Visit Patient Instructions  Thank you for completing your Welcome to Medicare Visit or Medicare Annual Wellness Visit today  Your next wellness visit will be due in one year (6/12/2024)  The screening/preventive services that you may require over the next 5-10 years are detailed below  Some tests may not apply to you based off risk factors and/or age  Screening tests ordered at today's visit but not completed yet may show as past due  Also, please note that scanned in results may not display below  Preventive Screenings:  Service Recommendations Previous Testing/Comments   Colorectal Cancer Screening  * Colonoscopy    * Fecal Occult Blood Test (FOBT)/Fecal Immunochemical Test (FIT)  * Fecal DNA/Cologuard Test  * Flexible Sigmoidoscopy Age: 39-70 years old   Colonoscopy: every 10 years (may be performed more frequently if at higher risk)  OR  FOBT/FIT: every 1 year  OR  Cologuard: every 3 years  OR  Sigmoidoscopy: every 5 years  Screening may be recommended earlier than age 39 if at higher risk for colorectal cancer  Also, an individualized decision between you and your healthcare provider will decide whether screening between the ages of 74-80 would be appropriate  Colonoscopy: Not on file  FOBT/FIT: 03/08/2023  Cologuard: Not on file  Sigmoidoscopy: Not on file          Breast Cancer Screening Age: 36 years old  Frequency: every 1-2 years  Not required if history of left and right mastectomy Mammogram: Not on file    History Breast Cancer   Cervical Cancer Screening Between the ages of 21-29, pap smear recommended once every 3 years  Between the ages of 33-67, can perform pap smear with HPV co-testing every 5 years     Recommendations may differ for women with a history of total hysterectomy, cervical cancer, or abnormal pap smears in past  Pap Smear: Not on file    Screening Not Indicated   Hepatitis C Screening Once for adults born between 1945 and 1965  More frequently in patients at high risk for Hepatitis C Hep C Antibody: 05/12/2022    Screening Current   Diabetes Screening 1-2 times per year if you're at risk for diabetes or have pre-diabetes Fasting glucose: 108 mg/dL (3/8/2023)  A1C: 5 0 (12/18/2018)  Screening Current   Cholesterol Screening Once every 5 years if you don't have a lipid disorder  May order more often based on risk factors  Lipid panel: 05/30/2023    Screening Current     Other Preventive Screenings Covered by Medicare:  1  Abdominal Aortic Aneurysm (AAA) Screening: covered once if your at risk  You're considered to be at risk if you have a family history of AAA  2  Lung Cancer Screening: covers low dose CT scan once per year if you meet all of the following conditions: (1) Age 50-69; (2) No signs or symptoms of lung cancer; (3) Current smoker or have quit smoking within the last 15 years; (4) You have a tobacco smoking history of at least 20 pack years (packs per day multiplied by number of years you smoked); (5) You get a written order from a healthcare provider  3  Glaucoma Screening: covered annually if you're considered high risk: (1) You have diabetes OR (2) Family history of glaucoma OR (3)  aged 48 and older OR (3)  American aged 72 and older  3  Osteoporosis Screening: covered every 2 years if you meet one of the following conditions: (1) You're estrogen deficient and at risk for osteoporosis based off medical history and other findings; (2) Have a vertebral abnormality; (3) On glucocorticoid therapy for more than 3 months; (4) Have primary hyperparathyroidism; (5) On osteoporosis medications and need to assess response to drug therapy  · Last bone density test (DXA Scan): 06/01/2022   5  HIV Screening: covered annually if you're between the age of 15-65  Also covered annually if you are younger than 13 and older than 72 with risk factors for HIV infection   For pregnant patients, it is covered up to 3 times per pregnancy  Immunizations:  Immunization Recommendations   Influenza Vaccine Annual influenza vaccination during flu season is recommended for all persons aged >= 6 months who do not have contraindications   Pneumococcal Vaccine   * Pneumococcal conjugate vaccine = PCV13 (Prevnar 13), PCV15 (Vaxneuvance), PCV20 (Prevnar 20)  * Pneumococcal polysaccharide vaccine = PPSV23 (Pneumovax) Adults 25-60 years old: 1-3 doses may be recommended based on certain risk factors  Adults 72 years old: 1-2 doses may be recommended based off what pneumonia vaccine you previously received   Hepatitis B Vaccine 3 dose series if at intermediate or high risk (ex: diabetes, end stage renal disease, liver disease)   Tetanus (Td) Vaccine - COST NOT COVERED BY MEDICARE PART B Following completion of primary series, a booster dose should be given every 10 years to maintain immunity against tetanus  Td may also be given as tetanus wound prophylaxis  Tdap Vaccine - COST NOT COVERED BY MEDICARE PART B Recommended at least once for all adults  For pregnant patients, recommended with each pregnancy  Shingles Vaccine (Shingrix) - COST NOT COVERED BY MEDICARE PART B  2 shot series recommended in those aged 48 and above     Health Maintenance Due:      Topic Date Due   • Hepatitis C Screening  Completed     Immunizations Due:      Topic Date Due   • Pneumococcal Vaccine: 65+ Years (2 - PPSV23 if available, else PCV20) 12/18/2019   • COVID-19 Vaccine (3 - Moderna series) 05/25/2021   • Influenza Vaccine (Season Ended) 09/01/2023     Advance Directives   What are advance directives? Advance directives are legal documents that state your wishes and plans for medical care  These plans are made ahead of time in case you lose your ability to make decisions for yourself  Advance directives can apply to any medical decision, such as the treatments you want, and if you want to donate organs  What are the types of advance directives?   There are many types of advance directives, and each state has rules about how to use them  You may choose a combination of any of the following:  · Living will: This is a written record of the treatment you want  You can also choose which treatments you do not want, which to limit, and which to stop at a certain time  This includes surgery, medicine, IV fluid, and tube feedings  · Durable power of  for healthcare [place] [facility]): This is a written record that states who you want to make healthcare choices for you when you are unable to make them for yourself  This person, called a proxy, is usually a family member or a friend  You may choose more than 1 proxy  · Do not resuscitate (DNR) order:  A DNR order is used in case your heart stops beating or you stop breathing  It is a request not to have certain forms of treatment, such as CPR  A DNR order may be included in other types of advance directives  · Medical directive: This covers the care that you want if you are in a coma, near death, or unable to make decisions for yourself  You can list the treatments you want for each condition  Treatment may include pain medicine, surgery, blood transfusions, dialysis, IV or tube feedings, and a ventilator (breathing machine)  · Values history: This document has questions about your views, beliefs, and how you feel and think about life  This information can help others choose the care that you would choose  Why are advance directives important? An advance directive helps you control your care  Although spoken wishes may be used, it is better to have your wishes written down  Spoken wishes can be misunderstood, or not followed  Treatments may be given even if you do not want them  An advance directive may make it easier for your family to make difficult choices about your care     Weight Management   Why it is important to manage your weight:  Being overweight increases your risk of health conditions such as heart disease, high blood pressure, type 2 diabetes, and certain types of cancer  It can also increase your risk for osteoarthritis, sleep apnea, and other respiratory problems  Aim for a slow, steady weight loss  Even a small amount of weight loss can lower your risk of health problems  How to lose weight safely:  A safe and healthy way to lose weight is to eat fewer calories and get regular exercise  You can lose up about 1 pound a week by decreasing the number of calories you eat by 500 calories each day  Healthy meal plan for weight management:  A healthy meal plan includes a variety of foods, contains fewer calories, and helps you stay healthy  A healthy meal plan includes the following:  · Eat whole-grain foods more often  A healthy meal plan should contain fiber  Fiber is the part of grains, fruits, and vegetables that is not broken down by your body  Whole-grain foods are healthy and provide extra fiber in your diet  Some examples of whole-grain foods are whole-wheat breads and pastas, oatmeal, brown rice, and bulgur  · Eat a variety of vegetables every day  Include dark, leafy greens such as spinach, kale, kobe greens, and mustard greens  Eat yellow and orange vegetables such as carrots, sweet potatoes, and winter squash  · Eat a variety of fruits every day  Choose fresh or canned fruit (canned in its own juice or light syrup) instead of juice  Fruit juice has very little or no fiber  · Eat low-fat dairy foods  Drink fat-free (skim) milk or 1% milk  Eat fat-free yogurt and low-fat cottage cheese  Try low-fat cheeses such as mozzarella and other reduced-fat cheeses  · Choose meat and other protein foods that are low in fat  Choose beans or other legumes such as split peas or lentils  Choose fish, skinless poultry (chicken or turkey), or lean cuts of red meat (beef or pork)  Before you cook meat or poultry, cut off any visible fat  · Use less fat and oil  Try baking foods instead of frying them   Add less fat, such as margarine, sour cream, regular salad dressing and mayonnaise to foods  Eat fewer high-fat foods  Some examples of high-fat foods include french fries, doughnuts, ice cream, and cakes  · Eat fewer sweets  Limit foods and drinks that are high in sugar  This includes candy, cookies, regular soda, and sweetened drinks  Exercise:  Exercise at least 30 minutes per day on most days of the week  Some examples of exercise include walking, biking, dancing, and swimming  You can also fit in more physical activity by taking the stairs instead of the elevator or parking farther away from stores  Ask your healthcare provider about the best exercise plan for you  © Copyright [employer] 2018 Information is for End User's use only and may not be sold, redistributed or otherwise used for commercial purposes   All illustrations and images included in CareNotes® are the copyrighted property of A D A M , [employer]  or [address] [place] [address]

## 2023-06-12 NOTE — PROGRESS NOTES
Daily Note     Today's date: 2023  Patient name: Jory Christine  : 1945  MRN: [id number]  Referring provider: Leif Oneil*  Dx:   Encounter Diagnosis     ICD-10-CM    1  S/P reverse total shoulder arthroplasty, right  Z96 [id number]       2  Acute pain of right shoulder  M25 [id number]                      Subjective: Jory Christine reports her shoulder is doing well  She continues with tightness in front of shoulder  Objective: See treatment diary below      Assessment: Tolerated treatment well  Patient demonstrated fatigue post treatment, while requiring tactile cues to improve RTC recruitment and reduce scapular hiking with standing abduction  Plan: Continue per plan of care  Progress treament per protocol          Precautions: Reverse Total Shoulder Protocol-- No GH extension past neutral, NO behind the back movement, Hx of breast cancer  HEP: Access Code [email or web address]  KX MODIFIER  IE: 23   PN: 5/10/23  PN: 23  Date    Visit Number 25 [id number] 21 19 18   Manuals        PROM Performed flex, abd, ER  Performed flex, abd, ER  Performed flex, abd, ER   AAROM        STM                Neuro Re-Ed         Stand flex/scap/abd To prior to evidence of hike  15x ea in mirror       rows Single arm emily 4 0 3x10    Extension emily 1 0 3x10  Single arm emily 4 0 3x10    Extension emily 1 0 3x10 Single arm emily 4 0 3x10    Extension emily 1 0 3x10 Rows: Yellow 10 x 3  extension: yellow: 3 x 10     No money     3x10 single arm   Ball rolls at table        [employer] ER AAROM     Seated 2x10    GH ER/IR isos     ER/IR walkouts green 10x IR, yellow 10x ER   Supine GH ABC        Fxl ER/IR 2x10       SL GH ER SL 3x10  SL 3x10 SL 3x10    SL gh abd SL 3x10  SL 3x10 SL 3x10    Ther Ex        pendulums        Table slides Flex + abd 30x ea w/ Yellow ball  W/ball flex & ABD 30x  W/ball flex & abd 30x   Elbow ROM        Wrist ROM        Standing GH PROM abd        AAROM supine flex 3x10 cane  Flexion supine 3x10 w/cane Supine AROM flex no cane 2x10 Flexion supine 3x10 w/cane   Standing AROM flex   W/1lb 2x10    scaption w/1lb 2x10  W/1lb 2x10    scaption w/1lb 2x10   Sup cane ER     3x10 with cane   Wall slides   Flex 3x10 Flex 2x10    2x10 flex w/GH horiz abd & add to tension    Standing flexion into horiz abd and abd into horiz add    2x10 ea

## 2023-06-12 NOTE — PROGRESS NOTES
Assessment and Plan:     Problem List Items Addressed This Visit        Endocrine    Hypothyroidism     Thyroid studies wnl  Reviewed with patient  Stable, no changes  Cardiovascular and Mediastinum    Hypertension     BP stable in office today  Continue medications as directed  Other    Anemia    Relevant Orders    CBC and differential    S/P reverse total shoulder arthroplasty, right     Pt continues PT and doing well overall  Stable, no issues  Other Visit Diagnoses     Medicare annual wellness visit, subsequent    -  Primary    Age appropriate screenings and recommendations discussed  Fasting labs reviewed  BMI Counseling: Body mass index is 33 54 kg/m²  The BMI is above normal  Nutrition recommendations include encouraging healthy choices of fruits and vegetables  Exercise recommendations include exercising 3-5 times per week  Rationale for BMI follow-up plan is due to patient being overweight or obese  Preventive health issues were discussed with patient, and age appropriate screening tests were ordered as noted in patient's After Visit Summary  Personalized health advice and appropriate referrals for health education or preventive services given if needed, as noted in patient's After Visit Summary  History of Present Illness:     Patient presents for a Medicare Wellness Visit    Navid Varner is an [de-identified]year old female with hypertension, hyperlipidemia, joint pain, prediabetes, hypothyroidism, who presents to the office for her annual medicare wellness exam  Reports that she is dong well overall  Denies fever, chills, chest pain, shortness of breath  No new acute complaints today  Patient Care Team:  Timbo Herring as PCP - General (Family Medicine)     Review of Systems:     Review of Systems   Constitutional: Negative for chills, diaphoresis, fatigue and fever  HENT: Negative for ear pain      Respiratory: Negative for cough, chest tightness and shortness of breath  Cardiovascular: Negative for chest pain, palpitations and leg swelling  Gastrointestinal: Negative for abdominal pain, constipation and diarrhea  Musculoskeletal: Positive for arthralgias and myalgias  Neurological: Negative for dizziness, numbness and headaches  Psychiatric/Behavioral: Negative for sleep disturbance          Problem List:     Patient Active Problem List   Diagnosis   • Abnormal blood sugar   • Hypertension   • Hypothyroidism   • Osteoarthritis of knees, bilateral   • Overweight   • Pre-diabetes   • History of breast cancer   • Obesity, morbid (HCC)   • Anemia   • Gait difficulty   • Generalized weakness   • S/P reverse total shoulder arthroplasty, right   • Presence of right artificial shoulder joint   • Bilateral primary osteoarthritis of knee      Past Medical and Surgical History:     Past Medical History:   Diagnosis Date   • Cancer ([facility] [address] )    • Disease of thyroid gland    • Hyperlipidemia    • Hypertension    • 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 ([facility] [address] )      Past Surgical History:   Procedure Laterality Date   • BREAST LUMPECTOMY Right    • HYSTERECTOMY      partial - age 42   • NC ARTHROPLASTY GLENOHUMERAL JOINT TOTAL SHOULDER Right 03/02/2023    Procedure: Reverse Total Shoulder Arthroplasty - SAME DAY DISCHARGE;  Surgeon: Precious Jauregui MD;  Location: [facility];  Service: Orthopedics   • SHOULDER OPEN ROTATOR CUFF REPAIR Right 03/13/2023      Family History:     Family History   Problem Relation Age of Onset   • Other Mother         DJD, cervical   • Diabetes Father         DM   • Autism Son    • Substance Abuse Neg Hx    • Mental illness Neg Hx       Social History:     Social History     Socioeconomic History   • Marital status: /Civil Union     Spouse name: None   • Number of children: None   • Years of education: None   • Highest education level: None   Occupational History   • None   Tobacco Use   • Smoking status: Never   • Smokeless tobacco: Never   Vaping Use   • Vaping Use: Never used   Substance and Sexual Activity   • Alcohol use: Never   • Drug use: No   • Sexual activity: None   Other Topics Concern   • None   Social History Narrative    Dental care, regularly    Caffeine use    Tea     Social Determinants of Health     Financial Resource Strain: Low Risk  (6/5/2023)    Overall Financial Resource Strain (CARDIA)    • Difficulty of Paying Living Expenses: Not hard at all   Food Insecurity: Not on file   Transportation Needs: No Transportation Needs (6/5/2023)    PRAPARE - Transportation    • Lack of Transportation (Medical): No    • Lack of Transportation (Non-Medical): No   Physical Activity: Not on file   Stress: Not on file   Social Connections: Not on file   Intimate Partner Violence: Not on file   Housing Stability: Unknown (3/8/2023)    Housing Stability Vital Sign    • Unable to Pay for Housing in the Last Year: No    • Number of Places Lived in the Last Year: Not on file    • Unstable Housing in the Last Year: No      Medications and Allergies:     Current Outpatient Medications   Medication Sig Dispense Refill   • acetaminophen (TYLENOL) 325 mg tablet Take 3 tablets (975 mg total) by mouth 2 (two) times a day  0   • amLODIPine (NORVASC) 5 mg tablet Take 5 mg by mouth in the morning  • CALCIUM PO Take 600 mg by mouth in the morning     • cholecalciferol (VITAMIN D3) 1,000 units tablet Take 2 tablets (2,000 Units total) by mouth daily  0   • ferrous sulfate 325 (65 Fe) mg tablet Take 1 tablet (325 mg total) by mouth daily with breakfast Do not start before March 10, 2023   0   • levothyroxine 88 mcg tablet Take 1 tablet (88 mcg total) by mouth daily 90 tablet 3   • losartan (COZAAR) 100 MG tablet Take 25 mg by mouth daily     • cyanocobalamin (VITAMIN B-12) 1000 MCG tablet Take 1 tablet (1,000 mcg total) by mouth daily Do not start before March 10, 2023   (Patient not taking: Reported on 3/28/2023)  0     No current facility-administered medications for this visit  Allergies   Allergen Reactions   • Zoledronic Acid Edema      Immunizations:     Immunization History   Administered Date(s) Administered   • COVID-19 MODERNA VACC 0 5 ML IM 03/02/2021, 03/30/2021   • Influenza, high dose seasonal 0 7 mL 12/18/2018, 02/27/2020   • Pneumococcal Conjugate 13-Valent 12/18/2018      Health Maintenance:         Topic Date Due   • Hepatitis C Screening  Completed         Topic Date Due   • Pneumococcal Vaccine: 65+ Years (2 - PPSV23 if available, else PCV20) 12/18/2019   • COVID-19 Vaccine (3 - Moderna series) 05/25/2021   • Influenza Vaccine (Season Ended) 09/01/2023      Medicare Screening Tests and Risk Assessments:     Last Medicare Wellness visit information reviewed, patient interviewed and updates made to the record today  Health Risk Assessment:   Patient rates overall health as good  Patient feels that their physical health rating is same  Patient is very satisfied with their life  Eyesight was rated as much worse  Hearing was rated as slightly worse  Patient feels that their emotional and mental health rating is same  Patients states they are never, rarely angry  Patient states they are never, rarely unusually tired/fatigued  Pain experienced in the last 7 days has been a lot  Patient's pain rating has been 6/10  Patient states that she has experienced no weight loss or gain in last 6 months  Fall Risk Screening: In the past year, patient has experienced: no history of falling in past year      Urinary Incontinence Screening:   Patient has not leaked urine accidently in the last six months  Home Safety:  Patient does not have trouble with stairs inside or outside of their home  Patient has working smoke alarms and has working carbon monoxide detector  Home safety hazards include: none  Nutrition:   Current diet is Regular  Medications:   Patient is currently taking over-the-counter supplements  OTC medications include: Iron calcium and vitamin d  Patient is able to manage medications  Activities of Daily Living (ADLs)/Instrumental Activities of Daily Living (IADLs):   Walk and transfer into and out of bed and chair?: Yes  Dress and groom yourself?: Yes    Bathe or shower yourself?: Yes    Feed yourself? Yes  Do your laundry/housekeeping?: Yes  Manage your money, pay your bills and track your expenses?: Yes  Make your own meals?: Yes    Do your own shopping?: Yes    Previous Hospitalizations:   Any hospitalizations or ED visits within the last 12 months?: Yes    How many hospitalizations have you had in the last year?: 1-2    Advance Care Planning:   Living will: No    Durable POA for healthcare: No    Advanced directive: No    Advanced directive counseling given: Yes    Five wishes given: Yes    End of Life Decisions reviewed with patient: Yes      Cognitive Screening:   Provider or family/friend/caregiver concerned regarding cognition?: No    PREVENTIVE SCREENINGS      Cardiovascular Screening:    General: Screening Current      Diabetes Screening:     General: Screening Current      Colorectal Cancer Screening:     General: Screening Not Indicated      Breast Cancer Screening:     General: History Breast Cancer      Cervical Cancer Screening:    General: Screening Not Indicated      Lung Cancer Screening:     General: Screening Not Indicated      Hepatitis C Screening:    General: Screening Current    Screening, Brief Intervention, and Referral to Treatment (SBIRT)    Screening  Typical number of drinks in a day: 0  Typical number of drinks in a week: 0  Interpretation: Low risk drinking behavior      AUDIT-C Screenin) How often did you have a drink containing alcohol in the past year? never  2) How many drinks did you have on a typical day when you were drinking in the past year? 0  3) How often did you have 6 or more drinks on one occasion in the past year? never    AUDIT-C Score: "0  Interpretation: Score 0-2 (female): Negative screen for alcohol misuse    Single Item Drug Screening:  How often have you used an illegal drug (including marijuana) or a prescription medication for non-medical reasons in the past year? never    Single Item Drug Screen Score: 0  Interpretation: Negative screen for possible drug use disorder    Other Counseling Topics:   Car/seat belt/driving safety, skin self-exam, sunscreen and calcium and vitamin D intake and regular weightbearing exercise  Physical Exam:     /60 (BP Location: Left arm, Patient Position: Sitting, Cuff Size: Large)   Pulse 64   Temp (!) 97 °F (36 1 °C) (Temporal)   Resp 18   Ht 4' 9\" (1 448 m)   Wt 70 3 kg (155 lb)   SpO2 98%   BMI 33 54 kg/m²     Physical Exam  Vitals reviewed  Constitutional:       Appearance: Normal appearance  HENT:      Head: Normocephalic and atraumatic  Cardiovascular:      Rate and Rhythm: Normal rate and regular rhythm  Pulses: Normal pulses  Heart sounds: Normal heart sounds  Pulmonary:      Effort: Pulmonary effort is normal       Breath sounds: Normal breath sounds  Musculoskeletal:         General: Normal range of motion  Skin:     General: Skin is warm and dry  Neurological:      Mental Status: She is alert and oriented to person, place, and time     Psychiatric:         Mood and Affect: Mood normal           Mendez Necessary, CRNP  "

## 2023-06-14 ENCOUNTER — APPOINTMENT (OUTPATIENT)
Dept: PHYSICAL THERAPY | Facility: CLINIC | Age: 78
End: 2023-06-14
Payer: MEDICARE

## 2023-06-15 ENCOUNTER — OFFICE VISIT (OUTPATIENT)
Dept: PHYSICAL THERAPY | Facility: CLINIC | Age: 78
End: 2023-06-15
Payer: MEDICARE

## 2023-06-15 DIAGNOSIS — M25.511 ACUTE PAIN OF RIGHT SHOULDER: ICD-10-CM

## 2023-06-15 DIAGNOSIS — Z96.611 S/P REVERSE TOTAL SHOULDER ARTHROPLASTY, RIGHT: Primary | ICD-10-CM

## 2023-06-15 PROCEDURE — 97112 NEUROMUSCULAR REEDUCATION: CPT

## 2023-06-15 PROCEDURE — 97110 THERAPEUTIC EXERCISES: CPT

## 2023-06-15 NOTE — PROGRESS NOTES
Daily Note     Today's date: 6/15/2023  Patient name: Jory Christine  : 1945  MRN: [id number]  Referring provider: Leif Oneil*  Dx:   Encounter Diagnosis     ICD-10-CM    1  S/P reverse total shoulder arthroplasty, right  Z96 611       2  Acute pain of right shoulder  M25 511           Start Time: 1700  Stop Time: 1750  Total time in clinic (min): 50 minutes    Subjective: Pt states that her shoulder feels heavy prior to the start of her treatment session but denies pain  She was sore after her last treatment session but no new complaints  Objective: See treatment diary below      Assessment: Tolerated treatment well  Patient demonstrated fatigue post treatment, exhibited good technique with therapeutic exercises and would benefit from continued PT      Plan: Continue per plan of care  Progress treatment as tolerated         Precautions: Reverse Total Shoulder Protocol-- No GH extension past neutral, NO behind the back movement, Hx of breast cancer  HEP: Access Code [email or web address]  KX MODIFIER  IE: 23   PN: 5/10/23  PN: 23  Date 6/15 6/12 6/8 6/7 6/1 5/31   Visit Number 23 22 21 20 19 18   Manuals         PROM  Performed flex, abd, ER  Performed flex, abd, ER  Performed flex, abd, ER   AAROM         STM                  Neuro Re-Ed          Stand flex/scap/abd 2x10 ea direction To prior to evidence of hike  15x ea in mirror       rows Single arm emily 5 0 3x10    Extension emily 1 2 3x10 Single arm emily 4 0 3x10    Extension emily 1 0 3x10  Single arm emily 4 0 3x10    Extension emily 1 0 3x10 Single arm emily 4 0 3x10    Extension emily 1 0 3x10 Rows: Yellow 10 x 3  extension: yellow: 3 x 10     No money      3x10 single arm   Ball rolls at table         [employer] ER AAROM      Seated 2x10    GH ER/IR isos      ER/IR walkouts green 10x IR, yellow 10x ER   Supine GH ABC SL ABC 2x        Fxl ER/IR 2x10 2x10       SL GH ER SL 3x10 SL 3x10  SL 3x10 SL 3x10    SL gh abd SL 3x10 SL 3x10  SL 3x10 SL 3x10    Ther Ex         pendulums         Table slides Flex + abd 30x ea w/ Yellow ball Flex + abd 30x ea w/ Yellow ball  W/ball flex & ABD 30x  W/ball flex & abd 30x   Elbow ROM         Wrist ROM         Standing GH PROM abd         AAROM supine flex 3x10 cane 3x10 cane  Flexion supine 3x10 w/cane Supine AROM flex no cane 2x10 Flexion supine 3x10 w/cane   Standing AROM flex    W/1lb 2x10    scaption w/1lb 2x10  W/1lb 2x10    scaption w/1lb 2x10   Sup cane ER      3x10 with cane   Wall slides    Flex 3x10 Flex 2x10    2x10 flex w/GH horiz abd & add to tension    Standing flexion into horiz abd and abd into horiz add     2x10 ea

## 2023-06-19 ENCOUNTER — OFFICE VISIT (OUTPATIENT)
Dept: PHYSICAL THERAPY | Facility: CLINIC | Age: 78
End: 2023-06-19
Payer: MEDICARE

## 2023-06-19 DIAGNOSIS — M25.511 ACUTE PAIN OF RIGHT SHOULDER: ICD-10-CM

## 2023-06-19 DIAGNOSIS — Z96.611 S/P REVERSE TOTAL SHOULDER ARTHROPLASTY, RIGHT: Primary | ICD-10-CM

## 2023-06-19 PROCEDURE — 97112 NEUROMUSCULAR REEDUCATION: CPT | Performed by: PHYSICAL THERAPIST

## 2023-06-19 PROCEDURE — 97110 THERAPEUTIC EXERCISES: CPT | Performed by: PHYSICAL THERAPIST

## 2023-06-19 NOTE — PROGRESS NOTES
Daily Note     Today's date: 2023  Patient name: Gary Martinez  : 1945  MRN: [id number]  Referring provider: Saud Delarosa*  Dx:   Encounter Diagnosis     ICD-10-CM    1  S/P reverse total shoulder arthroplasty, right  Z96 611       2  Acute pain of right shoulder  M25 511           Start Time: 1705  Stop Time: 1748  Total time in clinic (min): 43 minutes    Subjective: Client arrives with reports on trying to use her arm more naturally      Objective: See treatment diary below      Assessment: Tolerated treatment well  She  demonstrated fatigue post treatment, exhibited good technique with therapeutic exercises and would benefit from continued PT  Inititaed and trained on [address] abd transition into flex  Also required min cues for appropriate technique for FXL IR  Plan: Continue per plan of care  Progress treatment as tolerated         Precautions: Reverse Total Shoulder Protocol-- No GH extension past neutral, NO behind the back movement, Hx of breast cancer  HEP: Access Code [email or web address]  KX MODIFIER  IE: 23   PN: 5/10/23  PN: 23  Date 6/19 6/15 6/12 6/8 6/7 6/1 5/31   Visit Number  23 22 21 20 19 18   Manuals          PROM   Performed flex, abd, ER  Performed flex, abd, ER  Performed flex, abd, ER   AAROM          STM                    Neuro Re-Ed           Stand flex/scap/abd 2x10 ea direction 2x10 ea direction To prior to evidence of hike  15x ea in mirror       rows Single arm emily 4 0 3x10    Extension emily 1 5 2x15 Single arm emily 5 0 3x10    Extension emily 1 2 3x10 Single arm emily 4 0 3x10    Extension emily 1 0 3x10  Single arm emily 4 0 3x10    Extension emily 1 0 3x10 Single arm emily 4 0 3x10    Extension emily 1 0 3x10 Rows: Yellow 10 x 3  extension: yellow: 3 x 10     No money       3x10 single arm   Ball rolls at table          [employer] ER AAROM       Seated 2x10    GH ER/IR isos       ER/IR walkouts green 10x IR, yellow 10x ER   Supine GH ABC SL ABC 2x SL ABC 2x        Fxl ER/IR 2x15 2x10 2x10       SL GH ER SL 3x10 SL 3x10 SL 3x10  SL 3x10 SL 3x10    SL gh abd SL 3x10 SL 3x10 SL 3x10  SL 3x10 SL 3x10    Ther Ex          pendulums          Table slides Flex + abd 30x ea w/ Yellow ball Flex + abd 30x ea w/ Yellow ball Flex + abd 30x ea w/ Yellow ball  W/ball flex & ABD 30x  W/ball flex & abd 30x   Elbow ROM          Wrist ROM          Standing GH PROM abd          AAROM supine flex 3x10 cane 3x10 cane 3x10 cane  Flexion supine 3x10 w/cane Supine AROM flex no cane 2x10 Flexion supine 3x10 w/cane   Standing AROM flex     W/1lb 2x10    scaption w/1lb 2x10  W/1lb 2x10    scaption w/1lb 2x10   Sup cane ER 3x10 cane      3x10 with cane   Wall slides     Flex 3x10 Flex 2x10    2x10 flex w/GH horiz abd & add to tension    Standing flexion into horiz abd and abd into horiz add 2x10 each     2x10 ea

## 2023-06-22 ENCOUNTER — OFFICE VISIT (OUTPATIENT)
Dept: PHYSICAL THERAPY | Facility: CLINIC | Age: 78
End: 2023-06-22
Payer: MEDICARE

## 2023-06-22 DIAGNOSIS — M25.511 ACUTE PAIN OF RIGHT SHOULDER: ICD-10-CM

## 2023-06-22 DIAGNOSIS — Z96.611 S/P REVERSE TOTAL SHOULDER ARTHROPLASTY, RIGHT: Primary | ICD-10-CM

## 2023-06-22 PROCEDURE — 97110 THERAPEUTIC EXERCISES: CPT

## 2023-06-22 PROCEDURE — 97112 NEUROMUSCULAR REEDUCATION: CPT

## 2023-06-22 NOTE — PROGRESS NOTES
"Daily Note     Today's date: 2023  Patient name: Nicholas Ruelas  : 1945  MRN: [id number]  Referring provider: Holly Gamboa*  Dx:   Encounter Diagnosis     ICD-10-CM    1  S/P reverse total shoulder arthroplasty, right  Z96 611       2  Acute pain of right shoulder  M25 511           Start Time: 1700  Stop Time: 1745  Total time in clinic (min): 45 minutes    Subjective: Pt reports shoulder has been feeling ok, reports she banged her elbow, but reports it is \"ok\"      Objective: See treatment diary below      Assessment: Pt demonstrates improving strength, no c/o pain post treatment session, overall fatigued  Plan: Continue per plan of care  Progress treatment as tolerated         Precautions: Reverse Total Shoulder Protocol-- No GH extension past neutral, NO behind the back movement, Hx of breast cancer  HEP: Access Code [email or web address]  KX MODIFIER  IE: 23   PN: 5/10/23  PN: 23  Date 6/22 6/19 6/15 6/12 6/8 6/7 6/1 5/31   Visit Number 25  23 22 21 20 19 18   Manuals           PROM Performed flex, abd, ER   Performed flex, abd, ER  Performed flex, abd, ER  Performed flex, abd, ER   AAROM           STM                      Neuro Re-Ed            Stand flex/scap/abd 2x10 ea direction 1lb 2x10 ea direction 2x10 ea direction To prior to evidence of hike  15x ea in mirror       rows Banded row and ext 30x Single arm emily 4 0 3x10    Extension emily 1 5 2x15 Single arm emily 5 0 3x10    Extension emily 1 2 3x10 Single arm emily 4 0 3x10    Extension emily 1 0 3x10  Single arm emily 4 0 3x10    Extension emily 1 0 3x10 Single arm emily 4 0 3x10    Extension emily 1 0 3x10 Rows: Yellow 10 x 3  extension: yellow: 3 x 10     No money ER walkouts YTB 10x       3x10 single arm   Ball rolls at table           [employer] ER AAROM        Seated 2x10    GH ER/IR isos IR YTB 20x       ER/IR walkouts green 10x IR, yellow 10x ER   Supine GH ABC  SL ABC 2x SL ABC 2x        Fxl ER/IR  2x15 " 2x10 2x10       SL GH ER 3x10 1lb SL 3x10 SL 3x10 SL 3x10  SL 3x10 SL 3x10    SL gh abd 3x10 1lb SL 3x10 SL 3x10 SL 3x10  SL 3x10 SL 3x10    Ther Ex           pendulums           Table slides  Flex + abd 30x ea w/ Yellow ball Flex + abd 30x ea w/ Yellow ball Flex + abd 30x ea w/ Yellow ball  W/ball flex & ABD 30x  W/ball flex & abd 30x   Elbow ROM           Wrist ROM           Standing GH PROM abd           AAROM supine flex  3x10 cane 3x10 cane 3x10 cane  Flexion supine 3x10 w/cane Supine AROM flex no cane 2x10 Flexion supine 3x10 w/cane   Scalene stretch           Standing AROM flex Sh add ytb 20x     W/1lb 2x10    scaption w/1lb 2x10  W/1lb 2x10    scaption w/1lb 2x10   Sup cane ER  3x10 cane      3x10 with cane   Wall slides Flx/abd 20x with end range circles cw/ccw     Flex 3x10 Flex 2x10    2x10 flex w/GH horiz abd & add to tension    Standing flexion into horiz abd and abd into horiz add  2x10 each     2x10 ea

## 2023-06-26 ENCOUNTER — OFFICE VISIT (OUTPATIENT)
Dept: PHYSICAL THERAPY | Facility: CLINIC | Age: 78
End: 2023-06-26
Payer: MEDICARE

## 2023-06-26 DIAGNOSIS — Z96.611 S/P REVERSE TOTAL SHOULDER ARTHROPLASTY, RIGHT: Primary | ICD-10-CM

## 2023-06-26 DIAGNOSIS — M25.511 ACUTE PAIN OF RIGHT SHOULDER: ICD-10-CM

## 2023-06-26 PROCEDURE — 97110 THERAPEUTIC EXERCISES: CPT | Performed by: PHYSICAL THERAPIST

## 2023-06-26 PROCEDURE — 97112 NEUROMUSCULAR REEDUCATION: CPT | Performed by: PHYSICAL THERAPIST

## 2023-06-26 NOTE — PROGRESS NOTES
Daily Note     Today's date: 2023  Patient name: Sukumar Fritz  : 1945  MRN: [id number]  Referring provider: Bob Vogt*  Dx:   Encounter Diagnosis     ICD-10-CM    1  S/P reverse total shoulder arthroplasty, right  Z96 611       2  Acute pain of right shoulder  M25 511           Start Time: 1620  Stop Time: 1702  Total time in clinic (min): 42 minutes    Subjective: Client reports shoulder has been feeling ok, reaching behind her is most difficult  Objective: See treatment diary below      Assessment: Client with good participation throughout session  She requires min cues to reduce compensatory shoulder hiking throughout [facility] activities  Plan: Continue per plan of care  Progress treatment as tolerated         Precautions: Reverse Total Shoulder Protocol-- No GH extension past neutral, NO behind the back movement, Hx of breast cancer  HEP: Access Code [email or web address]  KX MODIFIER  IE: 23   PN: 5/10/23  PN: 23  Date 6/26 6/22 6/19 6/15 6/12 6/8 6/7 6/1 5/31   Visit Number [id number]  23 22 21 20 19 18   Manuals            PROM  Performed flex, abd, ER   Performed flex, abd, ER  Performed flex, abd, ER  Performed flex, abd, ER   AAROM            STM                        Neuro Re-Ed             Stand flex/scap/abd 2x10 ea direction 1lb 2x10 ea direction 1lb 2x10 ea direction 2x10 ea direction To prior to evidence of hike  15x ea in mirror       rows Single arm emily 4 0 3x10 Banded row and ext 30x Single arm emily 4 0 3x10    Extension emily 1 5 2x15 Single arm emily 5 0 3x10    Extension emily 1 2 3x10 Single arm emily 4 0 3x10    Extension emily 1 0 3x10  Single arm emily 4 0 3x10    Extension emily 1 0 3x10 Single arm emily 4 0 3x10    Extension emily 1 0 3x10 Rows: Yellow 10 x 3  extension: yellow: 3 x 10     No money With yellow tband 3x10 ER walkouts YTB 10x       3x10 single arm   Ball rolls at table            [employer] ER AAROM         Seated 2x10    GH ER/IR isos  IR YTB 20x       ER/IR walkouts green 10x IR, yellow 10x ER   Supine GH ABC   SL ABC 2x SL ABC 2x        Fxl ER/IR 2x15  2x15 2x10 2x10       SL GH ER  3x10 1lb SL 3x10 SL 3x10 SL 3x10  SL 3x10 SL 3x10    SL gh abd  3x10 1lb SL 3x10 SL 3x10 SL 3x10  SL 3x10 SL 3x10    Ther Ex            pendulums            Table slides Flex + abd 30x ea w/ Yellow ball  Flex + abd 30x ea w/ Yellow ball Flex + abd 30x ea w/ Yellow ball Flex + abd 30x ea w/ Yellow ball  W/ball flex & ABD 30x  W/ball flex & abd 30x   Elbow ROM            Wrist ROM            Standing GH PROM abd            AAROM supine flex   3x10 cane 3x10 cane 3x10 cane  Flexion supine 3x10 w/cane Supine AROM flex no cane 2x10 Flexion supine 3x10 w/cane   Scalene stretch            Standing AROM flex  Sh add ytb 20x     W/1lb 2x10    scaption w/1lb 2x10  W/1lb 2x10    scaption w/1lb 2x10   Sup cane ER   3x10 cane      3x10 with cane   Wall slides Flx 20x with end range circles cw/ccw Flx/abd 20x with end range circles cw/ccw     Flex 3x10 Flex 2x10    2x10 flex w/GH horiz abd & add to tension    Standing flexion into horiz abd and abd into horiz add 2x15 each   2x10 each     2x10 ea

## 2023-06-29 ENCOUNTER — OFFICE VISIT (OUTPATIENT)
Dept: PHYSICAL THERAPY | Facility: CLINIC | Age: 78
End: 2023-06-29
Payer: MEDICARE

## 2023-06-29 DIAGNOSIS — Z96.611 S/P REVERSE TOTAL SHOULDER ARTHROPLASTY, RIGHT: Primary | ICD-10-CM

## 2023-06-29 DIAGNOSIS — M25.511 ACUTE PAIN OF RIGHT SHOULDER: ICD-10-CM

## 2023-06-29 PROCEDURE — 97112 NEUROMUSCULAR REEDUCATION: CPT

## 2023-06-29 PROCEDURE — 97110 THERAPEUTIC EXERCISES: CPT

## 2023-06-29 NOTE — PROGRESS NOTES
Daily Note     Today's date: 2023  Patient name: Sukumar Fritz  : 1945  MRN: [id number]  Referring provider: Bob Vogt*  Dx:   Encounter Diagnosis     ICD-10-CM    1  S/P reverse total shoulder arthroplasty, right  Z96 611       2  Acute pain of right shoulder  M25 511           Start Time: 1705  Stop Time: 1750  Total time in clinic (min): 45 minutes    Subjective: Pt states that her shoulder is doing well  She is doing many ADLs without any shoulder pain  Still has difficulty reaching back behind her but feels improvement over the past few weeks  Objective: See treatment diary below      Assessment: Tolerated treatment well  Patient demonstrated fatigue post treatment, exhibited good technique with therapeutic exercises and would benefit from continued PT  Pt demonstrates improved ROM over the past few weeks, however continues to require VC and TC to properly perform her ER/IR exercises without compensations  Plan: Continue per plan of care  Progress treatment as tolerated         Precautions: Reverse Total Shoulder Protocol-- No GH extension past neutral, NO behind the back movement, Hx of breast cancer  HEP: Access Code [email or web address]  KX MODIFIER  IE: 23   PN: 5/10/23  PN: 23  Date 6/29 6/26 6/22 6/19 6/15 6/12   Visit Number 27 26 25  23 22   Manuals         PROM   Performed flex, abd, ER   Performed flex, abd, ER   AAROM         STM                  Neuro Re-Ed          Stand flex/scap/abd YTB 2x10 flex & scaption 2x10 ea direction 1lb 2x10 ea direction 1lb 2x10 ea direction 2x10 ea direction To prior to evidence of hike  15x ea in mirror   rows YTB 3x10 Single arm emily 4 0 3x10 Banded row and ext 30x Single arm emily 4 0 3x10    Extension emily 1 5 2x15 Single arm emily 5 0 3x10    Extension emily 1 2 3x10 Single arm emily 4 0 3x10    Extension emily 1 0 3x10   No money  With yellow tband 3x10 ER walkouts YTB 10x      Ball rolls at table         [employer] ER AAROM          GH ER/IR isos IR & ER YTB 3x10  IR YTB 20x      Supine GH ABC    SL ABC 2x SL ABC 2x    Fxl ER/IR 3x10 2x15  2x15 2x10 2x10   SL GH ER   3x10 1lb SL 3x10 SL 3x10 SL 3x10   SL gh abd   3x10 1lb SL 3x10 SL 3x10 SL 3x10   Ther Ex         pendulums 30x        Table slides  Flex + abd 30x ea w/ Yellow ball  Flex + abd 30x ea w/ Yellow ball Flex + abd 30x ea w/ Yellow ball Flex + abd 30x ea w/ Yellow ball   Elbow ROM         Wrist ROM         Standing GH PROM abd         AAROM supine flex    3x10 cane 3x10 cane 3x10 cane   Scalene stretch         Standing AROM flex   Sh add ytb 20x      Sup cane ER    3x10 cane     Wall slides  Flx 20x with end range circles cw/ccw Flx/abd 20x with end range circles cw/ccw      Standing flexion into horiz abd and abd into horiz add 2x10 ea w/2lbs 2x15 each   2x10 each

## 2023-07-03 ENCOUNTER — OFFICE VISIT (OUTPATIENT)
Dept: PHYSICAL THERAPY | Facility: CLINIC | Age: 78
End: 2023-07-03
Payer: MEDICARE

## 2023-07-03 DIAGNOSIS — Z96.611 S/P REVERSE TOTAL SHOULDER ARTHROPLASTY, RIGHT: Primary | ICD-10-CM

## 2023-07-03 DIAGNOSIS — M25.511 ACUTE PAIN OF RIGHT SHOULDER: ICD-10-CM

## 2023-07-03 PROCEDURE — 97112 NEUROMUSCULAR REEDUCATION: CPT | Performed by: PHYSICAL THERAPIST

## 2023-07-03 NOTE — PROGRESS NOTES
Daily Note     Today's date: 7/3/2023  Patient name: Vicki Edwards  : 1945  MRN: [id number]  Referring provider: Dianna Pa*  Dx:   Encounter Diagnosis     ICD-10-CM    1. S/P reverse total shoulder arthroplasty, right  Z96.611       2. Acute pain of right shoulder  M25.511                      Subjective: Vicki Edwards reports she continues to be unable to reach behind her back. Objective: See treatment diary below      Assessment: Tolerated treatment well. Patient continued with difficulty ERing with arm at side to neutral due to poor posterior RTC recruitment. She had better posterior recruitment with arm supported on table and TCs with arm in about 45 abd. She continued with severe lack of IR ROM. Plan: Continue per plan of care.       Precautions: Reverse Total Shoulder Protocol-- No GH extension past neutral, NO behind the back movement, Hx of breast cancer  HEP: Access Code [email or web address]  KX MODIFIER  IE: 23   PN: 5/10/23  PN: 23  Date 7/3 6/29 6/26 6/22 6/19 6/15   Visit Number 28 27 26 25 24 23   Manuals         PROM    Performed flex, abd, ER     AAROM         STM                  Neuro Re-Ed          Stand flex/scap/abd All directions 2x10 ea prior to scap hiking YTB 2x10 flex & scaption 2x10 ea direction 1lb 2x10 ea direction 1lb 2x10 ea direction 2x10 ea direction   rows  YTB 3x10 Single arm emily 4.0 3x10 Banded row and ext 30x Single arm emily 4.0 3x10    Extension emily 1.5 2x15 Single arm emily 5.0 3x10    Extension emily 1.2 3x10   No money   With yellow tband 3x10 ER walkouts YTB 10x     Ball rolls at table         Seated arm on table 90/90 ER 3x10 0lbs        GH ER/IR isos PT AA ER To 0 into 5s iso hold -   [place] 0.5 IR & ER YTB 3x10  IR YTB 20x     Supine GH ABC SL ABCs 2x    SL ABC 2x SL ABC 2x   Fxl ER/IR 3x10 3x10 2x15  2x15 2x10   SL GH ER 3x10 5s hold   3x10 1lb SL 3x10 SL 3x10   SL gh abd    3x10 1lb SL 3x10 SL 3x10   Ther Ex         pendulums 30x       Table slides   Flex + abd 30x ea w/ Yellow ball  Flex + abd 30x ea w/ Yellow ball Flex + abd 30x ea w/ Yellow ball   Elbow ROM         Wrist ROM         Standing GH PROM abd         AAROM supine flex     3x10 cane 3x10 cane   Scalene stretch         Standing AROM flex    Sh add ytb 20x     Sup cane ER     3x10 cane    Wall slides   Flx 20x with end range circles cw/ccw Flx/abd 20x with end range circles cw/ccw     Standing flexion into horiz abd and abd into horiz add  2x10 ea w/2lbs 2x15 each   2x10 each

## 2023-07-05 ENCOUNTER — OFFICE VISIT (OUTPATIENT)
Dept: PHYSICAL THERAPY | Facility: CLINIC | Age: 78
End: 2023-07-05
Payer: MEDICARE

## 2023-07-05 DIAGNOSIS — M25.511 ACUTE PAIN OF RIGHT SHOULDER: ICD-10-CM

## 2023-07-05 DIAGNOSIS — Z96.611 S/P REVERSE TOTAL SHOULDER ARTHROPLASTY, RIGHT: Primary | ICD-10-CM

## 2023-07-05 PROCEDURE — 97112 NEUROMUSCULAR REEDUCATION: CPT

## 2023-07-05 NOTE — PROGRESS NOTES
Daily Note     Today's date: 2023  Patient name: Marcin Jules  : 1945  MRN: [id number]  Referring provider: Colletta Dk*  Dx:   Encounter Diagnosis     ICD-10-CM    1. S/P reverse total shoulder arthroplasty, right  Z96.611       2. Acute pain of right shoulder  M25.511           Start Time: 1705  Stop Time: 1745  Total time in clinic (min): 40 minutes    Subjective: Pt states feeling sore globally around her shoulder and in her triceps since her last treatment session. She feels it is from the behind the back stretches but notes it is better since yesterday. Soreness only prior to the start of her treatment session. Objective: See treatment diary below      Assessment: Tolerated treatment well. Patient demonstrated fatigue post treatment, exhibited good technique with therapeutic exercises and would benefit from continued PT. Continues with difficulty going into ER & IR as well as poor posterior recruitment as she tends to shift and rotate her trunk to compensate. Moderate TC needed for corrections. Plan: Continue per plan of care. Progress treatment as tolerated.        Precautions: Reverse Total Shoulder Protocol-- No GH extension past neutral, NO behind the back movement, Hx of breast cancer  HEP: Access Code [email or web address]  KX MODIFIER  IE: 23   PN: 5/10/23  PN: 23  Date 7/5 7/3 6/29 6/26 6/22 6/19   Visit Number 29 28 27 26 25 24   Manuals         PROM     Performed flex, abd, ER    AAROM         STM                  Neuro Re-Ed          Stand flex/scap/abd All directions 15x ea no resistance All directions 2x10 ea prior to scap hiking YTB 2x10 flex & scaption 2x10 ea direction 1lb 2x10 ea direction 1lb 2x10 ea direction   rows   YTB 3x10 Single arm emily 4.0 3x10 Banded row and ext 30x Single arm emily 4.0 3x10    Extension emily 1.5 2x15   No money Seated YTB 3x10   With yellow tband 3x10 ER walkouts YTB 10x    Ball rolls at table         Seated arm on table 90/90 ER 3x10 0lbs 3x10 0lbs       GH ER/IR isos  PT AA ER To 0 into 5s iso hold -   [place] 0.5 IR & ER YTB 3x10  IR YTB 20x    Supine GH ABC SL ABCs 3x SL ABCs 2x    SL ABC 2x   Fxl ER/IR 3x10 3x10 3x10 2x15  2x15   SL GH ER 3x10, 5s hold 3x10 5s hold   3x10 1lb SL 3x10   SL gh abd     3x10 1lb SL 3x10   Supine GH horiz abd 2x10 YTB 3s hold        Ther Ex         pendulums   30x      Table slides    Flex + abd 30x ea w/ Yellow ball  Flex + abd 30x ea w/ Yellow ball   Elbow ROM         Wrist ROM         Standing GH PROM abd         AAROM supine flex      3x10 cane   Scalene stretch         Standing AROM flex     Sh add ytb 20x    Sup cane ER      3x10 cane   Wall slides    Flx 20x with end range circles cw/ccw Flx/abd 20x with end range circles cw/ccw    Standing flexion into horiz abd and abd into horiz add   2x10 ea w/2lbs 2x15 each   2x10 each

## 2023-07-11 ENCOUNTER — EVALUATION (OUTPATIENT)
Dept: PHYSICAL THERAPY | Facility: CLINIC | Age: 78
End: 2023-07-11
Payer: MEDICARE

## 2023-07-11 DIAGNOSIS — Z96.611 S/P REVERSE TOTAL SHOULDER ARTHROPLASTY, RIGHT: Primary | ICD-10-CM

## 2023-07-11 DIAGNOSIS — M25.511 ACUTE PAIN OF RIGHT SHOULDER: ICD-10-CM

## 2023-07-11 PROCEDURE — 97140 MANUAL THERAPY 1/> REGIONS: CPT

## 2023-07-11 PROCEDURE — 97110 THERAPEUTIC EXERCISES: CPT

## 2023-07-11 PROCEDURE — 97112 NEUROMUSCULAR REEDUCATION: CPT

## 2023-07-11 NOTE — PROGRESS NOTES
PT Re-Evaluation     Today's date: 2023  Patient name: Emelyn Kinsey  : 1945  MRN: [id number]  Referring provider: Karen Kent*  Dx:   Encounter Diagnosis     ICD-10-CM    1. S/P reverse total shoulder arthroplasty, right  Z96.611       2. Acute pain of right shoulder  M25.511                      Assessment  Assessment details: Pt is an 78 y.o female who presents to skilled OPPT for her 30th visit s/p R reverse total shoulder arthroplasty. Pt demonstrated continued increased R GH A/PROM with tightness at end ranges but no pain, slightly increased R GH MMT in multiple planes, fair posture, no TTP globally around the R shoulder, and poor, but improving, body mechanics. Pt continues to make steady progress since starting PT. Pt's remaining deficits are preventing her from performing ADLs, and recreational activities without compensations. Pt was re-educated on her diagnosis, prognosis, precautions/contraindications, HEP, and POC. Venkat Ojeda Pt will decrease weekly visits to 1x/week to progress to independent with HEP and progress towards remaining goals. Impairments: abnormal coordination, abnormal muscle firing, abnormal muscle tone, abnormal or restricted ROM, impaired physical strength, scapular dyskinesis, poor posture  and poor body mechanics    Symptom irritability: moderateUnderstanding of Dx/Px/POC: good   Prognosis: fair    Goals  Short Term Goals:  1) Pt will initiate and progress her HEP in 4-6 weeks. - Met  2) Pt will improve GH PROM by 50% with less than 2/10 pain to improve ADLs in 4-6 weeks. - Met  3) Pt will begin AROM exercises with less than 3/10 pain to improve ADLs and self care in 4-6 weeks. - Met    Long Term Goals:  1) Pt will be able to sleep throughout the night, 6-8 hours, with less than 2/10 pain in 8-12 weeks. - Met  2) Pt will be able to reach a cabinet at shoulder height with less than 2/10 pain in 8-12 weeks. - Met  3) Pt will be able to perform ADLs with less than 2/10 pain in 8-12 weeks. - Partially Met  4) Pt will be able to shower and clean her home with less than 3/10 pain in 8-12 weeks. - Met  5) Pt will be able to work in her garden for 15 mins with less than 2/10 pain in 8-12 weeks. - partially achieved    Plan  Patient would benefit from: skilled physical therapy and PT eval  Planned modality interventions: cryotherapy  Planned therapy interventions: activity modification, ADL retraining, joint mobilization, manual therapy, motor coordination training, neuromuscular re-education, body mechanics training, IADL retraining, patient education, postural training, coordination, self care, strengthening, stretching, therapeutic activities, therapeutic exercise, flexibility, functional ROM exercises, graded exercise and home exercise program  Frequency: 1x week  Duration in weeks: 4  Treatment plan discussed with: patient        Subjective Evaluation    History of Present Illness  Date of surgery: 3/2/2023  Mechanism of injury: surgery  Mechanism of injury: 23 Update:  Pt feels her shoulder has continued to improve. Pt reports she is using R UE around her home dishes, gardening, and house cleaning. Pt also reports she has been able to groom without limitations. Pt reports she remains most limited with reaching behind her back. Pt has still been sleeping in her recliner, however reports limited by arthritic pain of her knees more than her shoulder.     Patient Goals  Patient goals for therapy: increased motion, decreased edema, increased strength, independence with ADLs/IADLs and return to sport/leisure activities (progressing towards)  Patient goal: Pt wants to get back to gardening (partially achieved)   Pain  Current pain ratin  At best pain ratin  At worst pain ratin (tightness and sore)  Quality: tight  Aggravating factors: overhead activity and lifting  Progression: improved    Social Support  Lives with: spouse    Employment status: not working  Hand dominance: right          Objective     Static Posture     Head  Forward. Shoulders  Asymmetric shoulders, elevated and rounded. Tenderness     Right Shoulder  No tenderness in the [facility] joint, biceps tendon (proximal) and bicipital groove.      Active Range of Motion   Left Shoulder   Flexion: 142 degrees   Abduction: 135 degrees   External rotation BTH: T3     Right Shoulder   Flexion: 130 degrees   Abduction: 110 degrees   External rotation 90°: 45 degrees  External rotation BTH: T1   Internal rotation 90°: 50 degrees   Internal rotation BTB: sacrum     Right Elbow   Flexion: 145 degrees   Extension: 10 degrees   Forearm supination: 90 degrees   Forearm pronation: 90 degrees     Right Wrist   Wrist flexion: 40 degrees   Wrist extension: 70 degrees   Radial deviation: 22 degrees   Ulnar deviation: 10 degrees     Passive Range of Motion     Right Elbow   Flexion: 147 degrees   Extension: 8 degrees   Forearm supination: 90 degrees   Forearm pronation: 95 degrees     Strength/Myotome Testing     Right Shoulder     Planes of Motion   Flexion: 4-   Abduction: 3+   External rotation at 0°: 3   Internal rotation at 0°: 4-     Left Elbow   Flexion: 4+  Extension: 4+  Forearm supination: 4+  Forearm pronation: 4+    Right Elbow   Flexion: 4  Extension: 4  Forearm supination: 4-  Forearm pronation: 4-    Additional Strength Details  Within available motion                Precautions: Reverse Total Shoulder Protocol-- No GH extension past neutral, NO behind the back movement, Hx of breast cancer  HEP: Access Code [email or web address]  KX MODIFIER  IE: 4/12/23   PN: 5/10/23  PN: 6/8/23  PN: 7/11/23  Date 7/11 7/5 7/3 6/29 6/26 6/22 6/19   Visit Number 30 29 28 27 26 25 24   Manuals          PROM R shoulder     Performed flex, abd, ER    AAROM          STM                    Neuro Re-Ed           Stand flex/scap/abd 1lb 20x All directions 15x ea no resistance All directions 2x10 ea prior to scap hiking YTB 2x10 flex & scaption 2x10 ea direction 1lb 2x10 ea direction 1lb 2x10 ea direction   rows 30x blue   YTB 3x10 Single arm jia 4.0 3x10 Banded row and ext 30x Single arm jia 4.0 3x10    Extension jia 1.5 2x15   No money Seated YTB 3x10 Seated YTB 3x10   With yellow tband 3x10 ER walkouts YTB 10x    PNF D1& D2 20x ytb         Seated arm on table 90/90 ER  3x10 0lbs 3x10 0lbs       GH ER/IR isos IR ytb 30x  PT AA ER To 0 into 5s iso hold -   [place] 0.5 IR & ER YTB 3x10  IR YTB 20x    Supine GH ABC  SL ABCs 3x SL ABCs 2x    SL ABC 2x   Fxl ER/IR  3x10 3x10 3x10 2x15  2x15   SL GH ER  3x10, 5s hold 3x10 5s hold   3x10 1lb SL 3x10   SL gh abd      3x10 1lb SL 3x10   Supine GH horiz abd Seated 30x ytb 2x10 YTB 3s hold        Ther Ex          pendulums    30x      Table slides     Flex + abd 30x ea w/ Yellow ball  Flex + abd 30x ea w/ Yellow ball   Elbow ROM          Wrist ROM          Standing GH PROM abd          AAROM supine flex       3x10 cane   Scalene stretch          Standing AROM flex      Sh add ytb 20x    Sup cane ER       3x10 cane   Wall slides Wall walks 10x     Flx 20x with end range circles cw/ccw Flx/abd 20x with end range circles cw/ccw    Standing flexion into horiz abd and abd into horiz add    2x10 ea w/2lbs 2x15 each   2x10 each

## 2023-07-13 ENCOUNTER — OFFICE VISIT (OUTPATIENT)
Dept: PHYSICAL THERAPY | Facility: CLINIC | Age: 78
End: 2023-07-13
Payer: MEDICARE

## 2023-07-13 DIAGNOSIS — M25.511 ACUTE PAIN OF RIGHT SHOULDER: ICD-10-CM

## 2023-07-13 DIAGNOSIS — Z96.611 S/P REVERSE TOTAL SHOULDER ARTHROPLASTY, RIGHT: Primary | ICD-10-CM

## 2023-07-13 PROCEDURE — 97112 NEUROMUSCULAR REEDUCATION: CPT | Performed by: PHYSICAL THERAPIST

## 2023-07-13 PROCEDURE — 97110 THERAPEUTIC EXERCISES: CPT | Performed by: PHYSICAL THERAPIST

## 2023-07-13 NOTE — PROGRESS NOTES
Daily Note     Today's date: 2023  Patient name: Emelyn Kinsey  : 1945  MRN: [id number]  Referring provider: Karen Kent*  Dx:   Encounter Diagnosis     ICD-10-CM    1. S/P reverse total shoulder arthroplasty, right  Z96.611       2. Acute pain of right shoulder  M25.511           Start Time: 1703  Stop Time: 1750  Total time in clinic (min): 47 minutes    Subjective: Client arrives without reports of pain. Objective: See treatment diary below      Assessment: Tolerated treatment well. She demonstrated fatigue post treatment, exhibited good technique with therapeutic exercises and would benefit from continued PT. Continues with difficulty going into ER & IR as well as poor posterior recruitment as she tends to shift and rotate her trunk to compensate. Moderate TC needed for corrections. Plan: Continue per plan of care. Progress treatment as tolerated.        Precautions: Reverse Total Shoulder Protocol-- No GH extension past neutral, NO behind the back movement, Hx of breast cancer  HEP: Access Code [email or web address]  KX MODIFIER  IE: 23   PN: 5/10/23  PN: 23  PN: 23  Date 7/13 7/11 7/5 7/3 6/29 6/26 6/22 6/19   Visit Number  30 29 28 27 26 25 24   Manuals           PROM  R shoulder     Performed flex, abd, ER    AAROM           STM                      Neuro Re-Ed            Stand flex/scap/abd 2x15 flx and abd no weight    2x15 1lbs scaption 1lb 20x All directions 15x ea no resistance All directions 2x10 ea prior to scap hiking YTB 2x10 flex & scaption 2x10 ea direction 1lb 2x10 ea direction 1lb 2x10 ea direction   rows Single arm emily 4.2 3x10 30x blue   YTB 3x10 Single arm emily 4.0 3x10 Banded row and ext 30x Single arm emily 4.0 3x10    Extension emily 1.5 2x15   No money Seated YTB 3x10 Seated YTB 3x10 Seated YTB 3x10   With yellow tband 3x10 ER walkouts YTB 10x    PNF D1& D2 20x ytb D1& D2 20x ytb         Seated arm on table 90/90 ER   3x10 0lbs 3x10 0lbs [id number] ER/IR isos  IR ytb 30x  PT AA ER To 0 into 5s iso hold -   [place] 0.5 IR & ER YTB 3x10  IR YTB 20x    Supine GH ABC   SL ABCs 3x SL ABCs 2x    SL ABC 2x   Fxl ER/IR 3x10  3x10 3x10 3x10 2x15  2x15   SL GH ER 3x10, 5s hold  3x10, 5s hold 3x10 5s hold   3x10 1lb SL 3x10   SL gh abd 3x10, 5s hold      3x10 1lb SL 3x10   Supine GH horiz abd  Seated 30x ytb 2x10 YTB 3s hold        Ther Ex           pendulums     30x      Table slides Flex only 30x green PB     Flex + abd 30x ea w/ Yellow ball  Flex + abd 30x ea w/ Yellow ball   Elbow ROM           Wrist ROM           Standing GH PROM abd           AAROM supine flex        3x10 cane   Scalene stretch           Standing AROM flex       Sh add ytb 20x    Sup cane ER        3x10 cane   Wall slides Wall walks 20x Wall walks 10x     Flx 20x with end range circles cw/ccw Flx/abd 20x with end range circles cw/ccw    Standing flexion into horiz abd and abd into horiz add     2x10 ea w/2lbs 2x15 each   2x10 each

## 2023-07-17 DIAGNOSIS — E03.9 HYPOTHYROIDISM, UNSPECIFIED TYPE: ICD-10-CM

## 2023-07-17 RX ORDER — LEVOTHYROXINE SODIUM 88 UG/1
TABLET ORAL
Qty: 90 TABLET | Refills: 3 | Status: SHIPPED | OUTPATIENT
Start: 2023-07-17

## 2023-07-20 ENCOUNTER — OFFICE VISIT (OUTPATIENT)
Dept: PHYSICAL THERAPY | Facility: CLINIC | Age: 78
End: 2023-07-20
Payer: MEDICARE

## 2023-07-20 DIAGNOSIS — M25.511 ACUTE PAIN OF RIGHT SHOULDER: ICD-10-CM

## 2023-07-20 DIAGNOSIS — Z96.611 S/P REVERSE TOTAL SHOULDER ARTHROPLASTY, RIGHT: Primary | ICD-10-CM

## 2023-07-20 PROCEDURE — 97112 NEUROMUSCULAR REEDUCATION: CPT

## 2023-07-20 NOTE — PROGRESS NOTES
Daily Note     Today's date: 2023  Patient name: Ermelinda Ferrera  : 1945  MRN: [id number]  Referring provider: Gasper Maravilla*  Dx:   Encounter Diagnosis     ICD-10-CM    1. S/P reverse total shoulder arthroplasty, right  Z96.611       2. Acute pain of right shoulder  M25.511           Start Time: 1750  Stop Time: 1830  Total time in clinic (min): 40 minutes    Subjective: Pt reports that her shoulder feels good today, no pain prior to the start of her treatment session. Tries to use her RUE as much as possible throughout the day. No new complaints. Objective: See treatment diary below      Assessment: Tolerated treatment well. Patient demonstrated fatigue post treatment, exhibited good technique with therapeutic exercises and would benefit from continued PT      Plan: Continue per plan of care. Progress treatment as tolerated.        Precautions: Reverse Total Shoulder Protocol-- No GH extension past neutral, NO behind the back movement, Hx of breast cancer  HEP: Access Code [email or web address]  KX MODIFIER  IE: 23   PN: 5/10/23  PN: 23  PN: 23  Date 7/20 7/13 7/11 7/5 7/3 6/29 6/26 6/22   Visit Number [id number] 31 30 29 28 27 26 25   Manuals           PROM   R shoulder     Performed flex, abd, ER   AAROM           STM                      Neuro Re-Ed            Stand flex/scap/abd 1x15 no weight all 3 directions 2x15 flx and abd no weight    2x15 1lbs scaption 1lb 20x All directions 15x ea no resistance All directions 2x10 ea prior to scap hiking YTB 2x10 flex & scaption 2x10 ea direction 1lb 2x10 ea direction 1lb   rows Single arm rows & ext emily 4.2 & 1.6 respectively 3x10 ea Single arm emily 4.2 3x10 30x blue   YTB 3x10 Single arm emily 4.0 3x10 Banded row and ext 30x   No money Seated YTB 3x10 Seated YTB 3x10 Seated YTB 3x10 Seated YTB 3x10   With yellow tband 3x10 ER walkouts YTB 10x   PNF Seated D1 & D2 20x ea D1& D2 20x ytb D1& D2 20x ytb        Seated arm on table 90/90 ER 3x10 0lbs 3x10 0lbs      GH ER/IR isos   IR ytb 30x  PT AA ER To 0 into 5s iso hold -   [place] 0.5 IR & ER YTB 3x10  IR YTB 20x   Supine GH ABC 2x   SL ABCs 3x SL ABCs 2x      Fxl ER/IR 3x10 3x10  3x10 3x10 3x10 2x15    SL GH ER 3x10, w/2# 3x10, 5s hold  3x10, 5s hold 3x10 5s hold   3x10 1lb   SL gh abd 3x10, w/2# 3x10, 5s hold      3x10 1lb   Supine GH horiz abd Supine 30x YTB  Seated 30x ytb 2x10 YTB 3s hold       Ther Ex           pendulums      30x     Table slides  Flex only 30x green PB     Flex + abd 30x ea w/ Yellow ball    Elbow ROM           Wrist ROM           Standing GH PROM abd           AAROM supine flex           Scalene stretch           Standing AROM flex        Sh add ytb 20x   Sup cane ER           Wall slides  Wall walks 20x Wall walks 10x     Flx 20x with end range circles cw/ccw Flx/abd 20x with end range circles cw/ccw   Standing flexion into horiz abd and abd into horiz add      2x10 ea w/2lbs 2x15 each

## 2023-07-27 ENCOUNTER — OFFICE VISIT (OUTPATIENT)
Dept: PHYSICAL THERAPY | Facility: CLINIC | Age: 78
End: 2023-07-27
Payer: MEDICARE

## 2023-07-27 DIAGNOSIS — Z96.611 S/P REVERSE TOTAL SHOULDER ARTHROPLASTY, RIGHT: Primary | ICD-10-CM

## 2023-07-27 DIAGNOSIS — M25.511 ACUTE PAIN OF RIGHT SHOULDER: ICD-10-CM

## 2023-07-27 PROCEDURE — 97112 NEUROMUSCULAR REEDUCATION: CPT

## 2023-07-27 NOTE — PROGRESS NOTES
Daily Note     Today's date: 2023  Patient name: Geraldo Wilkins  : 1945  MRN: [id number]  Referring provider: Mariusz Schwab*  Dx:   Encounter Diagnosis     ICD-10-CM    1. S/P reverse total shoulder arthroplasty, right  Z96.611       2. Acute pain of right shoulder  M25.511           Start Time: 1745  Stop Time: 1830  Total time in clinic (min): 45 minutes    Subjective: Pt reports that her shoulder is feeling good, she is doing a lot around the house but still has difficulty with ER. Objective: See treatment diary below      Assessment: Tolerated treatment well. Patient demonstrated fatigue post treatment, exhibited good technique with therapeutic exercises and would benefit from continued PT. Pt continues to have difficulty with AROM ER away from neutral positions and continues with functional IR. Continue to progress as tolerated, anticipated discharge in the next 2 visits. Plan: Continue per plan of care. Progress treatment as tolerated.        Precautions: Reverse Total Shoulder Protocol-- No GH extension past neutral, NO behind the back movement, Hx of breast cancer  HEP: Access Code [email or web address]  KX MODIFIER  IE: 23   PN: 5/10/23  PN: 23  PN: 23  Date 7/27 7/20 7/13 7/11 7/5 7/3 6/29 6/26   Visit Number [id number] 31 30 29 28 27 26   Manuals           PROM    R shoulder       AAROM           STM                      Neuro Re-Ed            Stand flex/scap/abd 1x15 2lbs all 3 directions 1x15 no weight all 3 directions 2x15 flx and abd no weight    2x15 1lbs scaption 1lb 20x All directions 15x ea no resistance All directions 2x10 ea prior to scap hiking YTB 2x10 flex & scaption 2x10 ea direction 1lb   rows Single arm upright rows emily 1.6 3x10 Single arm rows & ext emily 4.2 & 1.6 respectively 3x10 ea Single arm emily 4.2 3x10 30x blue   YTB 3x10 Single arm emily 4.0 3x10   No money  Seated YTB 3x10 Seated YTB 3x10 Seated YTB 3x10 Seated YTB 3x10   With yellow tband 3x10   PNF At wall D2 3x10  D1 w/2lbs 3x10 Seated D1 & D2 20x ea D1& D2 20x ytb D1& D2 20x ytb       Seated arm on table 90/90 ER     3x10 0lbs 3x10 0lbs     GH ER/IR isos    IR ytb 30x  PT AA ER To 0 into 5s iso hold -   [place] 0.5 IR & ER YTB 3x10    Supine GH ABC  2x   SL ABCs 3x SL ABCs 2x     Fxl ER/IR 3x10 3x10 3x10  3x10 3x10 3x10 2x15   SL GH ER  3x10, w/2# 3x10, 5s hold  3x10, 5s hold 3x10 5s hold     SL gh abd  3x10, w/2# 3x10, 5s hold        Supine GH horiz abd  Supine 30x YTB  Seated 30x ytb 2x10 YTB 3s hold      Ther Ex           pendulums       30x    Table slides   Flex only 30x green PB     Flex + abd 30x ea w/ Yellow ball   Elbow ROM           Wrist ROM           Standing GH PROM abd           AAROM supine flex           Scalene stretch           Standing AROM flex Supine w/2lbs on cane 3x10          Sup cane ER           Cane press Supine 4lbs 3x10 B          Wall slides   Wall walks 20x Wall walks 10x     Flx 20x with end range circles cw/ccw   Standing flexion into horiz abd and abd into horiz add       2x10 ea w/2lbs 2x15 each

## 2023-08-03 ENCOUNTER — OFFICE VISIT (OUTPATIENT)
Dept: PHYSICAL THERAPY | Facility: CLINIC | Age: 78
End: 2023-08-03
Payer: MEDICARE

## 2023-08-03 DIAGNOSIS — M25.511 ACUTE PAIN OF RIGHT SHOULDER: ICD-10-CM

## 2023-08-03 DIAGNOSIS — Z96.611 S/P REVERSE TOTAL SHOULDER ARTHROPLASTY, RIGHT: Primary | ICD-10-CM

## 2023-08-03 PROCEDURE — 97112 NEUROMUSCULAR REEDUCATION: CPT | Performed by: PHYSICAL THERAPIST

## 2023-08-03 PROCEDURE — 97110 THERAPEUTIC EXERCISES: CPT | Performed by: PHYSICAL THERAPIST

## 2023-08-03 NOTE — PROGRESS NOTES
Daily Note     Today's date: 8/3/2023  Patient name: Marcin Jules  : 1945  MRN: [id number]  Referring provider: Colletta Dk*  Dx:   Encounter Diagnosis     ICD-10-CM    1. S/P reverse total shoulder arthroplasty, right  Z96.611       2. Acute pain of right shoulder  M25.511           Start Time: 1749  Stop Time: 183  Total time in clinic (min): 43 minutes    Subjective: Client denies any pain. She is on target for d/c next session prior to having cataract surgery. Objective: See treatment diary below      Assessment: Tolerated treatment well. She demonstrated fatigue post treatment, exhibited good technique with therapeutic exercises and would benefit from continued PT. Continued to challenge ER and functional IR/ER. Continue to progress as tolerated, anticipated discharge next session      Plan: Continue per plan of care. Potential discharge next visit.      Precautions: Reverse Total Shoulder Protocol-- No GH extension past neutral, NO behind the back movement, Hx of breast cancer  HEP: Access Code [email or web address]  KX MODIFIER  IE: 23   PN: 5/10/23  PN: 23  PN: 23  Date 8/3 7/27 7/20 7/13 7/11 7/5 7/3 6/29 6/26   Visit Number [id number] 30 29 28 27 26   Manuals            PROM     R shoulder       AAROM            STM                        Neuro Re-Ed             Stand flex/scap/abd 1x15 2lbs all 3 directions 1x15 2lbs all 3 directions 1x15 no weight all 3 directions 2x15 flx and abd no weight    2x15 1lbs scaption 1lb 20x All directions 15x ea no resistance All directions 2x10 ea prior to scap hiking YTB 2x10 flex & scaption 2x10 ea direction 1lb   rows Single arm upright rows emily 2 3x10 Single arm upright rows emily 1.6 3x10 Single arm rows & ext emily 4.2 & 1.6 respectively 3x10 ea Single arm emily 4.2 3x10 30x blue   YTB 3x10 Single arm emily 4.0 3x10   No money Standing  YTB 3x10  Seated YTB 3x10 Seated YTB 3x10 Seated YTB 3x10 Seated YTB 3x10   With yellow tband 3x10   PNF At wall D2 3x10  D1 w/2lbs 3x10 At wall D2 3x10  D1 w/2lbs 3x10 Seated D1 & D2 20x ea D1& D2 20x ytb D1& D2 20x ytb       Seated arm on table 90/90 ER      3x10 0lbs 3x10 0lbs     GH ER/IR isos     IR ytb 30x  PT AA ER To 0 into 5s iso hold -   [place] 0.5 IR & ER YTB 3x10    Supine GH ABC   2x   SL ABCs 3x SL ABCs 2x     Fxl ER/IR 3x10 3x10 3x10 3x10  3x10 3x10 3x10 2x15   SL GH ER   3x10, w/2# 3x10, 5s hold  3x10, 5s hold 3x10 5s hold     SL gh abd   3x10, w/2# 3x10, 5s hold        Supine GH horiz abd   Supine 30x YTB  Seated 30x ytb 2x10 YTB 3s hold      Ther Ex            pendulums        30x    Table slides Flex + abd 30x ea w/ Yellow ball   Flex only 30x green PB     Flex + abd 30x ea w/ Yellow ball   Elbow ROM            Wrist ROM            Standing GH PROM abd            AAROM supine flex            Scalene stretch            Standing AROM flex  Supine w/2lbs on cane 3x10          Sup cane ER            Cane press  Supine 4lbs 3x10 B          Wall slides Wall walks 20x (visual target to increase range)   Wall walks 20x Wall walks 10x     Flx 20x with end range circles cw/ccw   Standing flexion into horiz abd and abd into horiz add        2x10 ea w/2lbs 2x15 each

## 2023-08-10 ENCOUNTER — OFFICE VISIT (OUTPATIENT)
Dept: PHYSICAL THERAPY | Facility: CLINIC | Age: 78
End: 2023-08-10
Payer: MEDICARE

## 2023-08-10 DIAGNOSIS — Z96.611 S/P REVERSE TOTAL SHOULDER ARTHROPLASTY, RIGHT: Primary | ICD-10-CM

## 2023-08-10 DIAGNOSIS — M25.511 ACUTE PAIN OF RIGHT SHOULDER: ICD-10-CM

## 2023-08-10 PROCEDURE — 97112 NEUROMUSCULAR REEDUCATION: CPT

## 2023-08-10 PROCEDURE — 97110 THERAPEUTIC EXERCISES: CPT

## 2023-08-10 NOTE — PROGRESS NOTES
Discharge     Today's date: 8/10/2023  Patient name: Jose Mcdaniels  : 1945  MRN: [id number]  Referring provider: Jessie Feliciano*  Dx:   Encounter Diagnosis     ICD-10-CM    1. S/P reverse total shoulder arthroplasty, right  Z96.611       2. Acute pain of right shoulder  M25.511           Start Time: 1747  Stop Time:   Total time in clinic (min): 41 minutes    Subjective: Pt reports that she isn't having any shoulder pain and is doing well right now. No complaints after her last few sessions. Pt feels she is doing better functionally at home, continues to have stiffness but is doing her stretches. Pt states behind the back motions are still very tough and isn't sure if it will come back. Objective: See treatment diary below      Assessment: Tolerated treatment well. Patient demonstrated fatigue post treatment and exhibited good technique with therapeutic exercises. Pt has progressed well since her surgery, progressing through her post-op surgical protocol increasing her A/PROM, strength, posture, and mechanics. Over the last 2 months the pt has began to plateau with her ROM and had focused on more strengthening within her ROM at PT while she continued her stretches at home. Because pt has achieved most of her goals, gone through her protocol, and has plateaued she is being discharged to an updated HEP until there are changes to her status. Pt was educated on what her HEP and what to focus on moving forward. Pt had no questions at this time. Plan: Discharge to updated HEP.      Precautions: Reverse Total Shoulder Protocol-- No GH extension past neutral, NO behind the back movement, Hx of breast cancer  HEP: Access Code [email or web address]  KX MODIFIER  IE: 23   PN: 5/10/23  PN: 23  PN: 23  Date 8/10 8/3 7/27 7/20 7/13 7/11   Visit Number [id number] 30   Manuals         PROM      R shoulder   AAROM         STM                  Neuro Re-Ed          Stand flex/scap/abd  1x15 2lbs all 3 directions 1x15 2lbs all 3 directions 1x15 no weight all 3 directions 2x15 flx and abd no weight    2x15 1lbs scaption 1lb 20x   rows Sing arm 2x10 emily 2.8 Single arm upright rows emily 2 3x10 Single arm upright rows emily 1.6 3x10 Single arm rows & ext emily 4.2 & 1.6 respectively 3x10 ea Single arm emily 4.2 3x10 30x blue   No money No resistance 2x10 Standing  YTB 3x10  Seated YTB 3x10 Seated YTB 3x10 Seated YTB 3x10   PNF At wall D2 & D1 3x10 ea no weight At wall D2 3x10  D1 w/2lbs 3x10 At wall D2 3x10  D1 w/2lbs 3x10 Seated D1 & D2 20x ea D1& D2 20x ytb D1& D2 20x ytb   Seated arm on table 90/90 ER         GH ER/IR isos      IR ytb 30x   Supine GH ABC    2x     Fxl ER/IR 1x10 3x10 3x10 3x10 3x10    Ball wall circles 30x cw/ccw @ 90 flex & abd        SL GH ER    3x10, w/2# 3x10, 5s hold    SL gh abd    3x10, w/2# 3x10, 5s hold    Supine GH horiz abd    Supine 30x YTB  Seated 30x ytb   Ther Ex         pendulums         Table slides Wall slides 3x10 Flex + abd 30x ea w/ Yellow ball   Flex only 30x green PB    Elbow ROM         Wrist ROM         Standing GH PROM abd Ext 2x10        BTB str 2x10        AAROM supine flex         Scalene stretch         Standing AROM flex 10x 3 directions  Supine w/2lbs on cane 3x10      Sup cane ER         Cane press   Supine 4lbs 3x10 B      Wall slides  Wall walks 20x (visual target to increase range)   Wall walks 20x Wall walks 10x    Standing flexion into horiz abd and abd into horiz add

## 2023-11-04 LAB
BASOPHILS # BLD AUTO: 30 CELLS/UL (ref 0–200)
BASOPHILS NFR BLD AUTO: 0.8 %
EOSINOPHIL # BLD AUTO: 30 CELLS/UL (ref 15–500)
EOSINOPHIL NFR BLD AUTO: 0.8 %
ERYTHROCYTE [DISTWIDTH] IN BLOOD BY AUTOMATED COUNT: 12 % (ref 11–15)
HCT VFR BLD AUTO: 38 % (ref 35–45)
HGB BLD-MCNC: 13.1 G/DL (ref 11.7–15.5)
LYMPHOCYTES # BLD AUTO: 1302 CELLS/UL (ref 850–3900)
LYMPHOCYTES NFR BLD AUTO: 35.2 %
MCH RBC QN AUTO: 30.9 PG (ref 27–33)
MCHC RBC AUTO-ENTMCNC: 34.5 G/DL (ref 32–36)
MCV RBC AUTO: 89.6 FL (ref 80–100)
MONOCYTES # BLD AUTO: 611 CELLS/UL (ref 200–950)
MONOCYTES NFR BLD AUTO: 16.5 %
NEUTROPHILS # BLD AUTO: 1728 CELLS/UL (ref 1500–7800)
NEUTROPHILS NFR BLD AUTO: 46.7 %
PLATELET # BLD AUTO: 180 THOUSAND/UL (ref 140–400)
PMV BLD REES-ECKER: 10.1 FL (ref 7.5–12.5)
RBC # BLD AUTO: 4.24 MILLION/UL (ref 3.8–5.1)
WBC # BLD AUTO: 3.7 THOUSAND/UL (ref 3.8–10.8)

## 2023-11-16 DIAGNOSIS — R73.09 ABNORMAL BLOOD SUGAR: ICD-10-CM

## 2023-11-16 DIAGNOSIS — E03.9 ACQUIRED HYPOTHYROIDISM: ICD-10-CM

## 2023-11-16 DIAGNOSIS — E78.5 HYPERLIPIDEMIA, UNSPECIFIED HYPERLIPIDEMIA TYPE: Primary | ICD-10-CM

## 2023-12-02 LAB
ALBUMIN SERPL-MCNC: 4.8 G/DL (ref 3.6–5.1)
ALBUMIN/GLOB SERPL: 1.5 (CALC) (ref 1–2.5)
ALP SERPL-CCNC: 96 U/L (ref 37–153)
ALT SERPL-CCNC: 15 U/L (ref 6–29)
AST SERPL-CCNC: 20 U/L (ref 10–35)
BILIRUB SERPL-MCNC: 0.9 MG/DL (ref 0.2–1.2)
BUN SERPL-MCNC: 15 MG/DL (ref 7–25)
BUN/CREAT SERPL: 31 (CALC) (ref 6–22)
CALCIUM SERPL-MCNC: 10.2 MG/DL (ref 8.6–10.4)
CHLORIDE SERPL-SCNC: 105 MMOL/L (ref 98–110)
CHOLEST SERPL-MCNC: 204 MG/DL
CHOLEST/HDLC SERPL: 3.3 (CALC)
CO2 SERPL-SCNC: 28 MMOL/L (ref 20–32)
CREAT SERPL-MCNC: 0.49 MG/DL (ref 0.6–0.95)
GFR/BSA.PRED SERPLBLD CYS-BASED-ARV: 95 ML/MIN/1.73M2
GLOBULIN SER CALC-MCNC: 3.1 G/DL (CALC) (ref 1.9–3.7)
GLUCOSE SERPL-MCNC: 93 MG/DL (ref 65–99)
HDLC SERPL-MCNC: 61 MG/DL
LDLC SERPL CALC-MCNC: 124 MG/DL (CALC)
NONHDLC SERPL-MCNC: 143 MG/DL (CALC)
POTASSIUM SERPL-SCNC: 4.2 MMOL/L (ref 3.5–5.3)
PROT SERPL-MCNC: 7.9 G/DL (ref 6.1–8.1)
SODIUM SERPL-SCNC: 141 MMOL/L (ref 135–146)
T4 FREE SERPL-MCNC: 1.7 NG/DL (ref 0.8–1.8)
TRIGL SERPL-MCNC: 90 MG/DL
TSH SERPL-ACNC: 0.81 MIU/L (ref 0.4–4.5)

## 2023-12-04 ENCOUNTER — RA CDI HCC (OUTPATIENT)
Dept: OTHER | Facility: HOSPITAL | Age: 78
End: 2023-12-04

## 2023-12-08 RX ORDER — PREDNISOLONE ACETATE 10 MG/ML
SUSPENSION/ DROPS OPHTHALMIC
COMMUNITY
Start: 2023-09-09 | End: 2023-12-13

## 2023-12-11 ENCOUNTER — OFFICE VISIT (OUTPATIENT)
Dept: FAMILY MEDICINE CLINIC | Facility: CLINIC | Age: 78
End: 2023-12-11
Payer: MEDICARE

## 2023-12-11 VITALS
RESPIRATION RATE: 20 BRPM | HEIGHT: 57 IN | SYSTOLIC BLOOD PRESSURE: 150 MMHG | OXYGEN SATURATION: 93 % | BODY MASS INDEX: 33.44 KG/M2 | DIASTOLIC BLOOD PRESSURE: 90 MMHG | WEIGHT: 155 LBS | TEMPERATURE: 97.6 F | HEART RATE: 65 BPM

## 2023-12-11 DIAGNOSIS — E78.2 MIXED HYPERLIPIDEMIA: ICD-10-CM

## 2023-12-11 DIAGNOSIS — E66.01 OBESITY, MORBID (HCC): ICD-10-CM

## 2023-12-11 DIAGNOSIS — Z96.611 S/P REVERSE TOTAL SHOULDER ARTHROPLASTY, RIGHT: ICD-10-CM

## 2023-12-11 DIAGNOSIS — I10 PRIMARY HYPERTENSION: Primary | ICD-10-CM

## 2023-12-11 DIAGNOSIS — E03.9 ACQUIRED HYPOTHYROIDISM: ICD-10-CM

## 2023-12-11 PROCEDURE — 99214 OFFICE O/P EST MOD 30 MIN: CPT | Performed by: NURSE PRACTITIONER

## 2023-12-13 PROBLEM — E78.2 MIXED HYPERLIPIDEMIA: Status: ACTIVE | Noted: 2023-12-13

## 2023-12-13 NOTE — ASSESSMENT & PLAN NOTE
Pt reports improvement in symptoms overall. Continues to have some decreased range of motion of the joint. Stable.

## 2023-12-13 NOTE — PROGRESS NOTES
Assessment/Plan:    1. Primary hypertension  Assessment & Plan:  BP slightly elevated in office today. Continue medications as directed. 2. S/P reverse total shoulder arthroplasty, right  Assessment & Plan:  Pt reports improvement in symptoms overall. Continues to have some decreased range of motion of the joint. Stable. 3. Acquired hypothyroidism  Assessment & Plan:  Thyroid studies wnl. Continue levothyroxine 88mcg daily as directed. Stable, no changes. 4. Obesity, morbid ([address])  Assessment & Plan:  Encourage gentle exercise as tolerated. 5. Mixed hyperlipidemia  Assessment & Plan:  Encourage heart healthy nutrition. Recheck at annual visit. Return for Annual physical.    Subjective:      Patient ID: Sundeep Jain is a 78 y.o. female. Chief Complaint   Patient presents with    6 month recheck blood work       Velma Colbert is an 78year old female with hypertension, hypothyroidism, osteoarthritis, prediabetes, obesity and anemia, who presents to the office for a 6 month follow up. Pt reports that her right shoulder feels improved since her procedure with Dr. Katharina Weathers. Reports that she continues to have some decreased range of motion of the shoulder. No new acute complaints today. The following portions of the patient's history were reviewed and updated as appropriate: allergies, current medications, past family history, past medical history, past social history, past surgical history and problem list.    Review of Systems   Constitutional:  Negative for chills, fatigue and fever. Respiratory:  Negative for cough, chest tightness and shortness of breath. Cardiovascular:  Negative for chest pain. Musculoskeletal:  Positive for arthralgias and myalgias.          Current Outpatient Medications   Medication Sig Dispense Refill    acetaminophen (TYLENOL) 325 mg tablet Take 3 tablets (975 mg total) by mouth 2 (two) times a day  0    amLODIPine (NORVASC) 5 mg tablet Take 5 mg by mouth in the morning. CALCIUM PO Take 600 mg by mouth in the morning      cholecalciferol (VITAMIN D3) 1,000 units tablet Take 2 tablets (2,000 Units total) by mouth daily  0    cyanocobalamin (VITAMIN B-12) 1000 MCG tablet Take 1 tablet (1,000 mcg total) by mouth daily Do not start before March 10, 2023. (Patient not taking: Reported on 3/28/2023)  0    ferrous sulfate 325 (65 Fe) mg tablet Take 1 tablet (325 mg total) by mouth daily with breakfast Do not start before March 10, 2023.  0    levothyroxine 88 mcg tablet TAKE 1 TABLET(88 MCG) BY MOUTH DAILY 90 tablet 3    losartan (COZAAR) 100 MG tablet Take 25 mg by mouth daily       No current facility-administered medications for this visit. Objective:    /90 (BP Location: Left arm, Patient Position: Sitting, Cuff Size: Standard)   Pulse 65   Temp 97.6 °F (36.4 °C) (Temporal)   Resp 20   Ht 4' 9" (1.448 m)   Wt 70.3 kg (155 lb)   SpO2 93%   BMI 33.54 kg/m²        Physical Exam  Vitals reviewed. Constitutional:       General: She is not in acute distress. Appearance: She is well-developed. She is not diaphoretic. HENT:      Head: Normocephalic and atraumatic. Eyes:      General: Lids are normal.         Right eye: No discharge. Left eye: No discharge. Conjunctiva/sclera: Conjunctivae normal.   Neck:      Thyroid: No thyromegaly. Cardiovascular:      Rate and Rhythm: Normal rate and regular rhythm. Heart sounds: Normal heart sounds. Pulmonary:      Effort: Pulmonary effort is normal. No respiratory distress. Breath sounds: Normal breath sounds. No decreased breath sounds, wheezing, rhonchi or rales. Skin:     General: Skin is warm and dry. Findings: No rash. Neurological:      Mental Status: She is alert and oriented to person, place, and time. Psychiatric:         Behavior: Behavior normal.         Thought Content:  Thought content normal.         Judgment: Judgment normal. [address] Tommi Crigler

## 2024-06-15 ENCOUNTER — OFFICE VISIT (OUTPATIENT)
Dept: URGENT CARE | Facility: CLINIC | Age: 79
End: 2024-06-15
Payer: MEDICARE

## 2024-06-15 ENCOUNTER — HOSPITAL ENCOUNTER (EMERGENCY)
Facility: HOSPITAL | Age: 79
Discharge: HOME | End: 2024-06-15
Attending: EMERGENCY MEDICINE
Payer: MEDICARE

## 2024-06-15 ENCOUNTER — APPOINTMENT (EMERGENCY)
Dept: RADIOLOGY | Facility: HOSPITAL | Age: 79
End: 2024-06-15
Payer: MEDICARE

## 2024-06-15 VITALS
DIASTOLIC BLOOD PRESSURE: 85 MMHG | HEART RATE: 70 BPM | OXYGEN SATURATION: 96 % | TEMPERATURE: 97.7 F | SYSTOLIC BLOOD PRESSURE: 155 MMHG | RESPIRATION RATE: 16 BRPM

## 2024-06-15 VITALS
BODY MASS INDEX: 34.71 KG/M2 | SYSTOLIC BLOOD PRESSURE: 152 MMHG | HEART RATE: 88 BPM | OXYGEN SATURATION: 98 % | RESPIRATION RATE: 12 BRPM | WEIGHT: 160.4 LBS | DIASTOLIC BLOOD PRESSURE: 82 MMHG | TEMPERATURE: 97.3 F

## 2024-06-15 DIAGNOSIS — R44.0 AUDITORY HALLUCINATIONS: Primary | ICD-10-CM

## 2024-06-15 DIAGNOSIS — R41.0 CONFUSION: Primary | ICD-10-CM

## 2024-06-15 LAB
2HR DELTA HS TROPONIN: 2 NG/L
ALBUMIN SERPL BCP-MCNC: 4.5 G/DL (ref 3.5–5)
ALP SERPL-CCNC: 78 U/L (ref 34–104)
ALT SERPL W P-5'-P-CCNC: 12 U/L (ref 7–52)
ANION GAP SERPL CALCULATED.3IONS-SCNC: 9 MMOL/L (ref 4–13)
APTT PPP: 38 SECONDS (ref 23–37)
AST SERPL W P-5'-P-CCNC: 20 U/L (ref 13–39)
BACTERIA UR QL AUTO: NORMAL /HPF
BASOPHILS # BLD AUTO: 0.04 THOUSANDS/ÂΜL (ref 0–0.1)
BASOPHILS NFR BLD AUTO: 1 % (ref 0–1)
BILIRUB SERPL-MCNC: 1.1 MG/DL (ref 0.2–1)
BILIRUB UR QL STRIP: NEGATIVE
BNP SERPL-MCNC: 46 PG/ML (ref 0–100)
BUN SERPL-MCNC: 13 MG/DL (ref 5–25)
CALCIUM SERPL-MCNC: 9.7 MG/DL (ref 8.4–10.2)
CARDIAC TROPONIN I PNL SERPL HS: 10 NG/L
CARDIAC TROPONIN I PNL SERPL HS: 8 NG/L
CHLORIDE SERPL-SCNC: 104 MMOL/L (ref 96–108)
CLARITY UR: CLEAR
CO2 SERPL-SCNC: 24 MMOL/L (ref 21–32)
COLOR UR: COLORLESS
CREAT SERPL-MCNC: 0.54 MG/DL (ref 0.6–1.3)
EOSINOPHIL # BLD AUTO: 0.03 THOUSAND/ÂΜL (ref 0–0.61)
EOSINOPHIL NFR BLD AUTO: 1 % (ref 0–6)
ERYTHROCYTE [DISTWIDTH] IN BLOOD BY AUTOMATED COUNT: 12.1 % (ref 11.6–15.1)
GFR SERPL CREATININE-BSD FRML MDRD: 88 ML/MIN/1.73SQ M
GLUCOSE SERPL-MCNC: 93 MG/DL (ref 65–140)
GLUCOSE UR STRIP-MCNC: NEGATIVE MG/DL
HCT VFR BLD AUTO: 38.2 % (ref 34.8–46.1)
HGB BLD-MCNC: 13 G/DL (ref 11.5–15.4)
HGB UR QL STRIP.AUTO: ABNORMAL
IMM GRANULOCYTES # BLD AUTO: 0.01 THOUSAND/UL (ref 0–0.2)
IMM GRANULOCYTES NFR BLD AUTO: 0 % (ref 0–2)
INR PPP: 0.99 (ref 0.84–1.19)
KETONES UR STRIP-MCNC: NEGATIVE MG/DL
LEUKOCYTE ESTERASE UR QL STRIP: NEGATIVE
LYMPHOCYTES # BLD AUTO: 1.46 THOUSANDS/ÂΜL (ref 0.6–4.47)
LYMPHOCYTES NFR BLD AUTO: 27 % (ref 14–44)
MAGNESIUM SERPL-MCNC: 1.9 MG/DL (ref 1.9–2.7)
MCH RBC QN AUTO: 30.5 PG (ref 26.8–34.3)
MCHC RBC AUTO-ENTMCNC: 34 G/DL (ref 31.4–37.4)
MCV RBC AUTO: 90 FL (ref 82–98)
MONOCYTES # BLD AUTO: 0.42 THOUSAND/ÂΜL (ref 0.17–1.22)
MONOCYTES NFR BLD AUTO: 8 % (ref 4–12)
NEUTROPHILS # BLD AUTO: 3.39 THOUSANDS/ÂΜL (ref 1.85–7.62)
NEUTS SEG NFR BLD AUTO: 63 % (ref 43–75)
NITRITE UR QL STRIP: NEGATIVE
NON-SQ EPI CELLS URNS QL MICRO: NORMAL /HPF
NRBC BLD AUTO-RTO: 0 /100 WBCS
PH UR STRIP.AUTO: 7 [PH]
PLATELET # BLD AUTO: 223 THOUSANDS/UL (ref 149–390)
PMV BLD AUTO: 9.3 FL (ref 8.9–12.7)
POTASSIUM SERPL-SCNC: 3.6 MMOL/L (ref 3.5–5.3)
PROT SERPL-MCNC: 7.6 G/DL (ref 6.4–8.4)
PROT UR STRIP-MCNC: ABNORMAL MG/DL
PROTHROMBIN TIME: 13.3 SECONDS (ref 11.6–14.5)
RBC # BLD AUTO: 4.26 MILLION/UL (ref 3.81–5.12)
RBC #/AREA URNS AUTO: NORMAL /HPF
SL AMB  POCT GLUCOSE, UA: NEGATIVE
SL AMB LEUKOCYTE ESTERASE,UA: ABNORMAL
SL AMB POCT BILIRUBIN,UA: NEGATIVE
SL AMB POCT BLOOD,UA: ABNORMAL
SL AMB POCT CLARITY,UA: CLEAR
SL AMB POCT COLOR,UA: YELLOW
SL AMB POCT KETONES,UA: NEGATIVE
SL AMB POCT NITRITE,UA: NEGATIVE
SL AMB POCT PH,UA: 6
SL AMB POCT SPECIFIC GRAVITY,UA: 1.01
SL AMB POCT URINE PROTEIN: ABNORMAL
SL AMB POCT UROBILINOGEN: 0.2
SODIUM SERPL-SCNC: 137 MMOL/L (ref 135–147)
SP GR UR STRIP.AUTO: <1.005 (ref 1–1.03)
UROBILINOGEN UR STRIP-ACNC: <2 MG/DL
WBC # BLD AUTO: 5.35 THOUSAND/UL (ref 4.31–10.16)
WBC #/AREA URNS AUTO: NORMAL /HPF

## 2024-06-15 PROCEDURE — 96360 HYDRATION IV INFUSION INIT: CPT

## 2024-06-15 PROCEDURE — 85730 THROMBOPLASTIN TIME PARTIAL: CPT | Performed by: EMERGENCY MEDICINE

## 2024-06-15 PROCEDURE — 80053 COMPREHEN METABOLIC PANEL: CPT | Performed by: EMERGENCY MEDICINE

## 2024-06-15 PROCEDURE — 70450 CT HEAD/BRAIN W/O DYE: CPT

## 2024-06-15 PROCEDURE — 85610 PROTHROMBIN TIME: CPT | Performed by: EMERGENCY MEDICINE

## 2024-06-15 PROCEDURE — 36415 COLL VENOUS BLD VENIPUNCTURE: CPT | Performed by: EMERGENCY MEDICINE

## 2024-06-15 PROCEDURE — 85025 COMPLETE CBC W/AUTO DIFF WBC: CPT | Performed by: EMERGENCY MEDICINE

## 2024-06-15 PROCEDURE — 83735 ASSAY OF MAGNESIUM: CPT | Performed by: EMERGENCY MEDICINE

## 2024-06-15 PROCEDURE — 83880 ASSAY OF NATRIURETIC PEPTIDE: CPT | Performed by: EMERGENCY MEDICINE

## 2024-06-15 PROCEDURE — 81002 URINALYSIS NONAUTO W/O SCOPE: CPT | Performed by: PHYSICIAN ASSISTANT

## 2024-06-15 PROCEDURE — 81001 URINALYSIS AUTO W/SCOPE: CPT

## 2024-06-15 PROCEDURE — 99285 EMERGENCY DEPT VISIT HI MDM: CPT

## 2024-06-15 PROCEDURE — 96361 HYDRATE IV INFUSION ADD-ON: CPT

## 2024-06-15 PROCEDURE — 71045 X-RAY EXAM CHEST 1 VIEW: CPT

## 2024-06-15 PROCEDURE — 84484 ASSAY OF TROPONIN QUANT: CPT | Performed by: EMERGENCY MEDICINE

## 2024-06-15 PROCEDURE — 99203 OFFICE O/P NEW LOW 30 MIN: CPT | Performed by: PHYSICIAN ASSISTANT

## 2024-06-15 PROCEDURE — 99284 EMERGENCY DEPT VISIT MOD MDM: CPT | Performed by: EMERGENCY MEDICINE

## 2024-06-15 RX ORDER — AMOXICILLIN 500 MG/1
CAPSULE ORAL
COMMUNITY
Start: 2024-06-11

## 2024-06-15 RX ADMIN — SODIUM CHLORIDE 1000 ML: 0.9 INJECTION, SOLUTION INTRAVENOUS at 13:33

## 2024-06-15 NOTE — ED NOTES
Patient ambulated out of the ED with her family, AAOx4 resp even and unlabored with no S$S of distress.       Eldon Navarrete RN  06/15/24 1060

## 2024-06-15 NOTE — PROGRESS NOTES
"  [facility]        NAME: Rachelle Fan is a 79 y.o. female  : 1945    MRN: [id number]  DATE: Nella 15, 2024  TIME: 12:37 PM    Assessment and Plan   Confusion [R41.0]  1. Confusion  POCT urine dip    Transfer to other facility        Dip with moderate blood but only trace leuks and no nitrates. Not convincing enough for me to treat for UTI without further labs. Recommend ED for further eval and management. Discussed this with  who states he will drive her now.    Patient Instructions       Follow up with PCP in 3-5 days.  Proceed to  ER if symptoms worsen.    If tests are performed, our office will contact you with results only if changes need to made to the care plan discussed with you at the visit. You can review your full results on [facility].    Chief Complaint     Chief Complaint   Patient presents with    Confusion     Reports hearing \"voices\" or her name called when no one is there x 3-4 days. States she has not been sleeping well and feels it is possible she may have a UTI. Denies urinary pain.          History of Present Illness       Patient is an 79-year-old female with PMH breast cancer, hypertension, hyperlipidemia, thyroid disease presenting with confusion x 2 to 3 days.  States she is hearing voices calling her name, states they are voices from [place].  Has never had anything like this before.  States she has not been sleeping well the last few nights because of the voices.  Took a UTI test at home which indicated she may have a UTI.  She denies fevers, weakness, headache, dizziness, chest pain, dysuria, frequency, hematuria.        Review of Systems   Review of Systems   Constitutional:  Negative for chills, diaphoresis and fever.   Respiratory:  Negative for shortness of breath.    Cardiovascular:  Negative for chest pain.   Gastrointestinal:  Negative for abdominal pain, nausea and vomiting.   Genitourinary:  Negative for dysuria, flank pain, frequency, hematuria " and urgency.   Musculoskeletal:  Negative for back pain and myalgias.   Psychiatric/Behavioral:  Positive for confusion.          Current Medications       Current Outpatient Medications:     acetaminophen (TYLENOL) 325 mg tablet, Take 3 tablets (975 mg total) by mouth 2 (two) times a day, Disp: , Rfl: 0    amLODIPine (NORVASC) 5 mg tablet, Take 5 mg by mouth in the morning., Disp: , Rfl:     CALCIUM PO, Take 600 mg by mouth in the morning, Disp: , Rfl:     cholecalciferol (VITAMIN D3) 1,000 units tablet, Take 2 tablets (2,000 Units total) by mouth daily, Disp: , Rfl: 0    cyanocobalamin (VITAMIN B-12) 1000 MCG tablet, Take 1 tablet (1,000 mcg total) by mouth daily Do not start before March 10, 2023., Disp: , Rfl: 0    levothyroxine 88 mcg tablet, TAKE 1 TABLET(88 MCG) BY MOUTH DAILY, Disp: 90 tablet, Rfl: 3    losartan (COZAAR) 100 MG tablet, Take 25 mg by mouth daily, Disp: , Rfl:     amoxicillin (AMOXIL) 500 mg capsule, TAKE FOUR CAPSULES BY MOUTH ONE HOUR BEFORE APPOINTMENT (Patient not taking: Reported on 6/15/2024), Disp: , Rfl:     ferrous sulfate 325 (65 Fe) mg tablet, Take 1 tablet (325 mg total) by mouth daily with breakfast Do not start before March 10, 2023. (Patient not taking: Reported on 6/15/2024), Disp: , Rfl: 0    Current Allergies     Allergies as of 06/15/2024 - Reviewed 06/15/2024   Allergen Reaction Noted    Zoledronic acid Edema and Swelling 11/04/2016            The following portions of the patient's history were reviewed and updated as appropriate: allergies, current medications, past family history, past medical history, past social history, past surgical history and problem list.     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 (HCC)        Past Surgical History:   Procedure Laterality Date    BREAST LUMPECTOMY Right     HYSTERECTOMY      partial - age  37    MS ARTHROPLASTY GLENOHUMERAL JOINT TOTAL SHOULDER Right 03/02/2023    Procedure: Reverse Total Shoulder Arthroplasty - SAME DAY DISCHARGE;  Surgeon: Dennys Atkins MD;  Location: [facility];  Service: Orthopedics    SHOULDER OPEN ROTATOR CUFF REPAIR Right 03/13/2023       Family History   Problem Relation Age of Onset    Other Mother         DJD, cervical    Diabetes Father         DM    Autism Son     Substance Abuse Neg Hx     Mental illness Neg Hx          Medications have been verified.        Objective   /82   Pulse 88   Temp (!) 97.3 °F (36.3 °C) (Temporal)   Resp 12   Wt 72.8 kg (160 lb 6.4 oz)   SpO2 98%   BMI 34.71 kg/m²        Physical Exam     Physical Exam  Vitals and nursing note reviewed.   Constitutional:       General: She is not in acute distress.     Appearance: Normal appearance. She is not ill-appearing.   HENT:      Head: Normocephalic and atraumatic.   Cardiovascular:      Rate and Rhythm: Normal rate and regular rhythm.      Heart sounds: Normal heart sounds.   Pulmonary:      Effort: Pulmonary effort is normal. No respiratory distress.      Breath sounds: Normal breath sounds. No wheezing, rhonchi or rales.   Abdominal:      General: Abdomen is flat.      Palpations: Abdomen is soft.      Tenderness: There is no abdominal tenderness. There is no right CVA tenderness, left CVA tenderness or guarding.   Skin:     General: Skin is warm and dry.      Capillary Refill: Capillary refill takes less than 2 seconds.   Neurological:      Mental Status: She is alert and oriented to person, place, and time.   Psychiatric:         Behavior: Behavior normal.
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
## 2024-06-19 ENCOUNTER — RA CDI HCC (OUTPATIENT)
Dept: OTHER | Facility: HOSPITAL | Age: 79
End: 2024-06-19

## 2024-06-19 ENCOUNTER — TELEPHONE (OUTPATIENT)
Dept: FAMILY MEDICINE CLINIC | Facility: CLINIC | Age: 79
End: 2024-06-19

## 2024-06-19 NOTE — TELEPHONE ENCOUNTER
"Pt  calling in again to check on status. States that he needs a \"very strong sleeping pill prescribed today\". Advised that pt would need to be seen in office. Pt continues to experience same symptoms as ER visit on 6/15. Please have provider review and call back spouse.   "

## 2024-06-19 NOTE — TELEPHONE ENCOUNTER
I caught Roma on the way out of the office.  She asked for me to call Jasvir and inform him that she is not comfortable with sending in something at this point.  She will review her chart more when she gets home.    Spoke to Jasvir and relayed to him Roma's verbal message.  He was a little disappointed because he did not want to go through another night of her being up and screaming.

## 2024-06-19 NOTE — TELEPHONE ENCOUNTER
Benigno called and is worried about Rachelle    She went to Urgent care on 6/15 and then to the ER    She is not sleeping.  Benigno thinks it is because of she is worried about the upcoming surgery. See ER notes from REGAN Thakkar    Benigno stated that she is hearing voices.  He also thinks that she may be sleep deprived.  She has an appt next for for surgical clearance.    They tried melatonin.  It is not working.  She is up all night. He was wondering if there was something else she can have to sleep.  She has has high bp and can't take benadryl or an antihistamine     Please advise    [facility] in [place]

## 2024-06-20 NOTE — TELEPHONE ENCOUNTER
FYI: Pt's  wanted an update from PCP in regards to pt. Pt's spouse was transferred to [facility] office who was extremely helpful and assisted.

## 2024-06-21 NOTE — TELEPHONE ENCOUNTER
I do not want to prescribe anything for her at this time due to her hallucinations. Sleep aides will this worse. We can discuss further at her visit. If symptoms are worsening, come in sooner.

## 2024-06-21 NOTE — TELEPHONE ENCOUNTER
Called Benigno and had to leave a message on his cell number relaying Roma's message.  Ok to put him through to me if he calls back

## 2024-06-21 NOTE — TELEPHONE ENCOUNTER
Benigno called back and I relayed Roma's response to him.  He stated that they can talk about it at her surgical clearance

## 2024-06-26 ENCOUNTER — CONSULT (OUTPATIENT)
Dept: FAMILY MEDICINE CLINIC | Facility: CLINIC | Age: 79
End: 2024-06-26
Payer: MEDICARE

## 2024-06-26 VITALS
SYSTOLIC BLOOD PRESSURE: 118 MMHG | RESPIRATION RATE: 18 BRPM | TEMPERATURE: 95.7 F | OXYGEN SATURATION: 95 % | DIASTOLIC BLOOD PRESSURE: 72 MMHG | HEART RATE: 86 BPM | HEIGHT: 57 IN | BODY MASS INDEX: 33.22 KG/M2 | WEIGHT: 154 LBS

## 2024-06-26 DIAGNOSIS — Z85.3 HISTORY OF BREAST CANCER: ICD-10-CM

## 2024-06-26 DIAGNOSIS — Z01.818 PRE-OPERATIVE CLEARANCE: Primary | ICD-10-CM

## 2024-06-26 DIAGNOSIS — E66.01 OBESITY, MORBID (HCC): ICD-10-CM

## 2024-06-26 DIAGNOSIS — R44.0 AUDITORY HALLUCINATIONS: ICD-10-CM

## 2024-06-26 DIAGNOSIS — Z96.611 PRESENCE OF RIGHT ARTIFICIAL SHOULDER JOINT: ICD-10-CM

## 2024-06-26 DIAGNOSIS — E03.9 ACQUIRED HYPOTHYROIDISM: ICD-10-CM

## 2024-06-26 DIAGNOSIS — M17.0 BILATERAL PRIMARY OSTEOARTHRITIS OF KNEE: ICD-10-CM

## 2024-06-26 DIAGNOSIS — I10 PRIMARY HYPERTENSION: ICD-10-CM

## 2024-06-26 PROBLEM — M79.605 BILATERAL LEG PAIN: Status: ACTIVE | Noted: 2024-05-16

## 2024-06-26 PROBLEM — M71.21 BAKER'S CYST, RIGHT: Status: ACTIVE | Noted: 2024-05-16

## 2024-06-26 PROBLEM — M79.604 BILATERAL LEG PAIN: Status: ACTIVE | Noted: 2024-05-16

## 2024-06-26 PROCEDURE — 99214 OFFICE O/P EST MOD 30 MIN: CPT | Performed by: NURSE PRACTITIONER

## 2024-06-26 PROCEDURE — G2211 COMPLEX E/M VISIT ADD ON: HCPCS | Performed by: NURSE PRACTITIONER

## 2024-06-26 RX ORDER — RISPERIDONE 0.5 MG/1
0.5 TABLET ORAL DAILY
COMMUNITY
Start: 2024-06-24

## 2024-06-26 NOTE — PROGRESS NOTES
"Assessment/Plan:    1. Pre-operative clearance  Assessment & Plan:  Pt is scheduled for left knee replacement with [facility].   Reviewed labs, ECG, PMH and acute problems.  Pt is considered intermediate to high risk for surgery and is medically cleared to have scheduled procedure.   Discussed with pt and  that her risk of hallucinations could be increased with anesthesia and surgery. Verbalized understanding. Following up with psych for management.     2. Bilateral primary osteoarthritis of knee  3. Obesity, morbid (HCC)  4. Auditory hallucinations  -     Ambulatory referral to Psych Services; Future  -     Ambulatory Referral to Speech Therapy; Future  -     POCT urine dip auto non-scope  5. Presence of right artificial shoulder joint  6. History of breast cancer  7. Primary hypertension  Assessment & Plan:  BP wnl in office today. Stable, no issues.   8. Acquired hypothyroidism        Reviewed recent ER notes as well r/t auditory hallucinations.     Return in about 3 months (around 9/26/2024) for Recheck.    Subjective:      Patient ID: Rachelle Fan is a 79 y.o. female.    Chief Complaint   Patient presents with    Pre-op Exam       Rachelle is an 79 year old female with hypertension, hyperlipidemia, history of breast cancer, hypothyroidism, pre-diabetes, obesity and osteoarthritis who presents to the office for a preoperative clearance. Pt is scheduled for left total knee replacement with Dr. Whitehead through [facility]. Pt reports auditory hallucinations including her brother and youngest daughter speaking to her. Pt states that \"my brother is speaking right now, I can hear them. He's in senior care. Calling me bad words and the he is going to kill me.\" Pt reports that the voices are saying they want to kill her child with special needs. Denies fever, chills, chest pain, shortness of breath. Pt presents as a good historian. States that she has had multiple urine screens r/t her above specified " symptoms which have all been negative.         The following portions of the patient's history were reviewed and updated as appropriate: allergies, current medications, past family history, past medical history, past social history, past surgical history and problem list.    Review of Systems   Constitutional:  Negative for diaphoresis, fatigue and fever.   HENT:  Negative for ear pain and hearing loss.    Eyes:  Negative for pain and visual disturbance.   Respiratory:  Negative for chest tightness and shortness of breath.    Cardiovascular:  Negative for chest pain, palpitations and leg swelling.   Gastrointestinal:  Negative for abdominal pain, constipation and diarrhea.   Genitourinary:  Negative for difficulty urinating.   Musculoskeletal:  Positive for arthralgias. Negative for myalgias.   Skin:  Negative for rash.   Neurological:  Negative for dizziness, numbness and headaches.   Psychiatric/Behavioral:  Positive for hallucinations. Negative for sleep disturbance.          Current Outpatient Medications   Medication Sig Dispense Refill    acetaminophen (TYLENOL) 325 mg tablet Take 3 tablets (975 mg total) by mouth 2 (two) times a day  0    amLODIPine (NORVASC) 5 mg tablet Take 5 mg by mouth in the morning.      amoxicillin (AMOXIL) 500 mg capsule       CALCIUM PO Take 600 mg by mouth in the morning      cholecalciferol (VITAMIN D3) 1,000 units tablet Take 2 tablets (2,000 Units total) by mouth daily  0    cyanocobalamin (VITAMIN B-12) 1000 MCG tablet Take 1 tablet (1,000 mcg total) by mouth daily Do not start before March 10, 2023.  0    levothyroxine 88 mcg tablet TAKE 1 TABLET(88 MCG) BY MOUTH DAILY 90 tablet 3    losartan (COZAAR) 100 MG tablet Take 25 mg by mouth daily      risperiDONE (RisperDAL) 0.5 mg tablet Take 0.5 mg by mouth daily       No current facility-administered medications for this visit.       Objective:    /72 (BP Location: Left arm, Patient Position: Sitting, Cuff Size: Large)    "Pulse 86   Temp (!) 95.7 °F (35.4 °C) (Temporal)   Resp 18   Ht 4' 9\" (1.448 m)   Wt 69.9 kg (154 lb)   SpO2 95%   BMI 33.33 kg/m²        Physical Exam  Vitals reviewed.   Constitutional:       General: She is not in acute distress.     Appearance: She is well-developed. She is not diaphoretic.   HENT:      Head: Normocephalic and atraumatic.   Eyes:      General: Lids are normal.         Right eye: No discharge.         Left eye: No discharge.      Conjunctiva/sclera: Conjunctivae normal.   Neck:      Thyroid: No thyromegaly.   Cardiovascular:      Rate and Rhythm: Normal rate and regular rhythm.      Heart sounds: Normal heart sounds.   Pulmonary:      Effort: Pulmonary effort is normal. No respiratory distress.      Breath sounds: Normal breath sounds. No decreased breath sounds, wheezing, rhonchi or rales.   Musculoskeletal:      Cervical back: Normal range of motion and neck supple.   Lymphadenopathy:      Cervical: No cervical adenopathy.   Skin:     General: Skin is warm and dry.      Findings: No rash.   Neurological:      Mental Status: She is alert and oriented to person, place, and time.   Psychiatric:         Behavior: Behavior normal.         Thought Content: Thought content normal.         Judgment: Judgment normal.                YOANA Beltre  "

## 2024-07-01 ENCOUNTER — EVALUATION (OUTPATIENT)
Dept: PHYSICAL THERAPY | Facility: CLINIC | Age: 79
End: 2024-07-01
Payer: MEDICARE

## 2024-07-01 DIAGNOSIS — M17.12 ARTHRITIS OF LEFT KNEE: ICD-10-CM

## 2024-07-01 DIAGNOSIS — M25.562 CHRONIC PAIN OF LEFT KNEE: Primary | ICD-10-CM

## 2024-07-01 DIAGNOSIS — G89.29 CHRONIC PAIN OF LEFT KNEE: Primary | ICD-10-CM

## 2024-07-01 PROCEDURE — 97161 PT EVAL LOW COMPLEX 20 MIN: CPT

## 2024-07-01 PROCEDURE — 97110 THERAPEUTIC EXERCISES: CPT

## 2024-07-01 NOTE — PROGRESS NOTES
PT Evaluation     Today's date: 2024  Patient name: Rachelle Fan  : 1945  MRN: [id number]  Referring provider: Santana Whitehead DO  Dx:   Encounter Diagnosis     ICD-10-CM    1. Chronic pain of left knee  M25.562     G89.29       2. Arthritis of left knee  M17.12           Start Time: 1706  Stop Time: 1745  Total time in clinic (min): 39 minutes    Assessment  Impairments: abnormal coordination, abnormal gait, abnormal or restricted ROM, abnormal movement, activity intolerance, impaired balance, impaired physical strength, lacks appropriate home exercise program, pain with function, safety issue, poor posture , poor body mechanics, unable to perform ADL, participation limitations, activity limitations and endurance  Symptom irritability: moderate    Assessment details: Pt is an 79 y.o female who presents to skilled physical therapy with complaints of L knee pain with a planned surgery date for TKA on 24. Pt is attending PT for prehab before her surgery. Pt presents with an asymmetric and antalgic gait pattern, altered functional mechanics, decreased L knee ROM, decreased L knee strength, and balance impairments. Pt's hx and clinical findings are consistent with the referring diagnosis of L knee OA. Pt was educated on her diagnosis, prognosis, POC, HEP, as well as the signs and symptoms of infection and DVT. Pt's pain and deficits are preventing her from performing self care, ADLs, and recreational activities without compensations. Pt would benefit from skilled physical therapy to reduce her pain, address her deficits, and prepare her for her surgery in a few weeks.   Understanding of Dx/Px/POC: good     Prognosis: good    Goals  Short Term Goals:  1) Pt will initiate and progress her HEP in 3-4 weeks.  2) Pt will improve her knee MMT by 1 to improve her ADLs in 3-4 weeks.  3) Pt will improve her knee extension ROM to 0 to improve her gait in 3-4 weeks.    Long Term Goals:  1) Pt will be able to  go up and down 15 steps reciprocally with less than 3/10 pain in 6-8 weeks.  2) Pt will be able to ambulate for at least 10-15 mins without an AD an less than 3/10 pain in 6-8 weeks.  3) Pt will be able to perform her ADLs with less than 3/10 pain in 6-8 weeks.     Plan  Patient would benefit from: PT eval and skilled physical therapy  Planned modality interventions: cryotherapy    Planned therapy interventions: activity modification, ADL retraining, joint mobilization, manual therapy, balance, balance/weight bearing training, body mechanics training, motor coordination training, neuromuscular re-education, coordination, patient/caregiver education, postural training, self care, strengthening, stretching, therapeutic activities, therapeutic exercise, flexibility, functional ROM exercises, gait training, graded exercise and home exercise program    Frequency: 2-3x/week.  Duration in weeks: 8  Plan of Care beginning date: 2024  Plan of Care expiration date: 2024  Treatment plan discussed with: patient and family        Subjective Evaluation    History of Present Illness  Mechanism of injury: Pt was sick a few weeks ago, she was on several medications. Pt's medication was for her nervousness, she states the medication is making her feel tired. Pt feels like she is always being watched. Pt states falling recently, she was going down the steps, and fell at the last step and she hit her head. Pt is getting her knee surgery 24, L TKA. Pt is having increased pain and difficulty moving. Pt had difficulty going up and down steps. Pt has pain in both knees, but they are going to focus on the L knee first. Pt denies numbness or tingling.   Patient Goals  Patient goals for therapy: decreased pain, improved balance, increased motion, return to sport/leisure activities, independence with ADLs/IADLs and increased strength    Pain  Current pain ratin  At best pain ratin  At worst pain ratin  Location:  medial left knee  Quality: tight, dull ache and sharp  Aggravating factors: stair climbing and walking  Progression: worsening    Social Support  Steps to enter house: yes  Stairs in house: yes   Lives in: multiple-level home  Lives with: adult children    Employment status: not working      Objective     Tenderness   Left Knee   Tenderness in the medial joint line and medial patella.     Active Range of Motion   Left Knee   Flexion: 108 degrees with pain  Extension: -5 degrees with pain    Right Knee   Flexion: 106 degrees   Extension: -5 degrees with pain    Passive Range of Motion   Left Knee   Flexion: 112 degrees with pain  Extension: -2 degrees with pain    Right Knee   Flexion: 115 degrees   Extension: -3 degrees     Strength/Myotome Testing     Left Knee   Flexion: 3+  Extension: 3+    Right Knee   Flexion: 3+  Extension: 3+    Additional Strength Details  Pain with extension L knee    Ambulation   Weight-Bearing Status   Weight-Bearing Status (Left): full weight bearing   Weight-Bearing Status (Right): full weight-bearing    Assistive device used: none    Observational Gait   Gait: antalgic and asymmetric   Increased right stance time and left swing time. Decreased walking speed, stride length, left stance time, right swing time, left step length and right step length.   Left foot contact pattern: foot flat  Right foot contact pattern: foot flat  Left arm swing: decreased  Right arm swing: decreased  Base of support: decreased    Functional Assessment      Squat    Pain, left valgus, left tibial anterior translation beyond toes, right valgus and right tibial anterior translation beyond toes.     Comments  6MWT: Not Tested  TUG: Not Tested  5xSTS: Not Tested                  Insurance:  AMA/CMS Eval/ Re-eval Auth #/ Referral # Total units or visits Start date  Expiration date KX? Visit limitation?  PT only or  PT+OT? Co-Insurance   CMS 7/1/24 7/1/24   BOMN                   POC Start Date POC Expiration  Date Signed POC?   7/1/24 8/26/24 pending      Date               Visits/Units:  Used               Authed:  Remaining                      Precautions: HTN, hx of cancer  HEP: Access Code [email or web address]    Date     7/1/24   Visit Number     1 (IE)   Manuals                                        Neuro Re-Ed                                                                 Ther Ex                                                                        Ther Activity                        Gait Training                        Modalities

## 2024-07-01 NOTE — LETTER
2024    Santana Whitehead DO  [address] [address]    Patient: Rachelle Fan   YOB: 1945   Date of Visit: 2024     Encounter Diagnosis     ICD-10-CM    1. Chronic pain of left knee  M25.562     G89.29       2. Arthritis of left knee  M17.12           Dear Dr. Whitehead:    Thank you for your recent referral of Rachelle Fan. Please review the attached evaluation summary from Rachelle's recent visit.     Please verify that you agree with the plan of care by signing the attached order.     If you have any questions or concerns, please do not hesitate to call.     I sincerely appreciate the opportunity to share in the care of one of your patients and hope to have another opportunity to work with you in the near future.       Sincerely,    Dick Butler, PT      Referring Provider:      I certify that I have read the below Plan of Care and certify the need for these services furnished under this plan of treatment while under my care.                    Santana Whitehead DO  [address] [address]  Via Fax: [phone]          PT Evaluation     Today's date: 2024  Patient name: Rachelle Fan  : 1945  MRN: [id number]  Referring provider: Santana Whitehead DO  Dx:   Encounter Diagnosis     ICD-10-CM    1. Chronic pain of left knee  M25.562     G89.29       2. Arthritis of left knee  M17.12           Start Time: 1706  Stop Time: 1745  Total time in clinic (min): 39 minutes    Assessment  Impairments: abnormal coordination, abnormal gait, abnormal or restricted ROM, abnormal movement, activity intolerance, impaired balance, impaired physical strength, lacks appropriate home exercise program, pain with function, safety issue, poor posture , poor body mechanics, unable to perform ADL, participation limitations, activity limitations and endurance  Symptom irritability: moderate    Assessment details: Pt is an 79 y.o female who  presents to skilled physical therapy with complaints of L knee pain with a planned surgery date for TKA on 7/26/24. Pt is attending PT for prehab before her surgery. Pt presents with an asymmetric and antalgic gait pattern, altered functional mechanics, decreased L knee ROM, decreased L knee strength, and balance impairments. Pt's hx and clinical findings are consistent with the referring diagnosis of L knee OA. Pt was educated on her diagnosis, prognosis, POC, HEP, as well as the signs and symptoms of infection and DVT. Pt's pain and deficits are preventing her from performing self care, ADLs, and recreational activities without compensations. Pt would benefit from skilled physical therapy to reduce her pain, address her deficits, and prepare her for her surgery in a few weeks.   Understanding of Dx/Px/POC: good     Prognosis: good    Goals  Short Term Goals:  1) Pt will initiate and progress her HEP in 3-4 weeks.  2) Pt will improve her knee MMT by 1 to improve her ADLs in 3-4 weeks.  3) Pt will improve her knee extension ROM to 0 to improve her gait in 3-4 weeks.    Long Term Goals:  1) Pt will be able to go up and down 15 steps reciprocally with less than 3/10 pain in 6-8 weeks.  2) Pt will be able to ambulate for at least 10-15 mins without an AD an less than 3/10 pain in 6-8 weeks.  3) Pt will be able to perform her ADLs with less than 3/10 pain in 6-8 weeks.     Plan  Patient would benefit from: PT eval and skilled physical therapy  Planned modality interventions: cryotherapy    Planned therapy interventions: activity modification, ADL retraining, joint mobilization, manual therapy, balance, balance/weight bearing training, body mechanics training, motor coordination training, neuromuscular re-education, coordination, patient/caregiver education, postural training, self care, strengthening, stretching, therapeutic activities, therapeutic exercise, flexibility, functional ROM exercises, gait training, graded  exercise and home exercise program    Frequency: 2-3x/week.  Duration in weeks: 8  Plan of Care beginning date: 2024  Plan of Care expiration date: 2024  Treatment plan discussed with: patient and family        Subjective Evaluation    History of Present Illness  Mechanism of injury: Pt was sick a few weeks ago, she was on several medications. Pt's medication was for her nervousness, she states the medication is making her feel tired. Pt feels like she is always being watched. Pt states falling recently, she was going down the steps, and fell at the last step and she hit her head. Pt is getting her knee surgery 24, L TKA. Pt is having increased pain and difficulty moving. Pt had difficulty going up and down steps. Pt has pain in both knees, but they are going to focus on the L knee first. Pt denies numbness or tingling.   Patient Goals  Patient goals for therapy: decreased pain, improved balance, increased motion, return to sport/leisure activities, independence with ADLs/IADLs and increased strength    Pain  Current pain ratin  At best pain ratin  At worst pain ratin  Location: medial left knee  Quality: tight, dull ache and sharp  Aggravating factors: stair climbing and walking  Progression: worsening    Social Support  Steps to enter house: yes  Stairs in house: yes   Lives in: multiple-level home  Lives with: adult children    Employment status: not working      Objective     Tenderness   Left Knee   Tenderness in the medial joint line and medial patella.     Active Range of Motion   Left Knee   Flexion: 108 degrees with pain  Extension: -5 degrees with pain    Right Knee   Flexion: 106 degrees   Extension: -5 degrees with pain    Passive Range of Motion   Left Knee   Flexion: 112 degrees with pain  Extension: -2 degrees with pain    Right Knee   Flexion: 115 degrees   Extension: -3 degrees     Strength/Myotome Testing     Left Knee   Flexion: 3+  Extension: 3+    Right Knee   Flexion:  3+  Extension: 3+    Additional Strength Details  Pain with extension L knee    Ambulation   Weight-Bearing Status   Weight-Bearing Status (Left): full weight bearing   Weight-Bearing Status (Right): full weight-bearing    Assistive device used: none    Observational Gait   Gait: antalgic and asymmetric   Increased right stance time and left swing time. Decreased walking speed, stride length, left stance time, right swing time, left step length and right step length.   Left foot contact pattern: foot flat  Right foot contact pattern: foot flat  Left arm swing: decreased  Right arm swing: decreased  Base of support: decreased    Functional Assessment      Squat    Pain, left valgus, left tibial anterior translation beyond toes, right valgus and right tibial anterior translation beyond toes.     Comments  6MWT: Not Tested  TUG: Not Tested  5xSTS: Not Tested                  Insurance:  Archie/CMS Eval/ Re-eval Auth #/ Referral # Total units or visits Start date  Expiration date KX? Visit limitation?  PT only or  PT+OT? Co-Insurance   CMS 7/1/24 7/1/24   BOMN                   POC Start Date POC Expiration Date Signed POC?   7/1/24 8/26/24 pending      Date               Visits/Units:  Used               Authed:  Remaining                      Precautions: HTN, hx of cancer  HEP: Access Code [email or web address]    Date     7/1/24   Visit Number     1 (IE)   Manuals                                        Neuro Re-Ed                                                                 Ther Ex                                                                        Ther Activity                        Gait Training                        Modalities

## 2024-07-02 NOTE — ASSESSMENT & PLAN NOTE
Pt is scheduled for left knee replacement with [facility].   Reviewed labs, ECG, PMH and acute problems.  Pt is considered intermediate to high risk for surgery and is medically cleared to have scheduled procedure.   Discussed with pt and  that her risk of hallucinations could be increased with anesthesia and surgery. Verbalized understanding. Following up with psych for management.

## 2024-07-08 ENCOUNTER — OFFICE VISIT (OUTPATIENT)
Dept: PHYSICAL THERAPY | Facility: CLINIC | Age: 79
End: 2024-07-08
Payer: MEDICARE

## 2024-07-08 DIAGNOSIS — M17.12 ARTHRITIS OF LEFT KNEE: ICD-10-CM

## 2024-07-08 DIAGNOSIS — M25.562 CHRONIC PAIN OF LEFT KNEE: Primary | ICD-10-CM

## 2024-07-08 DIAGNOSIS — G89.29 CHRONIC PAIN OF LEFT KNEE: Primary | ICD-10-CM

## 2024-07-08 PROCEDURE — 97110 THERAPEUTIC EXERCISES: CPT

## 2024-07-08 PROCEDURE — 97112 NEUROMUSCULAR REEDUCATION: CPT

## 2024-07-08 NOTE — PROGRESS NOTES
"Daily Note     Today's date: 2024  Patient name: Rachelle Fan  : 1945  MRN: [id number]  Referring provider: Santana Whitehead DO  Dx:   Encounter Diagnosis     ICD-10-CM    1. Chronic pain of left knee  M25.562     G89.29       2. Arthritis of left knee  M17.12           Start Time: 1748  Stop Time: 1830  Total time in clinic (min): 42 minutes    Subjective: Pt reports that her knee is feeling \"pretty good\" today prior to the start of her treatment session. She was a little sore after her evaluation but otherwise has been fine. Pt has tried to be compliant with her HEP. No new complaints.       Objective: See treatment diary below      Assessment: Tolerated treatment well. Patient demonstrated fatigue post treatment, exhibited good technique with therapeutic exercises, and would benefit from continued PT. Pt needed minor VC throughout the session to fully engage her quads in BLE. Minimal increase in pain noted at the end of the session.       Plan: Continue per plan of care.  Progress treatment as tolerated.        Insurance:  AMA/CMS Eval/ Re-eval Auth #/ Referral # Total units or visits Start date  Expiration date KX? Visit limitation?  PT only or  PT+OT? Co-Insurance   CMS 24   BOMN                   POC Start Date POC Expiration Date Signed POC?   24 pending      Date               Visits/Units:  Used               Authed:  Remaining                      Precautions: HTN, hx of cancer  HEP: Access Code [email or web address]    Date    24   Visit Number    2 1 (IE)   Manuals                                        Neuro Re-Ed         Quad sets    2x10    LAQ    2x10 BLE    SLR    1x10    SAQ    2x10    bridges    2x10                    Ther Ex        Heel slides    3x10    Standing HS curl    2x10 LLE    Standing heel raises    2x10    Side stepping    At bar 2 laps                                    Ther Activity                        Gait Training                      "   Modalities

## 2024-07-09 NOTE — PROGRESS NOTES
Spoke to Benigno and he gave me the fax number to the surgical coordinator ([phone])- faxed and received confirmation

## 2024-07-11 ENCOUNTER — TELEPHONE (OUTPATIENT)
Dept: PSYCHIATRY | Facility: CLINIC | Age: 79
End: 2024-07-11

## 2024-07-11 ENCOUNTER — OFFICE VISIT (OUTPATIENT)
Dept: PHYSICAL THERAPY | Facility: CLINIC | Age: 79
End: 2024-07-11
Payer: MEDICARE

## 2024-07-11 DIAGNOSIS — G89.29 CHRONIC PAIN OF LEFT KNEE: Primary | ICD-10-CM

## 2024-07-11 DIAGNOSIS — M17.12 ARTHRITIS OF LEFT KNEE: ICD-10-CM

## 2024-07-11 DIAGNOSIS — M25.562 CHRONIC PAIN OF LEFT KNEE: Primary | ICD-10-CM

## 2024-07-11 PROCEDURE — 97112 NEUROMUSCULAR REEDUCATION: CPT

## 2024-07-11 NOTE — PROGRESS NOTES
Daily Note     Today's date: 2024  Patient name: Rachelle Fan  : 1945  MRN: [id number]  Referring provider: Santana Whitehead DO  Dx:   Encounter Diagnosis     ICD-10-CM    1. Chronic pain of left knee  M25.562     G89.29       2. Arthritis of left knee  M17.12           Start Time: 1747  Stop Time: 183  Total time in clinic (min): 45 minutes    Subjective: Pt reports that her L knee felt good after her last session, she usually feels sore but is better by the next day. Pt denies any pain at rest prior to the start of her treatment session.       Objective: See treatment diary below      Assessment: Tolerated treatment well. Patient demonstrated fatigue post treatment, exhibited good technique with therapeutic exercises, and would benefit from continued PT. Continues to work on L knee ROM and strength to tolerance. Pt demonstrated good carryover from her previous session.       Plan: Continue per plan of care.  Progress treatment as tolerated.        Insurance:  A/CMS Eval/ Re-eval Auth #/ Referral # Total units or visits Start date  Expiration date KX? Visit limitation?  PT only or  PT+OT? Co-Insurance   CMS 24   BOMN                   POC Start Date POC Expiration Date Signed POC?   24 pending      Date               Visits/Units:  Used               Authed:  Remaining                      Precautions: HTN, hx of cancer  HEP: Access Code [email or web address]    Date   24   Visit Number   3 2 1 (IE)   Manuals                                        Neuro Re-Ed         Quad sets   2x10 2x10    LAQ    2x10 BLE    SLR   2x10 1x10    SAQ    2x10    bridges   2x10 w/ball squeeze 2x10    Bent knee fallout   RTB 2x10             Ther Ex        Heel slides   3x10 3x10    Standing HS curl    2x10 LLE    Standing heel raises   3x10 2x10    Side stepping   At bar 2 laps At bar 2 laps                                    Ther Activity                        Gait Training                         Modalities

## 2024-07-15 ENCOUNTER — OFFICE VISIT (OUTPATIENT)
Dept: PHYSICAL THERAPY | Facility: CLINIC | Age: 79
End: 2024-07-15
Payer: MEDICARE

## 2024-07-15 DIAGNOSIS — G89.29 CHRONIC PAIN OF LEFT KNEE: ICD-10-CM

## 2024-07-15 DIAGNOSIS — M25.562 CHRONIC PAIN OF LEFT KNEE: ICD-10-CM

## 2024-07-15 DIAGNOSIS — M17.12 ARTHRITIS OF LEFT KNEE: Primary | ICD-10-CM

## 2024-07-15 PROCEDURE — 97112 NEUROMUSCULAR REEDUCATION: CPT

## 2024-07-15 PROCEDURE — 97110 THERAPEUTIC EXERCISES: CPT

## 2024-07-15 NOTE — PROGRESS NOTES
Daily Note     Today's date: 7/15/2024  Patient name: Rachelle Fan  : 1945  MRN: [id number]  Referring provider: Santana Whitehead DO  Dx:   Encounter Diagnoses   Name Primary?    Arthritis of left knee Yes    Chronic pain of left knee                   Subjective: Pt reports her knees feel sore today. Left rated 5/10 and right rated 2/10.       Objective: See treatment diary below      Assessment: Pt tolerated treatment well with minimal complaints of pain. Cueing provided to minimize co contraction during quad setting. Fatigued at end of session.       Plan: Continue with plan of care to decrease pain, improve mobility, strength, and function.           Insurance:  A/CMS Eval/ Re-eval Auth #/ Referral # Total units or visits Start date  Expiration date KX? Visit limitation?  PT only or  PT+OT? Co-Insurance   CMS 24   BOMN                   POC Start Date POC Expiration Date Signed POC?   24 pending      Date               Visits/Units:  Used               Authed:  Remaining                      Precautions: HTN, hx of cancer  HEP: Access Code [email or web address]    Date  7/15 7/11 7/8 7/1/24   Visit Number  4 3 2 1 (IE)   Manuals                                        Neuro Re-Ed         Quad sets  2x10 2x10 2x10    LAQ  2x10 BLE  2x10 BLE    SLR  2x10 2x10 1x10    SAQ  2x10  2x10    bridges  2x10 2x10 w/ball squeeze 2x10    Bent knee fallout  RTB 2x10 RTB 2x10     Standing hip abd  Hold 5, 2x10 ea      Ther Ex        Heel slides  2x10 ball 3x10 3x10    Standing HS curl  2x10 LLE  2x10 LLE    Standing heel raises  3x10 3x10 2x10    Side stepping  At bar 2 laps At bar 2 laps At bar 2 laps    Ball squeeze  Hold 5, 2x10                               Ther Activity                        Gait Training                        Modalities

## 2024-07-18 ENCOUNTER — OFFICE VISIT (OUTPATIENT)
Dept: PHYSICAL THERAPY | Facility: CLINIC | Age: 79
End: 2024-07-18
Payer: MEDICARE

## 2024-07-18 ENCOUNTER — APPOINTMENT (OUTPATIENT)
Dept: PHYSICAL THERAPY | Facility: CLINIC | Age: 79
End: 2024-07-18
Payer: MEDICARE

## 2024-07-18 DIAGNOSIS — G89.29 CHRONIC PAIN OF LEFT KNEE: ICD-10-CM

## 2024-07-18 DIAGNOSIS — M25.562 CHRONIC PAIN OF LEFT KNEE: ICD-10-CM

## 2024-07-18 DIAGNOSIS — M17.12 ARTHRITIS OF LEFT KNEE: Primary | ICD-10-CM

## 2024-07-18 PROCEDURE — 97110 THERAPEUTIC EXERCISES: CPT

## 2024-07-18 PROCEDURE — 97112 NEUROMUSCULAR REEDUCATION: CPT

## 2024-07-18 NOTE — PROGRESS NOTES
Daily Note     Today's date: 2024  Patient name: Rachelle Fan  : 1945  MRN: [id number]  Referring provider: Santana Whitehead DO  Dx:   Encounter Diagnosis     ICD-10-CM    1. Arthritis of left knee  M17.12       2. Chronic pain of left knee  M25.562     G89.29           Start Time: 1018  Stop Time: 1100  Total time in clinic (min): 42 minutes    Subjective: Pt reports the same pain/discomfort in her knees prior to the start of her treatment session. Pt states she feels ready for her surgery, no new complaints.       Objective: See treatment diary below      Assessment: Tolerated treatment well. Patient demonstrated fatigue post treatment, exhibited good technique with therapeutic exercises, and would benefit from continued PT. Today was pt's last day prior to undergoing her L TKA, pt was re-educated on what to expect after surgery, signs and sx of DVT and infection, and what exercises to work on after her surgery. Pt expressed understanding post-session.       Plan: Continue per plan of care.  Progress treatment as tolerated.        Insurance:  AMA/CMS Eval/ Re-eval Auth #/ Referral # Total units or visits Start date  Expiration date KX? Visit limitation?  PT only or  PT+OT? Co-Insurance   CMS 24   BOMN                   POC Start Date POC Expiration Date Signed POC?   24 pending      Date               Visits/Units:  Used               Authed:  Remaining                      Precautions: HTN, hx of cancer  HEP: Access Code [email or web address]    Date 7/18 7/15 7/11 7/8 7/1/24   Visit Number 5 4 3 2 1 (IE)   Manuals                                        Neuro Re-Ed         Quad sets 2x10 BLE 2x10 2x10 2x10    LAQ 2x10 BLE w/2lbs 2x10 BLE  2x10 BLE    SLR 1x15 BLE 2x10 2x10 1x10    SAQ  2x10  2x10    bridges 2x10 2x10 2x10 w/ball squeeze 2x10    Bent knee fallout RTB 2x10 RTB 2x10 RTB 2x10     Standing hip abd 1x15 BLE, 3s hold Hold 5, 2x10 ea      Ther Ex        Heel slides  2x10 ball  3x10 3x10    Standing HS curl 2x10 BLE w/2lbs 2x10 LLE  2x10 LLE    Standing heel raises  3x10 3x10 2x10    Side stepping  At bar 2 laps At bar 2 laps At bar 2 laps    Ball squeeze 2x10, 5s hold Hold 5, 2x10                               Ther Activity                        Gait Training                        Modalities

## 2024-07-29 ENCOUNTER — OFFICE VISIT (OUTPATIENT)
Dept: PHYSICAL THERAPY | Facility: CLINIC | Age: 79
End: 2024-07-29
Payer: MEDICARE

## 2024-07-29 DIAGNOSIS — M17.12 ARTHRITIS OF LEFT KNEE: Primary | ICD-10-CM

## 2024-07-29 DIAGNOSIS — G89.29 CHRONIC PAIN OF LEFT KNEE: ICD-10-CM

## 2024-07-29 DIAGNOSIS — M25.562 CHRONIC PAIN OF LEFT KNEE: ICD-10-CM

## 2024-07-29 PROCEDURE — 97164 PT RE-EVAL EST PLAN CARE: CPT

## 2024-07-29 PROCEDURE — 97110 THERAPEUTIC EXERCISES: CPT

## 2024-07-29 NOTE — LETTER
2024    Santana Whitehead DO  [address] [facility] [address]  [facility] [address]    Patient: Rachelle Fan   YOB: 1945   Date of Visit: 2024     Encounter Diagnosis     ICD-10-CM    1. Arthritis of left knee  M17.12       2. Chronic pain of left knee  M25.562     G89.29           Dear Dr. Whitehead:    Thank you for your recent referral of Rachelle Fan. Please review the attached evaluation summary from Rachelle's recent visit.     Please verify that you agree with the plan of care by signing the attached order.     If you have any questions or concerns, please do not hesitate to call.     I sincerely appreciate the opportunity to share in the care of one of your patients and hope to have another opportunity to work with you in the near future.       Sincerely,    Adrianna Brown, PT      Referring Provider:      I certify that I have read the below Plan of Care and certify the need for these services furnished under this plan of treatment while under my care.                    Santana Whitehead DO  [address] [address]  Via Fax: [phone]          PT Re-Evaluation     Today's date: 2024  Patient name: Rachelle Fna  : 1945  MRN: [id number]  Referring provider: Santana Whitehead DO  Dx:   Encounter Diagnosis     ICD-10-CM    1. Arthritis of left knee  M17.12       2. Chronic pain of left knee  M25.562     G89.29                      Assessment  Impairments: abnormal coordination, abnormal gait, abnormal or restricted ROM, abnormal movement, activity intolerance, impaired balance, impaired physical strength, lacks appropriate home exercise program, pain with function, safety issue, poor posture , poor body mechanics, unable to perform ADL, participation limitations, activity limitations and endurance  Symptom irritability: moderate    Assessment details: Rachelle Fan is a 79 y.o. female who presents to physical therapy s/p left TKA with  pain, decreased LE range of motion, decreased LE strength, impaired function, decreased activity tolerance, poor balance, and swelling . Patient's clinical presentation is consistent with their referring diagnosis of Arthritis of left knee  (primary encounter diagnosis), and Chronic pain of left knee. The pt presents with functional limitations of ADLs, ambulation, self-care, and stair negotiation. Pt would benefit from physical therapy services to address these limitations and maximize function. Pt was instructed and educated on home exercise program, post-op contraindications/precautions, and weight bearing status today and demonstrates understanding.        Understanding of Dx/Px/POC: good     Prognosis: good    Goals  Short Term Goals:  1) Pt will initiate and progress her HEP in 3-4 weeks.  2) Pt will improve her knee MMT by 1 to improve her ADLs in 3-4 weeks.  3) Pt will improve her knee extension ROM to 0 to improve her gait in 3-4 weeks.    Long Term Goals:  1) Pt will be able to go up and down 15 steps reciprocally with less than 3/10 pain in 6-8 weeks.  2) Pt will be able to ambulate for at least 10-15 mins without an AD an less than 3/10 pain in 6-8 weeks.  3) Pt will be able to perform her ADLs with less than 3/10 pain in 6-8 weeks.     Plan  Patient would benefit from: PT eval and skilled physical therapy  Planned modality interventions: cryotherapy    Planned therapy interventions: activity modification, ADL retraining, joint mobilization, manual therapy, balance, balance/weight bearing training, body mechanics training, motor coordination training, neuromuscular re-education, coordination, patient/caregiver education, postural training, self care, strengthening, stretching, therapeutic activities, therapeutic exercise, flexibility, functional ROM exercises, gait training, graded exercise and home exercise program    Frequency: 2-3x/week.  Duration in weeks: 8  Plan of Care beginning date:  2024  Plan of Care expiration date: 2024  Treatment plan discussed with: patient, family and caregiver        Subjective Evaluation    History of Present Illness  Mechanism of injury: Pt is s/p left TKR on 24. States the surgery went well and was discharged home. Reports her pain has been tolerable and is taking pain meds. States most of her pain is stemming from her brace that is digging into her ankle. She was instructed to use brace with weight bearing until first follow up with surgeon. Ambulating with rolling walking and negotiating stairs non reciprocally with railing. Sleeping in recliner.   Patient Goals  Patient goals for therapy: decreased pain, improved balance, increased motion, return to sport/leisure activities, independence with ADLs/IADLs and increased strength    Pain  Current pain ratin  At best pain ratin  At worst pain ratin  Location: left knee  Quality: tight, dull ache and sharp  Aggravating factors: stair climbing and walking  Progression: worsening    Social Support  Steps to enter house: yes  Stairs in house: yes   Lives in: multiple-level home  Lives with: adult children    Employment status: not working      Objective     Observations   Left Knee   Positive for edema and incision.     Active Range of Motion   Left Knee   Flexion: 35 degrees with pain  Extension: -5 degrees with pain    Right Knee   Flexion: 106 degrees   Extension: -5 degrees with pain    Passive Range of Motion   Left Knee   Flexion: 45 degrees with pain  Extension: 0 degrees with pain    Right Knee   Flexion: 115 degrees   Extension: -3 degrees     Strength/Myotome Testing     Left Knee   Flexion: 2  Extension: 2    Right Knee   Flexion: 4-  Extension: 4-    Additional Strength Details  Min assist required for SLR    Ambulation   Weight-Bearing Status   Weight-Bearing Status (Left): weight-bearing as tolerated   Weight-Bearing Status (Right): full weight-bearing    Assistive device used: none  and two-wheeled walker    Ambulation: Level Surfaces   Ambulation with assistive device: independent  Ambulation without assistive device: unable    Ambulation: Stairs   Ascend stairs: contact guard assist  Pattern: non-reciprocal  Railings: one rail  Descend stairs: contact guard assist  Pattern: non-reciprocal  Railings: one rail    Observational Gait   Gait: antalgic and asymmetric   Increased right stance time and left swing time. Decreased walking speed, stride length, left stance time, right swing time, left step length and right step length.   Left foot contact pattern: foot flat  Right foot contact pattern: foot flat  Left arm swing: decreased  Right arm swing: decreased  Base of support: decreased    Functional Assessment      Squat    Unable to perform .     Comments  6MWT: Not Tested  TUG: Not Tested  5xSTS: Not Tested                  Insurance:  [place]/CMS Eval/ Re-eval Auth #/ Referral # Total units or visits Start date  Expiration date KX? Visit limitation?  PT only or  PT+OT? Co-Insurance   CMS 7/1/24 7/1/24   BOMN                   POC Start Date POC Expiration Date Signed POC?   7/1/24 8/26/24 pending      Date               Visits/Units:  Used               Authed:  Remaining                      Precautions: HTN, hx of cancer  HEP: Access Code [email or web address]    Date 7/29 7/18 7/15 7/11 7/8 7/1/24   Visit Number 6 5 4 3 2 1 (IE)   Manuals                                             Neuro Re-Ed          Quad sets 2x10 2x10 BLE 2x10 2x10 2x10    LAQ  2x10 BLE w/2lbs 2x10 BLE  2x10 BLE    SLR 2x5 min assist 1x15 BLE 2x10 2x10 1x10    SAQ   2x10  2x10    bridges Glut set 2x10 2x10 2x10 2x10 w/ball squeeze 2x10    Bent knee fallout  RTB 2x10 RTB 2x10 RTB 2x10     Standing hip abd  1x15 BLE, 3s hold Hold 5, 2x10 ea      Ther Ex         Heel slides Hold 10, 1x10   2x10 ball 3x10 3x10    Standing HS curl  2x10 BLE w/2lbs 2x10 LLE  2x10 LLE    Standing heel raises   3x10 3x10 2x10    Side stepping   At bar 2 laps  At bar 2 laps At bar 2 laps    Ball squeeze  2x10, 5s hold Hold 5, 2x10       Gs stretch Hold 10, 1x10         Ankle pumps 2x10                 Ther Activity                           Gait Training                           Modalities

## 2024-07-29 NOTE — PROGRESS NOTES
PT Re-Evaluation     Today's date: 2024  Patient name: Rachelle Fan  : 1945  MRN: [id number]  Referring provider: Santana Whitehead DO  Dx:   Encounter Diagnosis     ICD-10-CM    1. Arthritis of left knee  M17.12       2. Chronic pain of left knee  M25.562     G89.29                      Assessment  Impairments: abnormal coordination, abnormal gait, abnormal or restricted ROM, abnormal movement, activity intolerance, impaired balance, impaired physical strength, lacks appropriate home exercise program, pain with function, safety issue, poor posture , poor body mechanics, unable to perform ADL, participation limitations, activity limitations and endurance  Symptom irritability: moderate    Assessment details: Rachelle Fan is a 79 y.o. female who presents to physical therapy s/p left TKA with pain, decreased LE range of motion, decreased LE strength, impaired function, decreased activity tolerance, poor balance, and swelling . Patient's clinical presentation is consistent with their referring diagnosis of Arthritis of left knee  (primary encounter diagnosis), and Chronic pain of left knee. The pt presents with functional limitations of ADLs, ambulation, self-care, and stair negotiation. Pt would benefit from physical therapy services to address these limitations and maximize function. Pt was instructed and educated on home exercise program, post-op contraindications/precautions, and weight bearing status today and demonstrates understanding.        Understanding of Dx/Px/POC: good     Prognosis: good    Goals  Short Term Goals:  1) Pt will initiate and progress her HEP in 3-4 weeks.  2) Pt will improve her knee MMT by 1 to improve her ADLs in 3-4 weeks.  3) Pt will improve her knee extension ROM to 0 to improve her gait in 3-4 weeks.    Long Term Goals:  1) Pt will be able to go up and down 15 steps reciprocally with less than 3/10 pain in 6-8 weeks.  2) Pt will be able to ambulate for at least 10-15  mins without an AD an less than 3/10 pain in 6-8 weeks.  3) Pt will be able to perform her ADLs with less than 3/10 pain in 6-8 weeks.     Plan  Patient would benefit from: PT eval and skilled physical therapy  Planned modality interventions: cryotherapy    Planned therapy interventions: activity modification, ADL retraining, joint mobilization, manual therapy, balance, balance/weight bearing training, body mechanics training, motor coordination training, neuromuscular re-education, coordination, patient/caregiver education, postural training, self care, strengthening, stretching, therapeutic activities, therapeutic exercise, flexibility, functional ROM exercises, gait training, graded exercise and home exercise program    Frequency: 2-3x/week.  Duration in weeks: 8  Plan of Care beginning date: 2024  Plan of Care expiration date: 2024  Treatment plan discussed with: patient, family and caregiver        Subjective Evaluation    History of Present Illness  Mechanism of injury: Pt is s/p left TKR on 24. States the surgery went well and was discharged home. Reports her pain has been tolerable and is taking pain meds. States most of her pain is stemming from her brace that is digging into her ankle. She was instructed to use brace with weight bearing until first follow up with surgeon. Ambulating with rolling walking and negotiating stairs non reciprocally with railing. Sleeping in recliner.   Patient Goals  Patient goals for therapy: decreased pain, improved balance, increased motion, return to sport/leisure activities, independence with ADLs/IADLs and increased strength    Pain  Current pain ratin  At best pain ratin  At worst pain ratin  Location: left knee  Quality: tight, dull ache and sharp  Aggravating factors: stair climbing and walking  Progression: worsening    Social Support  Steps to enter house: yes  Stairs in house: yes   Lives in: multiple-level home  Lives with: adult  children    Employment status: not working      Objective     Observations   Left Knee   Positive for edema and incision.     Active Range of Motion   Left Knee   Flexion: 35 degrees with pain  Extension: -5 degrees with pain    Right Knee   Flexion: 106 degrees   Extension: -5 degrees with pain    Passive Range of Motion   Left Knee   Flexion: 45 degrees with pain  Extension: 0 degrees with pain    Right Knee   Flexion: 115 degrees   Extension: -3 degrees     Strength/Myotome Testing     Left Knee   Flexion: 2  Extension: 2    Right Knee   Flexion: 4-  Extension: 4-    Additional Strength Details  Min assist required for SLR    Ambulation   Weight-Bearing Status   Weight-Bearing Status (Left): weight-bearing as tolerated   Weight-Bearing Status (Right): full weight-bearing    Assistive device used: none and two-wheeled walker    Ambulation: Level Surfaces   Ambulation with assistive device: independent  Ambulation without assistive device: unable    Ambulation: Stairs   Ascend stairs: contact guard assist  Pattern: non-reciprocal  Railings: one rail  Descend stairs: contact guard assist  Pattern: non-reciprocal  Railings: one rail    Observational Gait   Gait: antalgic and asymmetric   Increased right stance time and left swing time. Decreased walking speed, stride length, left stance time, right swing time, left step length and right step length.   Left foot contact pattern: foot flat  Right foot contact pattern: foot flat  Left arm swing: decreased  Right arm swing: decreased  Base of support: decreased    Functional Assessment      Squat    Unable to perform .     Comments  6MWT: Not Tested  TUG: Not Tested  5xSTS: Not Tested                  Insurance:  A/CMS Eval/ Re-eval Auth #/ Referral # Total units or visits Start date  Expiration date KX? Visit limitation?  PT only or  PT+OT? Co-Insurance   CMS 7/1/24 7/1/24   BOMN                   POC Start Date POC Expiration Date Signed POC?   7/1/24 8/26/24  pending      Date               Visits/Units:  Used               Authed:  Remaining                      Precautions: HTN, hx of cancer  HEP: Access Code [email or web address]    Date 7/29 7/18 7/15 7/11 7/8 7/1/24   Visit Number 6 5 4 3 2 1 (IE)   Manuals                                             Neuro Re-Ed          Quad sets 2x10 2x10 BLE 2x10 2x10 2x10    LAQ  2x10 BLE w/2lbs 2x10 BLE  2x10 BLE    SLR 2x5 min assist 1x15 BLE 2x10 2x10 1x10    SAQ   2x10  2x10    bridges Glut set 2x10 2x10 2x10 2x10 w/ball squeeze 2x10    Bent knee fallout  RTB 2x10 RTB 2x10 RTB 2x10     Standing hip abd  1x15 BLE, 3s hold Hold 5, 2x10 ea      Ther Ex         Heel slides Hold 10, 1x10   2x10 ball 3x10 3x10    Standing HS curl  2x10 BLE w/2lbs 2x10 LLE  2x10 LLE    Standing heel raises   3x10 3x10 2x10    Side stepping   At bar 2 laps At bar 2 laps At bar 2 laps    Ball squeeze  2x10, 5s hold Hold 5, 2x10       Gs stretch Hold 10, 1x10         Ankle pumps 2x10                 Ther Activity                           Gait Training                           Modalities

## 2024-07-30 ENCOUNTER — OFFICE VISIT (OUTPATIENT)
Dept: PHYSICAL THERAPY | Facility: CLINIC | Age: 79
End: 2024-07-30
Payer: MEDICARE

## 2024-07-30 DIAGNOSIS — M17.12 ARTHRITIS OF LEFT KNEE: Primary | ICD-10-CM

## 2024-07-30 DIAGNOSIS — G89.29 CHRONIC PAIN OF LEFT KNEE: ICD-10-CM

## 2024-07-30 DIAGNOSIS — M25.562 CHRONIC PAIN OF LEFT KNEE: ICD-10-CM

## 2024-07-30 PROCEDURE — 97110 THERAPEUTIC EXERCISES: CPT

## 2024-07-30 PROCEDURE — 97112 NEUROMUSCULAR REEDUCATION: CPT

## 2024-07-30 NOTE — PROGRESS NOTES
Daily Note     Today's date: 2024  Patient name: Rachelle Fan  : 1945  MRN: [id number]  Referring provider: Santana Whitehead DO  Dx:   Encounter Diagnosis     ICD-10-CM    1. Arthritis of left knee  M17.12       2. Chronic pain of left knee  M25.562     G89.29           Start Time: 1747  Stop Time: 1830  Total time in clinic (min): 43 minutes    Subjective: Pt was told that she has to wear her brace until next week, however her follow-up visit is this Thursday and will be given a smaller one that fits her better and doesn't dig into her ankle. Pt states her pain comes and goes, manages with medication. Pt continues utilizing RW and is staying on 1 floor because going up stairs has been difficult and requires a lot of assistance.       Objective: See treatment diary below      Assessment: Tolerated treatment well. Patient demonstrated fatigue post treatment, exhibited good technique with therapeutic exercises, and would benefit from continued PT. Pt demonstrated slightly improved quad activation during her quad sets and SLR with minimal assist needed from PT. Pt continues to utilize her leg brace during WB activities until cleared from her surgeon. Continues to be very limited into knee flexion A/PROM.      Plan: Continue per plan of care.  Progress treatment as tolerated.        Insurance:  AMA/CMS Eval/ Re-eval Auth #/ Referral # Total units or visits Start date  Expiration date KX? Visit limitation?  PT only or  PT+OT? Co-Insurance   CMS 24   BOMN     CMS 24             POC Start Date POC Expiration Date Signed POC?   24 pending   24 pending      Date               Visits/Units:  Used               Authed:  Remaining                      Precautions: HTN, hx of cancer  HEP: Access Code [email or web address]    Date 7/30 7/29 7/18 7/15 7/11 7/8   Visit Number 7 6 5 4 3 2   Manuals         Knee PROM Flexion & ext                                   Neuro Re-Ed           Quad sets 2x10 LLE 2x10 2x10 BLE 2x10 2x10 2x10   LAQ   2x10 BLE w/2lbs 2x10 BLE  2x10 BLE   Clinic ambulation W/RW 2 laps 25ft        SLR 1x15 w/min assist from PT 2x5 min assist 1x15 BLE 2x10 2x10 1x10   SAQ 2x10   2x10  2x10   bridges Glute set 2x10 Glut set 2x10 2x10 2x10 2x10 w/ball squeeze 2x10   Bent knee fallout   RTB 2x10 RTB 2x10 RTB 2x10    Standing hip abd   1x15 BLE, 3s hold Hold 5, 2x10 ea     Ther Ex         Heel slides 2x10, 5s hold Hold 10, 1x10   2x10 ball 3x10 3x10   Standing HS curl   2x10 BLE w/2lbs 2x10 LLE  2x10 LLE   Standing heel raises    3x10 3x10 2x10   Side stepping    At bar 2 laps At bar 2 laps At bar 2 laps   Ball squeeze   2x10, 5s hold Hold 5, 2x10      Gs stretch 1x10, 10s hold Hold 10, 1x10        Ankle pumps 3x10 2x10       Seated HS str         Ther Activity                           Gait Training                           Modalities

## 2024-08-01 ENCOUNTER — OFFICE VISIT (OUTPATIENT)
Dept: PHYSICAL THERAPY | Facility: CLINIC | Age: 79
End: 2024-08-01
Payer: MEDICARE

## 2024-08-01 DIAGNOSIS — E03.9 HYPOTHYROIDISM, UNSPECIFIED TYPE: ICD-10-CM

## 2024-08-01 DIAGNOSIS — G89.29 CHRONIC PAIN OF LEFT KNEE: ICD-10-CM

## 2024-08-01 DIAGNOSIS — M25.562 CHRONIC PAIN OF LEFT KNEE: ICD-10-CM

## 2024-08-01 DIAGNOSIS — M17.12 ARTHRITIS OF LEFT KNEE: Primary | ICD-10-CM

## 2024-08-01 PROCEDURE — 97112 NEUROMUSCULAR REEDUCATION: CPT

## 2024-08-01 PROCEDURE — 97110 THERAPEUTIC EXERCISES: CPT

## 2024-08-01 RX ORDER — LEVOTHYROXINE SODIUM 88 UG/1
88 TABLET ORAL DAILY
Qty: 90 TABLET | Refills: 3 | Status: SHIPPED | OUTPATIENT
Start: 2024-08-01

## 2024-08-01 NOTE — PROGRESS NOTES
Daily Note     Today's date: 2024  Patient name: Rachelle Fan  : 1945  MRN: [id number]  Referring provider: Santana Whitehead DO  Dx:   Encounter Diagnosis     ICD-10-CM    1. Arthritis of left knee  M17.12       2. Chronic pain of left knee  M25.562     G89.29           Start Time: 1747  Stop Time: 1830  Total time in clinic (min): 43 minutes    Subjective: Pt states continued knee pain and LE swelling, however is doing well overall. Pt states feeling good after her last session. She just had her first post-op appointment with her surgeon and was told she is doing excellent with her ROM. Pt was given a smaller brace which no longer digs into her ankle. No new complaints.       Objective: See treatment diary below      Assessment: Tolerated treatment well. Patient demonstrated fatigue post treatment, exhibited good technique with therapeutic exercises, and would benefit from continued PT. Pt shows improved quad activation with no assist needed from PT during SLR, as well as increased knee A/PROM during heel slides and manual stretching. Progressed according to pt's tolerance. Continues to require wearing her knee brace when WB until her next follow-up next Thursday.      Plan: Continue per plan of care.  Progress treatment as tolerated.        Insurance:  AMA/CMS Eval/ Re-eval Auth #/ Referral # Total units or visits Start date  Expiration date KX? Visit limitation?  PT only or  PT+OT? Co-Insurance   CMS 24   BOMN     CMS 24     YES- starting August        POC Start Date POC Expiration Date Signed POC?   24 pending   24 pending      Date               Visits/Units:  Used               Authed:  Remaining                      Precautions: HTN, hx of cancer  HEP: Access Code [email or web address]  KX NEEDED  Date 8/1 7/30 7/29 7/18 7/15 7/11   Visit Number 8 7 6 5 4 3   Manuals         Knee PROM Flexion in sitting on hi-low mat Flexion & ext                                   Neuro Re-Ed          Quad sets 3x10 LLE 2x10 LLE 2x10 2x10 BLE 2x10 2x10   LAQ    2x10 BLE w/2lbs 2x10 BLE    Clinic ambulation W/RW 1 lap 25ft W/RW 2 laps 25ft       SLR 1x15 no assist 1x15 w/min assist from PT 2x5 min assist 1x15 BLE 2x10 2x10   SAQ 2x10 2x10   2x10    bridges Glute set 2x10 Glute set 2x10 Glut set 2x10 2x10 2x10 2x10 w/ball squeeze   Mini squats         Bent knee fallout    RTB 2x10 RTB 2x10 RTB 2x10   Standing hip abd    1x15 BLE, 3s hold Hold 5, 2x10 ea    Ther Ex         Heel slides 3x10, 5s hold (90 degrees) 2x10, 5s hold Hold 10, 1x10   2x10 ball 3x10   Standing HS curl Seated on hi-low mat 2x10   2x10 BLE w/2lbs 2x10 LLE    Standing heel raises     3x10 3x10   Side stepping     At bar 2 laps At bar 2 laps   Ball squeeze    2x10, 5s hold Hold 5, 2x10     Gs stretch  1x10, 10s hold Hold 10, 1x10       Ankle pumps  3x10 2x10      Seated HS str         Ther Activity                           Gait Training                           Modalities

## 2024-08-05 ENCOUNTER — OFFICE VISIT (OUTPATIENT)
Dept: PHYSICAL THERAPY | Facility: CLINIC | Age: 79
End: 2024-08-05
Payer: MEDICARE

## 2024-08-05 DIAGNOSIS — G89.29 CHRONIC PAIN OF LEFT KNEE: ICD-10-CM

## 2024-08-05 DIAGNOSIS — M17.12 ARTHRITIS OF LEFT KNEE: Primary | ICD-10-CM

## 2024-08-05 DIAGNOSIS — M25.562 CHRONIC PAIN OF LEFT KNEE: ICD-10-CM

## 2024-08-05 PROCEDURE — 97112 NEUROMUSCULAR REEDUCATION: CPT

## 2024-08-05 PROCEDURE — 97110 THERAPEUTIC EXERCISES: CPT

## 2024-08-05 NOTE — PROGRESS NOTES
Daily Note     Today's date: 2024  Patient name: Rachelle Fan  : 1945  MRN: [id number]  Referring provider: Santana Whitehead DO  Dx:   Encounter Diagnosis     ICD-10-CM    1. Arthritis of left knee  M17.12       2. Chronic pain of left knee  M25.562     G89.29           Start Time: 1747  Stop Time: 1830  Total time in clinic (min): 43 minutes    Subjective: Pt reports that her knee was sore after her last session but overall is doing well. She doesn't like wearing her knee brace, has another follow-up with her surgeon this Thursday. Pt's  reports she did stand up and walk a little the other day without the brace on without issue.       Objective: See treatment diary below      Assessment: Tolerated treatment well. Patient demonstrated fatigue post treatment, exhibited good technique with therapeutic exercises, and would benefit from continued PT      Plan: Continue per plan of care.  Progress treatment as tolerated.        Insurance:  AMA/CMS Eval/ Re-eval Auth #/ Referral # Total units or visits Start date  Expiration date KX? Visit limitation?  PT only or  PT+OT? Co-Insurance   CMS 24   BOMN     CMS 24     YES- starting August        POC Start Date POC Expiration Date Signed POC?   24 pending   24 pending      Date               Visits/Units:  Used               Authed:  Remaining                      Precautions: HTN, hx of cancer  HEP: Access Code [email or web address]  KX NEEDED  Date 8/5 8/1 7/30 7/29 7/18 7/15   Visit Number [phone] 5 4   Manuals         Knee PROM Flexion supine & ext Flexion in sitting on hi-low mat Flexion & ext                                 Neuro Re-Ed          Quad sets 3x10 LLE 3x10 LLE 2x10 LLE 2x10 2x10 BLE 2x10   LAQ     2x10 BLE w/2lbs 2x10 BLE   Clinic ambulation  W/RW 1 lap 25ft W/RW 2 laps 25ft      SLR 2x10 no assist 1x15 no assist 1x15 w/min assist from PT 2x5 min assist 1x15 BLE 2x10   SAQ 3x10 2x10 2x10   2x10   LAQ 2x10         bridges  Glute set 2x10 Glute set 2x10 Glut set 2x10 2x10 2x10   Mini squats         Bent knee fallout     RTB 2x10 RTB 2x10   Standing hip abd     1x15 BLE, 3s hold Hold 5, 2x10 ea   Ther Ex         Heel slides 3x10, 5s hold (92 degrees) 3x10, 5s hold (90 degrees) 2x10, 5s hold Hold 10, 1x10   2x10 ball   Standing HS curl Seated on hi-low mat 2x10 Seated on hi-low mat 2x10   2x10 BLE w/2lbs 2x10 LLE   Standing heel raises      3x10   Side stepping      At bar 2 laps   Ball squeeze     2x10, 5s hold Hold 5, 2x10    Gs stretch 1x10, 10s hold  1x10, 10s hold Hold 10, 1x10      Ankle pumps   3x10 2x10     Seated HS str         Ther Activity                           Gait Training                           Modalities

## 2024-08-06 ENCOUNTER — OFFICE VISIT (OUTPATIENT)
Dept: PHYSICAL THERAPY | Facility: CLINIC | Age: 79
End: 2024-08-06
Payer: MEDICARE

## 2024-08-06 DIAGNOSIS — G89.29 CHRONIC PAIN OF LEFT KNEE: ICD-10-CM

## 2024-08-06 DIAGNOSIS — M25.562 CHRONIC PAIN OF LEFT KNEE: ICD-10-CM

## 2024-08-06 DIAGNOSIS — M17.12 ARTHRITIS OF LEFT KNEE: Primary | ICD-10-CM

## 2024-08-06 PROCEDURE — 97112 NEUROMUSCULAR REEDUCATION: CPT

## 2024-08-06 NOTE — PROGRESS NOTES
Daily Note     Today's date: 2024  Patient name: Rachelle Fan  : 1945  MRN: [id number]  Referring provider: Santana Whitehead DO  Dx:   Encounter Diagnosis     ICD-10-CM    1. Arthritis of left knee  M17.12       2. Chronic pain of left knee  M25.562     G89.29           Start Time: 1747  Stop Time: 1830  Total time in clinic (min): 43 minutes    Subjective: Pt reports that she is more sore from her session last night, not feeling as well as she did yesterday. Pt denies any significant changes, no new complaints.       Objective: See treatment diary below      Assessment: Tolerated treatment well. Patient demonstrated fatigue post treatment, exhibited good technique with therapeutic exercises, and would benefit from continued PT. Continued working on pt's ROM, introduced recumbent bike without any increases in pain.       Plan: Continue per plan of care.  Progress treatment as tolerated.        Insurance:  A/CMS Eval/ Re-eval Auth #/ Referral # Total units or visits Start date  Expiration date KX? Visit limitation?  PT only or  PT+OT? Co-Insurance   CMS 24   BOMN     CMS 24     YES- starting August        POC Start Date POC Expiration Date Signed POC?   24 pending   24 pending      Date               Visits/Units:  Used               Authed:  Remaining                      Precautions: HTN, hx of cancer  HEP: Access Code [email or web address]  KX NEEDED  Date    Visit Number 10 9 8 7 6 5   Manuals         Knee PROM  Flexion supine & ext Flexion in sitting on hi-low mat Flexion & ext                                Neuro Re-Ed          Quad sets 3x10 3x10 LLE 3x10 LLE 2x10 LLE 2x10 2x10 BLE   LAQ      2x10 BLE w/2lbs   Clinic ambulation   W/RW 1 lap 25ft W/RW 2 laps 25ft     SLR 2x10 no assist 2x10 no assist 1x15 no assist 1x15 w/min assist from PT 2x5 min assist 1x15 BLE   SAQ 2x10 3x10 2x10 2x10     LAQ 2x10 2x10       bridges   Glute set 2x10  Glute set 2x10 Glut set 2x10 2x10   Mini squats         Bent knee fallout      RTB 2x10   Standing hip abd      1x15 BLE, 3s hold   Ther Ex         Heel slides 3x10, 5s hold (95 degrees) w/SOS 3x10, 5s hold (92 degrees) 3x10, 5s hold (90 degrees) 2x10, 5s hold Hold 10, 1x10     Standing HS curl Seated 2x10 Seated on hi-low mat 2x10 Seated on hi-low mat 2x10   2x10 BLE w/2lbs   Standing heel raises         Side stepping         Ball squeeze      2x10, 5s hold   Recumbent bike 4 mins        Gs stretch 1x15, 5s hold 1x10, 10s hold  1x10, 10s hold Hold 10, 1x10     Ankle pumps    3x10 2x10    Seated HS str         Ther Activity                           Gait Training                           Modalities

## 2024-08-08 ENCOUNTER — OFFICE VISIT (OUTPATIENT)
Dept: PHYSICAL THERAPY | Facility: CLINIC | Age: 79
End: 2024-08-08
Payer: MEDICARE

## 2024-08-08 DIAGNOSIS — M17.12 ARTHRITIS OF LEFT KNEE: Primary | ICD-10-CM

## 2024-08-08 DIAGNOSIS — G89.29 CHRONIC PAIN OF LEFT KNEE: ICD-10-CM

## 2024-08-08 DIAGNOSIS — M25.562 CHRONIC PAIN OF LEFT KNEE: ICD-10-CM

## 2024-08-08 PROCEDURE — 97112 NEUROMUSCULAR REEDUCATION: CPT

## 2024-08-08 NOTE — PROGRESS NOTES
Daily Note     Today's date: 2024  Patient name: Rachelle Fan  : 1945  MRN: [id number]  Referring provider: Santana Whitehead DO  Dx:   Encounter Diagnosis     ICD-10-CM    1. Arthritis of left knee  M17.12       2. Chronic pain of left knee  M25.562     G89.29           Start Time: 1748  Stop Time: 1830  Total time in clinic (min): 42 minutes    Subjective: Pt reports seeing her surgeon and states they were surprised and pleased with how well she is doing. Pt no longer has to wear her knee brace but was told to continue using her walker. Pt states her knee was sore after her last session but feels better today.       Objective: See treatment diary below      Assessment: Tolerated treatment well. Patient demonstrated fatigue post treatment, exhibited good technique with therapeutic exercises, and would benefit from continued PT. Progressed pt now that she is out of her brace, adding in step ups, STS, and squats with VC and TC for knee mechanics and less UE use.       Plan: Continue per plan of care.  Progress treatment as tolerated.        Insurance:  AMA/CMS Eval/ Re-eval Auth #/ Referral # Total units or visits Start date  Expiration date KX? Visit limitation?  PT only or  PT+OT? Co-Insurance   CMS 24   BOMN     CMS 24     YES- starting August        POC Start Date POC Expiration Date Signed POC?   24 pending   24 pending      Date               Visits/Units:  Used               Authed:  Remaining                      Precautions: HTN, hx of cancer  HEP: Access Code [email or web address]  KX NEEDED  Date    Visit Number 11 10 9 8 7 6   Manuals         Knee PROM   Flexion supine & ext Flexion in sitting on hi-low mat Flexion & ext                               Neuro Re-Ed          Quad sets  3x10 3x10 LLE 3x10 LLE 2x10 LLE 2x10   LAQ         Clinic ambulation    W/RW 1 lap 25ft W/RW 2 laps 25ft    SLR 2x10 no assist 2x10 no assist 2x10 no assist  1x15 no assist 1x15 w/min assist from PT 2x5 min assist   SAQ  2x10 3x10 2x10 2x10    LAQ 2x10 2x10 2x10      STS From hi-low mat 1x10 (PT assistance on first 2)        bridges    Glute set 2x10 Glute set 2x10 Glut set 2x10   Step ups 4in 1x10 LLE  6in 1x10 LLE        Mini squats 1x10        Bent knee fallout         Standing hip abd         Ther Ex         Heel slides 3x10, 5s hold w/SOS (97 degrees) 3x10, 5s hold (95 degrees) w/SOS 3x10, 5s hold (92 degrees) 3x10, 5s hold (90 degrees) 2x10, 5s hold Hold 10, 1x10    Standing HS curl  Seated 2x10 Seated on hi-low mat 2x10 Seated on hi-low mat 2x10     Standing heel raises         Side stepping         Ball squeeze         Recumbent bike  4 mins       Gs stretch  1x15, 5s hold 1x10, 10s hold  1x10, 10s hold Hold 10, 1x10    Ankle pumps     3x10 2x10   Seated HS str         Ther Activity                           Gait Training                           Modalities

## 2024-08-12 ENCOUNTER — OFFICE VISIT (OUTPATIENT)
Dept: PHYSICAL THERAPY | Facility: CLINIC | Age: 79
End: 2024-08-12
Payer: MEDICARE

## 2024-08-12 DIAGNOSIS — M17.12 ARTHRITIS OF LEFT KNEE: Primary | ICD-10-CM

## 2024-08-12 DIAGNOSIS — M25.562 CHRONIC PAIN OF LEFT KNEE: ICD-10-CM

## 2024-08-12 DIAGNOSIS — G89.29 CHRONIC PAIN OF LEFT KNEE: ICD-10-CM

## 2024-08-12 PROCEDURE — 97112 NEUROMUSCULAR REEDUCATION: CPT | Performed by: PHYSICAL THERAPIST

## 2024-08-12 NOTE — PROGRESS NOTES
"Daily Note     Today's date: 2024  Patient name: Rachelle Fan  : 1945  MRN: [id number]  Referring provider: Santana Whitehead DO  Dx:   Encounter Diagnosis     ICD-10-CM    1. Arthritis of left knee  M17.12       2. Chronic pain of left knee  M25.562     G89.29         Start Time: 1755  Stop Time: 1835  Total time in clinic (min): 40 minutes    Subjective: Client reports having a follow up with her surgeon this Thursday. She reports feeling very \"low energy\" today. She also brought in her SPC and wanted to start to practice with it, especially for stairs.       Objective: See treatment diary below      Assessment: Session modified due to increased fatigue today consistent with mild pallor. Vitals monitored and remained stable throughout. Recommended for client to utilize RW when she feels fatigued and to self monitored her BP until her next follow up on Thursday and to seek higher medical care if her symptoms worsen. She verbalized good understanding.  She demonstrated fatigue post treatment, exhibited good technique with therapeutic exercises, and would benefit from continued PT.       Plan: Continue per plan of care.  Progress treatment as tolerated.        Insurance:  AMA/CMS Eval/ Re-eval Auth #/ Referral # Total units or visits Start date  Expiration date KX? Visit limitation?  PT only or  PT+OT? Co-Insurance   CMS 24   BOMN     CMS 24     YES- starting August        POC Start Date POC Expiration Date Signed POC?   24 pending   24 pending      Date               Visits/Units:  Used               Authed:  Remaining                      Precautions: HTN, hx of cancer  HEP: Access Code [email or web address]  KX NEEDED  Date    Visit Number 12 11 10 9 8 7 6   Manuals          Knee PROM    Flexion supine & ext Flexion in sitting on hi-low mat Flexion & ext                                  Neuro Re-Ed           Quad sets 2x10 LLE  3x10 3x10 " LLE 3x10 LLE 2x10 LLE 2x10   LAQ          Clinic ambulation Using SPC 20 feet x 4, CS-CG    W/RW 1 lap 25ft W/RW 2 laps 25ft    SLR 2x10 no assist 2x10 no assist 2x10 no assist 2x10 no assist 1x15 no assist 1x15 w/min assist from PT 2x5 min assist   SAQ   2x10 3x10 2x10 2x10    LAQ 2x10 2x10 2x10 2x10      STS  From hi-low mat 1x10 (PT assistance on first 2)        bridges     Glute set 2x10 Glute set 2x10 Glut set 2x10   Step ups Using 1HR and SPC with 6-inch step 25x (+) education and training 4in 1x10 LLE  6in 1x10 LLE        Mini squats  1x10        Bent knee fallout          Standing hip abd                    Ther Ex          Heel slides AA/PROM LLE 30x LLE 0-98 3x10, 5s hold w/SOS (97 degrees) 3x10, 5s hold (95 degrees) w/SOS 3x10, 5s hold (92 degrees) 3x10, 5s hold (90 degrees) 2x10, 5s hold Hold 10, 1x10    Standing HS curl   Seated 2x10 Seated on hi-low mat 2x10 Seated on hi-low mat 2x10     Standing heel raises          Side stepping          Ball squeeze          Vitals 144/87 mmHg 100 bpm         Recumbent bike   4 mins       Gs stretch   1x15, 5s hold 1x10, 10s hold  1x10, 10s hold Hold 10, 1x10    Ankle pumps      3x10 2x10   Seated HS str          Ther Activity                              Gait Training                              Modalities

## 2024-08-13 ENCOUNTER — OFFICE VISIT (OUTPATIENT)
Dept: PHYSICAL THERAPY | Facility: CLINIC | Age: 79
End: 2024-08-13
Payer: MEDICARE

## 2024-08-13 DIAGNOSIS — G89.29 CHRONIC PAIN OF LEFT KNEE: ICD-10-CM

## 2024-08-13 DIAGNOSIS — M17.12 ARTHRITIS OF LEFT KNEE: Primary | ICD-10-CM

## 2024-08-13 DIAGNOSIS — M25.562 CHRONIC PAIN OF LEFT KNEE: ICD-10-CM

## 2024-08-13 PROCEDURE — 97112 NEUROMUSCULAR REEDUCATION: CPT

## 2024-08-13 PROCEDURE — 97110 THERAPEUTIC EXERCISES: CPT

## 2024-08-13 NOTE — PROGRESS NOTES
Daily Note     Today's date: 2024  Patient name: Rachelle Fan  : 1945  MRN: [id number]  Referring provider: Santana Whitehead DO  Dx:   Encounter Diagnosis     ICD-10-CM    1. Arthritis of left knee  M17.12       2. Chronic pain of left knee  M25.562     G89.29           Start Time: 1700  Stop Time: 1745  Total time in clinic (min): 45 minutes    Subjective: Pt states that her knee continues to do well, minimal pain at rest prior to the start of her session. Pt states feeling a little better today than she did last night but still feels heavy in the head and a little foggy at times. Pt states not taking her pain medication recently because she hasn't needed it, she also made sure to eat and drink water before the session today. She has a follow-up with her surgeon this Thursday.      Objective: See treatment diary below  HR: 96  SP02: 97  BP: 138/81 sitting    Assessment: Tolerated treatment well. Patient demonstrated fatigue post treatment, exhibited good technique with therapeutic exercises, and would benefit from continued PT. No exacerbation of her symptoms noted throughout the session. Continued to progress pt based on her tolerance.       Plan: Progress note during next visit.  Progress treatment as tolerated.        Insurance:  AMA/CMS Eval/ Re-eval Auth #/ Referral # Total units or visits Start date  Expiration date KX? Visit limitation?  PT only or  PT+OT? Co-Insurance   CMS 24   BOMN     CMS 24     YES- starting August        POC Start Date POC Expiration Date Signed POC?   24 pending   24 pending      Date               Visits/Units:  Used               Authed:  Remaining                      Precautions: HTN, hx of cancer  HEP: Access Code [email or web address]  KX NEEDED  Date    Visit Number 13 12 11 10 9   Manuals        Knee PROM Flexion 120    Flexion supine & ext                           Neuro Re-Ed         Quad sets  2x10 LLE  3x10  3x10 LLE   LAQ        Clinic ambulation Using SPC 25ft 4 laps Using SPC 20 feet x 4, CS-CG      SLR 2x10 no assist 2x10 no assist 2x10 no assist 2x10 no assist 2x10 no assist   SAQ    2x10 3x10   LAQ  2x10 2x10 2x10 2x10   STS   From hi-low mat 1x10 (PT assistance on first 2)     bridges 2x10       Step ups Using 1HR and SPC w/6in 1x10    1HR no AD on 6in 1x10 Using 1HR and SPC with 6-inch step 25x (+) education and training 4in 1x10 LLE  6in 1x10 LLE     Mini squats 2x10  1x10     Bent knee fallout        Standing hip abd                Ther Ex        Heel slides 3x10 LLE 0-100 AA/PROM LLE 30x LLE 0-98 3x10, 5s hold w/SOS (97 degrees) 3x10, 5s hold (95 degrees) w/SOS 3x10, 5s hold (92 degrees)   Standing HS curl    Seated 2x10 Seated on hi-low mat 2x10   Standing heel raises        Side stepping        Ball squeeze        Vitals  144/87 mmHg 100 bpm      Recumbent bike    4 mins    Gs stretch    1x15, 5s hold 1x10, 10s hold   Ankle pumps        Seated HS str        Ther Activity                        Gait Training                        Modalities

## 2024-08-15 ENCOUNTER — OFFICE VISIT (OUTPATIENT)
Dept: PHYSICAL THERAPY | Facility: CLINIC | Age: 79
End: 2024-08-15
Payer: MEDICARE

## 2024-08-15 DIAGNOSIS — G89.29 CHRONIC PAIN OF LEFT KNEE: ICD-10-CM

## 2024-08-15 DIAGNOSIS — M25.562 CHRONIC PAIN OF LEFT KNEE: ICD-10-CM

## 2024-08-15 DIAGNOSIS — M17.12 ARTHRITIS OF LEFT KNEE: Primary | ICD-10-CM

## 2024-08-15 PROCEDURE — 97110 THERAPEUTIC EXERCISES: CPT

## 2024-08-15 PROCEDURE — 97112 NEUROMUSCULAR REEDUCATION: CPT

## 2024-08-15 NOTE — PROGRESS NOTES
Daily Note     Today's date: 8/15/2024  Patient name: Rachelle Fan  : 1945  MRN: [id number]  Referring provider: Santana Whitehead DO  Dx:   Encounter Diagnosis     ICD-10-CM    1. Arthritis of left knee  M17.12       2. Chronic pain of left knee  M25.562     G89.29           Start Time: 1705  Stop Time: 1745  Total time in clinic (min): 40 minutes    Subjective: Pt states she continues to feel tired with a little bit of knee pain but overall she is feeling good. Pt denies any significant changes since her last treatment session. No new complaints.       Objective: See treatment diary below      Assessment: Tolerated treatment well. Patient demonstrated fatigue post treatment, exhibited good technique with therapeutic exercises, and would benefit from continued PT. Incorporated more glute and lateral hip strengthening today to reduce knee valgus and reduce gait deviations. Attempted ambulating without an AD, pt was taking small steps with flat feet. Given VC to take equal and larger steps and allow the knee to flex and extend appropriately.       Plan: Continue per plan of care.  Progress treatment as tolerated.        Insurance:  AMA/CMS Eval/ Re-eval Auth #/ Referral # Total units or visits Start date  Expiration date KX? Visit limitation?  PT only or  PT+OT? Co-Insurance   CMS 24   BOMN     CMS 24     YES- starting August        POC Start Date POC Expiration Date Signed POC?   24 pending   24 pending      Date               Visits/Units:  Used               Authed:  Remaining                      Precautions: HTN, hx of cancer  HEP: Access Code [email or web address]  KX NEEDED  Date 8/15 8/13 8/12 8/8 8/6   Visit Number 14 13 12 11 10   Manuals        Knee PROM  Flexion 120                              Neuro Re-Ed         Quad sets   2x10 LLE  3x10   LAQ        Clinic ambulation No AD at bar 5 laps Using SPC 25ft 4 laps Using SPC 20 feet x 4, CS-CG     SLR 2x10 2x10 no  assist 2x10 no assist 2x10 no assist 2x10 no assist   SAQ     2x10   LAQ   2x10 2x10 2x10   STS 1x15 from hi-low mat no assistance   From hi-low mat 1x10 (PT assistance on first 2)    bridges 2x10 2x10      Step ups  Using 1HR and SPC w/6in 1x10    1HR no AD on 6in 1x10 Using 1HR and SPC with 6-inch step 25x (+) education and training 4in 1x10 LLE  6in 1x10 LLE    Mini squats  2x10  1x10    Bent knee fallout RTB 2x10 BLE       Standing hip abd                Ther Ex        Heel slides 3x10 3x10 LLE 0-100 AA/PROM LLE 30x LLE 0-98 3x10, 5s hold w/SOS (97 degrees) 3x10, 5s hold (95 degrees) w/SOS   Standing HS curl     Seated 2x10   Standing heel raises        Side stepping        Ball squeeze        Vitals   144/87 mmHg 100 bpm     Recumbent bike     4 mins   Gs stretch     1x15, 5s hold   Ankle pumps        Standing hip abd 1x15 BLE       Standing hip flex 2x10 BLE       Seated HS str        Ther Activity                        Gait Training                        Modalities

## 2024-08-19 ENCOUNTER — OFFICE VISIT (OUTPATIENT)
Dept: PHYSICAL THERAPY | Facility: CLINIC | Age: 79
End: 2024-08-19
Payer: MEDICARE

## 2024-08-19 ENCOUNTER — TELEPHONE (OUTPATIENT)
Age: 79
End: 2024-08-19

## 2024-08-19 DIAGNOSIS — M17.12 ARTHRITIS OF LEFT KNEE: Primary | ICD-10-CM

## 2024-08-19 DIAGNOSIS — M25.562 CHRONIC PAIN OF LEFT KNEE: ICD-10-CM

## 2024-08-19 DIAGNOSIS — G89.29 CHRONIC PAIN OF LEFT KNEE: ICD-10-CM

## 2024-08-19 PROCEDURE — 97112 NEUROMUSCULAR REEDUCATION: CPT

## 2024-08-19 RX ORDER — CELECOXIB 200 MG/1
200 CAPSULE ORAL DAILY
COMMUNITY
Start: 2024-07-18 | End: 2024-08-29

## 2024-08-19 RX ORDER — ASPIRIN 81 MG/1
81 TABLET ORAL 2 TIMES DAILY
COMMUNITY
Start: 2024-07-18

## 2024-08-19 RX ORDER — ACETAMINOPHEN 500 MG
1000 TABLET ORAL AS NEEDED
COMMUNITY
Start: 2024-07-18 | End: 2024-08-29

## 2024-08-19 RX ORDER — TRAMADOL HYDROCHLORIDE 50 MG/1
TABLET ORAL
COMMUNITY
Start: 2024-07-22

## 2024-08-19 NOTE — PROGRESS NOTES
"Daily Note     Today's date: 2024  Patient name: Rachelle Fan  : 1945  MRN: [id number]  Referring provider: Santana Whitehead DO  Dx:   Encounter Diagnosis     ICD-10-CM    1. Arthritis of left knee  M17.12       2. Chronic pain of left knee  M25.562     G89.29           Start Time: 1745  Stop Time:   Total time in clinic (min): 27 minutes    Subjective: Pt states continued lethargy since her previous session and stated today she felt she didn't have enough energy to even lift her toothbrush to brush her teeth. Pt still feels \"fogginess\" in her head as well as \"pressure\" today. Pt expressed potentially going to ER after her session due to continued fatigue and is scheduled to see her PCP this Thursday for bloodwork. Pt's  states talking to her psychiatrist and one of the medications she is currently on has the side effect of lethargy, and they were instructed to cut down the dose of her medication.       Objective: See treatment diary below      Assessment: Tolerated treatment fair. Patient demonstrated fatigue post treatment and would benefit from continued PT. Due to pt's continued lethargic behavior and lack of energy today, pt's session was cut short and were instructed to go to the ER to have her checked on now instead of waiting until Thursday, especially with the newer symptoms of feeling \"pressure\" in her head. Pt went through a few gentle quad strengthening exercises/ROM stretches before the session was stopped as it was observed pt was not completing to the level of previous sessions and she appeared more lethargic and worn down. Pt is to seek evaluation at the ER and report back before returning for her next session.       Plan: Continue per plan of care.  Progress treatment as tolerated.        Insurance:  AMA/CMS Eval/ Re-eval Auth #/ Referral # Total units or visits Start date  Expiration date KX? Visit limitation?  PT only or  PT+OT? Co-Insurance   CMS 24   BOMN  "    CMS 7/29/24     YES- starting August        POC Start Date POC Expiration Date Signed POC?   7/1/24 8/26/24 pending   7/29/24 9/26/24 pending      Date               Visits/Units:  Used               Authed:  Remaining                      Precautions: HTN, hx of cancer  HEP: Access Code [email or web address]  KX NEEDED  Date 8/19 8/15 8/13 8/12 8/8   Visit Number 15 14 13 12 11   Manuals        Knee PROM flexion  Flexion 120                             Neuro Re-Ed         Quad sets    2x10 LLE    Clinic ambulation  No AD at bar 5 laps Using SPC 25ft 4 laps Using SPC 20 feet x 4, CS-CG    SLR 1x15 2x10 2x10 no assist 2x10 no assist 2x10 no assist   SAQ        LAQ 1x10   2x10 2x10   STS  1x15 from hi-low mat no assistance   From hi-low mat 1x10 (PT assistance on first 2)   bridges  2x10 2x10     Step ups   Using 1HR and SPC w/6in 1x10    1HR no AD on 6in 1x10 Using 1HR and SPC with 6-inch step 25x (+) education and training 4in 1x10 LLE  6in 1x10 LLE   Mini squats   2x10  1x10   Bent knee fallout  RTB 2x10 BLE      Standing hip abd                Ther Ex        Heel slides 3x10 3x10 3x10 LLE 0-100 AA/PROM LLE 30x LLE 0-98 3x10, 5s hold w/SOS (97 degrees)   Standing HS curl        Standing heel raises        Side stepping        Ball squeeze        Vitals    144/87 mmHg 100 bpm    Recumbent bike 3 mins       Gs stretch        Ankle pumps        Standing hip abd  1x15 BLE      Standing hip flex  2x10 BLE      Seated HS str        Ther Activity                        Gait Training                        Modalities

## 2024-08-19 NOTE — TELEPHONE ENCOUNTER
Message sent via [email or web address].    Jolie Brandon, quick question, Rahcelle had her knee replacement surgery 3 weeks ago. Her knee is doing well but this past week she has been very lethargic, has no energy, not much of an appetite. Was wondering what could cause these symptoms. Was wondering if it was a combination of her medicine or something else. We appreciate any answers you could give. Thank you!     I called the pts  as he sent this message thru his [employer] (he will send anything thru her [email or web address] in the future). He just spoke with Dr Elias, the pts psychiatrist, as he was concerned it could be some of her meds causing this. She recommended he start cutting her risperidone 0.5 mg in half and to eventually wean her off of it as that can make her tired.    She was feeling fine after her surgery. Please advise.

## 2024-08-19 NOTE — TELEPHONE ENCOUNTER
I spoke to Jasvir and he scheduled Rachelle for an appt on his day off this Thursday with [facility].  No further action required

## 2024-08-19 NOTE — TELEPHONE ENCOUNTER
I would really like to see her for an appointment. I may want to do blood work here in the office if I feel it appropriate at that time.

## 2024-08-20 ENCOUNTER — APPOINTMENT (OUTPATIENT)
Dept: PHYSICAL THERAPY | Facility: CLINIC | Age: 79
End: 2024-08-20
Payer: MEDICARE

## 2024-08-22 ENCOUNTER — TRANSITIONAL CARE MANAGEMENT (OUTPATIENT)
Dept: FAMILY MEDICINE CLINIC | Facility: CLINIC | Age: 79
End: 2024-08-22

## 2024-08-22 ENCOUNTER — OFFICE VISIT (OUTPATIENT)
Dept: PHYSICAL THERAPY | Facility: CLINIC | Age: 79
End: 2024-08-22
Payer: MEDICARE

## 2024-08-22 ENCOUNTER — OFFICE VISIT (OUTPATIENT)
Dept: FAMILY MEDICINE CLINIC | Facility: CLINIC | Age: 79
End: 2024-08-22
Payer: MEDICARE

## 2024-08-22 VITALS
HEART RATE: 97 BPM | BODY MASS INDEX: 31.07 KG/M2 | OXYGEN SATURATION: 98 % | WEIGHT: 144 LBS | RESPIRATION RATE: 16 BRPM | SYSTOLIC BLOOD PRESSURE: 120 MMHG | HEIGHT: 57 IN | TEMPERATURE: 97.9 F | DIASTOLIC BLOOD PRESSURE: 84 MMHG

## 2024-08-22 DIAGNOSIS — I10 PRIMARY HYPERTENSION: ICD-10-CM

## 2024-08-22 DIAGNOSIS — M17.12 ARTHRITIS OF LEFT KNEE: Primary | ICD-10-CM

## 2024-08-22 DIAGNOSIS — R63.4 WEIGHT LOSS, NON-INTENTIONAL: ICD-10-CM

## 2024-08-22 DIAGNOSIS — G89.29 CHRONIC PAIN OF LEFT KNEE: ICD-10-CM

## 2024-08-22 DIAGNOSIS — R63.0 DECREASED APPETITE: Primary | ICD-10-CM

## 2024-08-22 DIAGNOSIS — M25.562 CHRONIC PAIN OF LEFT KNEE: ICD-10-CM

## 2024-08-22 DIAGNOSIS — R53.83 OTHER FATIGUE: ICD-10-CM

## 2024-08-22 DIAGNOSIS — E03.9 ACQUIRED HYPOTHYROIDISM: ICD-10-CM

## 2024-08-22 PROCEDURE — 99496 TRANSJ CARE MGMT HIGH F2F 7D: CPT | Performed by: NURSE PRACTITIONER

## 2024-08-22 PROCEDURE — 97112 NEUROMUSCULAR REEDUCATION: CPT

## 2024-08-22 RX ORDER — PNV NO.95/FERROUS FUM/FOLIC AC 28MG-0.8MG
1 TABLET ORAL DAILY
COMMUNITY

## 2024-08-22 NOTE — PROGRESS NOTES
Assessment/Plan:    1. Decreased appetite  2. Weight loss, non-intentional  3. Primary hypertension  4. Acquired hypothyroidism  5. Other fatigue    Pt was recently admitted into [facility] [place]. Will request CT scan and recent labs. Consider evaluating thyroid studies if not already completed during hospital stay. Available hospital consult has been reviewed and it was documented that the pt did have abnormal anion gap which normalized at discharge. She was not noted to have any signs of infection/leukocytosis.         Patient Instructions   Take supplements boost and ensure.     Return in about 1 week (around 8/29/2024) for Recheck.    Subjective:      Patient ID: Rachelle Fan is a 79 y.o. female.    Chief Complaint   Patient presents with    Transition of Care Management     ER/inpatient visit at [facility] [place] 8/19-8/20/2024       Pt reports that she has been feeling fatigued and she has not had any appetite. States that her symptom onset began after having orthopedic surgery for left knee replacement. Pt had a recent fall and was kept overnight with subsequent CT scan and lab work. Reports that she has been taking iron supplements. Denies nausea or vomiting, abdominal or diarrhea. Denies fever, chills, chest pain, shortness of breath.         The following portions of the patient's history were reviewed and updated as appropriate: allergies, current medications, past family history, past medical history, past social history, past surgical history and problem list.    Review of Systems   Constitutional:  Positive for appetite change (decreased weight) and unexpected weight change (weight loss). Negative for chills, fatigue and fever.   Respiratory:  Negative for cough, chest tightness and shortness of breath.    Cardiovascular:  Negative for chest pain.   Gastrointestinal:  Negative for abdominal pain, diarrhea, nausea and vomiting.   Genitourinary:  Negative for dysuria.         Current Outpatient Medications   Medication Sig  "Dispense Refill    acetaminophen (TYLENOL) 325 mg tablet Take 3 tablets (975 mg total) by mouth 2 (two) times a day (Patient taking differently: Take 975 mg by mouth every 6 (six) hours as needed for moderate pain)  0    acetaminophen (TYLENOL) 500 mg tablet Take 1,000 mg by mouth as needed for moderate pain      amLODIPine (NORVASC) 5 mg tablet Take 5 mg by mouth in the morning.      aspirin (ECOTRIN LOW STRENGTH) 81 mg EC tablet Take 81 mg by mouth 2 (two) times a day      CALCIUM PO Take 600 mg by mouth in the morning      celecoxib (CeleBREX) 200 mg capsule Take 200 mg by mouth daily      cholecalciferol (VITAMIN D3) 1,000 units tablet Take 2 tablets (2,000 Units total) by mouth daily  0    cyanocobalamin (VITAMIN B-12) 1000 MCG tablet Take 1 tablet (1,000 mcg total) by mouth daily Do not start before March 10, 2023.  0    Ferrous Sulfate (Iron) 325 (65 Fe) MG TABS Take 1 tablet by mouth daily      levothyroxine 88 mcg tablet Take 1 tablet (88 mcg total) by mouth daily 90 tablet 3    losartan (COZAAR) 100 MG tablet Take 25 mg by mouth daily      omeprazole (PriLOSEC) 20 mg delayed release capsule Take 20 mg by mouth daily before breakfast      risperiDONE (RisperDAL) 0.5 mg tablet Take 0.5 mg by mouth daily      traMADol (ULTRAM) 50 mg tablet TAKE 1 TABLET BY MOUTH EVERY 6 HOURS AS NEEDED FOR PAIN, FOR UP TO 5 DAYS, FOR RIGHT KNEE PAIN      amoxicillin (AMOXIL) 500 mg capsule  (Patient not taking: Reported on 8/22/2024)       No current facility-administered medications for this visit.       Objective:    /84 (BP Location: Left arm, Patient Position: Sitting, Cuff Size: Standard)   Pulse 97   Temp 97.9 °F (36.6 °C) (Temporal)   Resp 16   Ht 4' 9\" (1.448 m)   Wt 65.3 kg (144 lb)   SpO2 98%   BMI 31.16 kg/m²        Physical Exam  Vitals reviewed.   Constitutional:       General: She is not in acute distress.     Appearance: She is well-developed. She is not diaphoretic.   HENT:      Head: " Normocephalic and atraumatic.   Eyes:      General: Lids are normal.         Right eye: No discharge.         Left eye: No discharge.      Conjunctiva/sclera: Conjunctivae normal.   Neck:      Thyroid: No thyromegaly.   Cardiovascular:      Rate and Rhythm: Normal rate and regular rhythm.      Pulses: Normal pulses.      Heart sounds: Normal heart sounds, S1 normal and S2 normal.   Pulmonary:      Effort: Pulmonary effort is normal. No respiratory distress.      Breath sounds: Normal breath sounds. No decreased breath sounds, wheezing, rhonchi or rales.   Abdominal:      Palpations: Abdomen is soft.      Tenderness: There is no abdominal tenderness.   Musculoskeletal:      Cervical back: Normal range of motion and neck supple.      Right lower leg: No edema.      Left lower leg: No edema.   Lymphadenopathy:      Cervical: No cervical adenopathy.   Skin:     General: Skin is warm and dry.      Findings: No rash.   Neurological:      Mental Status: She is alert and oriented to person, place, and time.   Psychiatric:         Behavior: Behavior normal.         Thought Content: Thought content normal.         Judgment: Judgment normal.         TCM Call       Date and time call was made  8/22/2024  8:08 AM    Hospital care reviewed  Records not available  Patient reported hospital care/tests/labs performed    Patient was hospitialized at  Other (comment)    Comment  [facility]    Date of Admission  08/19/24    Date of discharge  08/20/24    Diagnosis  Fatigue    Disposition  Home    Were the patients medications reviewed and updated  Yes    Current Symptoms  Fatigue    Fatigue severity  Moderate          TCM Call       Post hospital issues  --  continuing fatigue and poor appetite    Should patient be enrolled in anticoag monitoring?  No    Scheduled for follow up?  Yes    Patients specialists  Other (comment)    Other specialists names  Dr Atkins    Did you obtain your prescribed medications  Yes    Do  you need help managing your prescriptions or medications  No    Is transportation to your appointment needed  No    I have advised the patient to call PCP with any new or worsening symptoms  Danay Chowdhury MA    Living Arrangements  Family members    Support System  Children    The type of support provided  Emotional; Physical    Do you have social support  Yes, as much as I need    Are you recieving any outpatient services  No    Are you recieving home care services  No    Are you using any community resources  No    Current waiver services  No    Have you fallen in the last 12 months  Yes    How many times  once    Interperter language line needed  No    Counseling  Patient    Counseling topics  Activities of daily living; Diagnostic results; Importance of RX compliance                YOANA Beltre

## 2024-08-22 NOTE — PROGRESS NOTES
Daily Note     Today's date: 2024  Patient name: Rachelle Fan  : 1945  MRN: [id number]  Referring provider: Santana Whitehead DO  Dx:   Encounter Diagnosis     ICD-10-CM    1. Arthritis of left knee  M17.12       2. Chronic pain of left knee  M25.562     G89.29           Start Time: 1740  Stop Time: 1825  Total time in clinic (min): 45 minutes    Subjective: Pt was admitted to the hospital and stayed overnight for observation due to her fatigue. Pt was said to have been dehydrated and malnourished as she had lost weight since the surgery. Pt saw her PCP recently and has to go back next week to go over her blood work and test results. Pt states having more energy since her stay in the hospital and is trying to drink more, including ensure, to help get the nutrients she needs.       Objective: See treatment diary below      Assessment: Tolerated treatment well. Patient demonstrated fatigue post treatment, exhibited good technique with therapeutic exercises, and would benefit from continued PT. Pt demonstrated better energy levels today and more strength, being able to complete her exercises at the same level she did previously. Pt instructed to stay hydrated throughout the day and make sure she is following the instructions from the hospital for proper nutrition.       Plan: Continue per plan of care.  Progress treatment as tolerated.        Insurance:  AMA/CMS Eval/ Re-eval Auth #/ Referral # Total units or visits Start date  Expiration date KX? Visit limitation?  PT only or  PT+OT? Co-Insurance   CMS 24   BOMN     CMS 24     YES- starting August        POC Start Date POC Expiration Date Signed POC?   24 pending   24 pending      Date               Visits/Units:  Used               Authed:  Remaining                      Precautions: HTN, hx of cancer  HEP: Access Code [email or web address]  KX NEEDED  Date 8/22 8/19 8/15 8/13 8/12   Visit Number 16 15 14 13 12   Manuals         Knee PROM  flexion  Flexion 120                            Neuro Re-Ed         Quad sets     2x10 LLE   Clinic ambulation No AD 3 laps 25ft  No AD at bar 5 laps Using SPC 25ft 4 laps Using SPC 20 feet x 4, CS-CG   SLR 2x10 1x15 2x10 2x10 no assist 2x10 no assist   SAQ        LAQ 2x10 1x10   2x10   STS From chair w/airex 1x10    From chair no airex 2x stopped due to R knee  1x15 from hi-low mat no assistance     bridges 2x10  2x10 2x10    Walking hurdles At bar 2 laps using 1UE support       Airex walking Lateral 2 laps w/2 UE support       Step ups    Using 1HR and SPC w/6in 1x10    1HR no AD on 6in 1x10 Using 1HR and SPC with 6-inch step 25x (+) education and training   Mini squats    2x10    Bent knee fallout RTB 2x10 BLE  RTB 2x10 BLE     Standing hip abd        Airex heel raises 3x10       Ther Ex        Heel slides 3x10 (0-101) 3x10 3x10 3x10 LLE 0-100 AA/PROM LLE 30x LLE 0-98   Standing HS curl        Standing heel raises        Side stepping        Ball squeeze        Vitals     144/87 mmHg 100 bpm   Recumbent bike  3 mins      Gs stretch        Ankle pumps        Standing hip abd   1x15 BLE     Standing hip flex   2x10 BLE     Seated HS str        Ther Activity                        Gait Training                        Modalities

## 2024-08-26 ENCOUNTER — OFFICE VISIT (OUTPATIENT)
Dept: PHYSICAL THERAPY | Facility: CLINIC | Age: 79
End: 2024-08-26
Payer: MEDICARE

## 2024-08-26 DIAGNOSIS — G89.29 CHRONIC PAIN OF LEFT KNEE: ICD-10-CM

## 2024-08-26 DIAGNOSIS — M17.12 ARTHRITIS OF LEFT KNEE: Primary | ICD-10-CM

## 2024-08-26 DIAGNOSIS — M25.562 CHRONIC PAIN OF LEFT KNEE: ICD-10-CM

## 2024-08-26 PROCEDURE — 97112 NEUROMUSCULAR REEDUCATION: CPT

## 2024-08-26 NOTE — PROGRESS NOTES
Daily Note     Today's date: 2024  Patient name: Rachelle Fan  : 1945  MRN: [id number]  Referring provider: Santana Whitehead DO  Dx:   Encounter Diagnosis     ICD-10-CM    1. Arthritis of left knee  M17.12       2. Chronic pain of left knee  M25.562     G89.29           Start Time: 1745  Stop Time: 1830  Total time in clinic (min): 45 minutes    Subjective: Pt reports continuing to have lower energy than normal but is better than what she was prior to going to the ER. Pt denies any knee pain, no new complaints.       Objective: See treatment diary below      Assessment: Tolerated treatment well. Patient demonstrated fatigue post treatment, exhibited good technique with therapeutic exercises, and would benefit from continued PT      Plan: Continue per plan of care.  Progress treatment as tolerated.        Insurance:  [employer]/CMS Eval/ Re-eval Auth #/ Referral # Total units or visits Start date  Expiration date KX? Visit limitation?  PT only or  PT+OT? Co-Insurance   CMS 24   BOMN     CMS 24     YES- starting August        POC Start Date POC Expiration Date Signed POC?   24 pending   24 pending      Date               Visits/Units:  Used               Authed:  Remaining                      Precautions: HTN, hx of cancer  HEP: Access Code [email or web address]  KX NEEDED  Date 8/26 8/22 8/19 8/15 8/13 8/12   Visit Number 17 16 15 14 13 12   Manuals         Knee PROM Flexion (112)  flexion  Flexion 120                               Neuro Re-Ed          Quad sets      2x10 LLE   Clinic ambulation No AD 3 laps 50ft No AD 3 laps 25ft  No AD at bar 5 laps Using SPC 25ft 4 laps Using SPC 20 feet x 4, CS-CG   SLR 2x10 2x10 1x15 2x10 2x10 no assist 2x10 no assist   SAQ         LAQ 2x10 w/4lbs 2x10 1x10   2x10   STS From chair w/airex 1x10    From chair w/airex & 10lbs 1x10    From chair w/airex & tidal tank 1x10 From chair w/airex 1x10    From chair no airex 2x stopped due to R knee   1x15 from hi-low mat no assistance     bridges 2x10 2x10  2x10 2x10    Walking hurdles  At bar 2 laps using 1UE support       Airex walking  Lateral 2 laps w/2 UE support       Step ups     Using 1HR and SPC w/6in 1x10    1HR no AD on 6in 1x10 Using 1HR and SPC with 6-inch step 25x (+) education and training   Mini squats     2x10    Bent knee fallout  RTB 2x10 BLE  RTB 2x10 BLE     Standing hip abd         Airex heel raises 3 ways 15x ea 3x10       Ther Ex         Heel slides 3x10 (0-105) 3x10 (0-101) 3x10 3x10 3x10 LLE 0-100 AA/PROM LLE 30x LLE 0-98   Standing HS curl 2x10 w/2lbs        Standing heel raises         Side stepping         Ball squeeze         Vitals      144/87 mmHg 100 bpm   Recumbent bike   3 mins      Gs stretch         Ankle pumps         Standing hip abd    1x15 BLE     Standing hip flex    2x10 BLE     Seated HS str         Ther Activity                           Gait Training                           Modalities

## 2024-08-27 ENCOUNTER — OFFICE VISIT (OUTPATIENT)
Dept: PHYSICAL THERAPY | Facility: CLINIC | Age: 79
End: 2024-08-27
Payer: MEDICARE

## 2024-08-27 DIAGNOSIS — M25.562 CHRONIC PAIN OF LEFT KNEE: ICD-10-CM

## 2024-08-27 DIAGNOSIS — M17.12 ARTHRITIS OF LEFT KNEE: Primary | ICD-10-CM

## 2024-08-27 DIAGNOSIS — G89.29 CHRONIC PAIN OF LEFT KNEE: ICD-10-CM

## 2024-08-27 PROCEDURE — 97112 NEUROMUSCULAR REEDUCATION: CPT

## 2024-08-27 NOTE — PROGRESS NOTES
Daily Note     Today's date: 2024  Patient name: Rachelle Fan  : 1945  MRN: [id number]  Referring provider: Santana Whitehead DO  Dx:   Encounter Diagnosis     ICD-10-CM    1. Arthritis of left knee  M17.12       2. Chronic pain of left knee  M25.562     G89.29           Start Time: 1702  Stop Time: 1745  Total time in clinic (min): 43 minutes    Subjective: Pt reports that she feels about the same fatigue level as she did last night but isn't experiencing any increased knee pain. Pt denies any pain prior to the start of her session, no new complaints.       Objective: See treatment diary below      Assessment: Tolerated treatment well. Patient demonstrated fatigue post treatment, exhibited good technique with therapeutic exercises, and would benefit from continued PT      Plan: Continue per plan of care.  Progress treatment as tolerated.        Insurance:  AMA/CMS Eval/ Re-eval Auth #/ Referral # Total units or visits Start date  Expiration date KX? Visit limitation?  PT only or  PT+OT? Co-Insurance   CMS 24   BOMN     CMS 24     YES- starting August        POC Start Date POC Expiration Date Signed POC?   24 pending   24 pending      Date               Visits/Units:  Used               Authed:  Remaining                      Precautions: HTN, hx of cancer  HEP: Access Code [email or web address]  KX NEEDED  Date 8/27 8/26 8/22 8/19 8/15 8/13   Visit Number 18 17 16 15 14 13   Manuals         Knee PROM Flexion (114) Flexion (112)  flexion  Flexion 120                              Neuro Re-Ed          Quad sets         Clinic ambulation No AD holding tidal tank 3 laps 50ft No AD 3 laps 50ft No AD 3 laps 25ft  No AD at bar 5 laps Using SPC 25ft 4 laps   SLR 2x10 2x10 2x10 1x15 2x10 2x10 no assist   SAQ         LAQ  2x10 w/4lbs 2x10 1x10     STS From chair w/airex & tidal tank 2x10 From chair w/airex 1x10    From chair w/airex & 10lbs 1x10    From chair w/airex & tidal tank  1x10 From chair w/airex 1x10    From chair no airex 2x stopped due to R knee  1x15 from hi-low mat no assistance    bridges  2x10 2x10  2x10 2x10   Walking hurdles   At bar 2 laps using 1UE support      Airex walking Lateral 4 laps no UE support  Lateral 2 laps w/2 UE support      Step ups 6in using 1HR up and down 1x10 ea    6in no HR up and down 1x10 ea     Using 1HR and SPC w/6in 1x10    1HR no AD on 6in 1x10   Mini squats      2x10   Bent knee fallout   RTB 2x10 BLE  RTB 2x10 BLE    Standing hip abd         Airex heel raises  3 ways 15x ea 3x10      Ther Ex         Heel slides 3x10 (0-104) 3x10 (0-105) 3x10 (0-101) 3x10 3x10 3x10 LLE 0-100   Standing HS curl  2x10 w/2lbs       Standing heel raises         Side stepping         Ball squeeze         Vitals         Recumbent bike    3 mins     Gs stretch         Ankle pumps         Standing hip abd     1x15 BLE    Standing hip flex     2x10 BLE    Seated HS str         Ther Activity                           Gait Training                           Modalities

## 2024-08-29 ENCOUNTER — OFFICE VISIT (OUTPATIENT)
Dept: FAMILY MEDICINE CLINIC | Facility: CLINIC | Age: 79
End: 2024-08-29
Payer: MEDICARE

## 2024-08-29 ENCOUNTER — EVALUATION (OUTPATIENT)
Dept: PHYSICAL THERAPY | Facility: CLINIC | Age: 79
End: 2024-08-29
Payer: MEDICARE

## 2024-08-29 VITALS
HEIGHT: 57 IN | OXYGEN SATURATION: 97 % | TEMPERATURE: 96.6 F | BODY MASS INDEX: 30.63 KG/M2 | RESPIRATION RATE: 20 BRPM | WEIGHT: 142 LBS | DIASTOLIC BLOOD PRESSURE: 88 MMHG | HEART RATE: 94 BPM | SYSTOLIC BLOOD PRESSURE: 128 MMHG

## 2024-08-29 DIAGNOSIS — R63.0 DECREASED APPETITE: ICD-10-CM

## 2024-08-29 DIAGNOSIS — R53.1 GENERALIZED WEAKNESS: ICD-10-CM

## 2024-08-29 DIAGNOSIS — R63.4 WEIGHT LOSS, NON-INTENTIONAL: ICD-10-CM

## 2024-08-29 DIAGNOSIS — G89.29 CHRONIC PAIN OF LEFT KNEE: ICD-10-CM

## 2024-08-29 DIAGNOSIS — M17.12 ARTHRITIS OF LEFT KNEE: Primary | ICD-10-CM

## 2024-08-29 DIAGNOSIS — E03.9 ACQUIRED HYPOTHYROIDISM: ICD-10-CM

## 2024-08-29 DIAGNOSIS — M25.562 CHRONIC PAIN OF LEFT KNEE: ICD-10-CM

## 2024-08-29 DIAGNOSIS — E78.2 MIXED HYPERLIPIDEMIA: ICD-10-CM

## 2024-08-29 DIAGNOSIS — Z00.00 MEDICARE ANNUAL WELLNESS VISIT, SUBSEQUENT: Primary | ICD-10-CM

## 2024-08-29 DIAGNOSIS — R73.03 PRE-DIABETES: ICD-10-CM

## 2024-08-29 DIAGNOSIS — R53.83 OTHER FATIGUE: ICD-10-CM

## 2024-08-29 DIAGNOSIS — I10 PRIMARY HYPERTENSION: ICD-10-CM

## 2024-08-29 PROBLEM — Z01.818 PRE-OPERATIVE CLEARANCE: Status: RESOLVED | Noted: 2024-06-26 | Resolved: 2024-08-29

## 2024-08-29 PROBLEM — Z96.652 H/O TOTAL KNEE REPLACEMENT, LEFT: Status: ACTIVE | Noted: 2024-07-26

## 2024-08-29 PROBLEM — E66.9 OBESITY (BMI 30.0-34.9): Status: ACTIVE | Noted: 2024-07-18

## 2024-08-29 PROBLEM — E66.811 OBESITY (BMI 30.0-34.9): Status: ACTIVE | Noted: 2024-07-18

## 2024-08-29 PROCEDURE — 99214 OFFICE O/P EST MOD 30 MIN: CPT | Performed by: NURSE PRACTITIONER

## 2024-08-29 PROCEDURE — G0439 PPPS, SUBSEQ VISIT: HCPCS | Performed by: NURSE PRACTITIONER

## 2024-08-29 PROCEDURE — 36415 COLL VENOUS BLD VENIPUNCTURE: CPT | Performed by: NURSE PRACTITIONER

## 2024-08-29 PROCEDURE — 97112 NEUROMUSCULAR REEDUCATION: CPT

## 2024-08-29 PROCEDURE — 97110 THERAPEUTIC EXERCISES: CPT

## 2024-08-29 NOTE — PROGRESS NOTES
Ambulatory Visit  Name: Rachelle Fan      : 1945      MRN: [id number]  Encounter Provider: YOANA Beltre  Encounter Date: 2024   Encounter department: [facility]    Assessment & Plan   1. Medicare annual wellness visit, subsequent  Comments:  Age appropriate screenings and recommendations discussed.  2. Pre-diabetes  Assessment & Plan:  A1c reviewed from hospital admission on 7/3/2024 - wnl.  Continue nutrition as discussed.   3. Mixed hyperlipidemia  Assessment & Plan:  No issues at this time.  Pt is due for fasting lab work. Will discuss at next follow up in 3 months.   4. Generalized weakness  5. Primary hypertension  Assessment & Plan:  BP stable in office today.  Continue medications as directed.   6. Acquired hypothyroidism  -     TSH, 3rd generation  -     T4, free  7. Weight loss, non-intentional  -     TSH, 3rd generation  -     T4, free  8. Decreased appetite  -     TSH, 3rd generation  -     T4, free  9. Other fatigue  -     TSH, 3rd generation  -     T4, free      Pt reports resolution in symptoms of decreased appetite and weight loss. Reports ongoing fatigue with sleep disturbance. Encourage nutrition and sleep hygiene. Will evaluate thyroid studies today.      Preventive health issues were discussed with patient, and age appropriate screening tests were ordered as noted in patient's After Visit Summary. Personalized health advice and appropriate referrals for health education or preventive services given if needed, as noted in patient's After Visit Summary.    History of Present Illness     Micki is an 79 year old female with hypertension, hyperlipidemia, hypothyroidism, obesity, prediabetes, osteoarthritis, history of breast cancer and history of anemia, who presents to the office for medicare annual wellness exam and recheck of fatigue, decreased appetite, weight loss and weakness. Pt reports that her symptoms of low appetite have resolved and  he is eating and drinking without issues. States that she continues to feel fatigued and does have issues sleeping at night. Reports that she continues to feel weak and tired all of the time.        Patient Care Team:  YOANA Beltre as PCP - General (Family Medicine)    Review of Systems   Constitutional:  Positive for fatigue. Negative for diaphoresis and fever.   HENT:  Negative for ear pain and hearing loss.    Eyes:  Negative for pain and visual disturbance.   Respiratory:  Negative for chest tightness and shortness of breath.    Cardiovascular:  Negative for chest pain, palpitations and leg swelling.   Gastrointestinal:  Negative for abdominal pain, constipation and diarrhea.   Genitourinary:  Negative for difficulty urinating.   Musculoskeletal:  Negative for arthralgias and myalgias.   Skin:  Negative for rash.   Neurological:  Positive for weakness. Negative for dizziness, numbness and headaches.   Psychiatric/Behavioral:  Negative for sleep disturbance.      Medical History Reviewed by provider this encounter:  Tobacco  Allergies  Meds  Problems  Med Hx  Surg Hx  Fam Hx       Annual Wellness Visit Questionnaire   Rachelle is here for her Subsequent Wellness visit. Last Medicare Wellness visit information reviewed, patient interviewed and updates made to the record today.      Health Risk Assessment:   Patient rates overall health as fair. Patient feels that their physical health rating is same. Patient is very satisfied with their life. Eyesight was rated as same. Hearing was rated as slightly worse. Patient feels that their emotional and mental health rating is same. Patients states they are never, rarely angry. Patient states they are sometimes unusually tired/fatigued. Pain experienced in the last 7 days has been some. Patient's pain rating has been 3/10. Patient states that she has experienced weight loss or gain in last 6 months.     Depression Screening:   PHQ-2 Score: 0      Fall  Risk Screening:   In the past year, patient has experienced: history of falling in past year    Number of falls: 1  Injured during fall?: Yes    Feels unsteady when standing or walking?: Yes    Worried about falling?: Yes      Urinary Incontinence Screening:   Patient has not leaked urine accidently in the last six months.     Home Safety:  Patient has trouble with stairs inside or outside of their home. Patient has working smoke alarms and has working carbon monoxide detector. Home safety hazards include: none.     Nutrition:   Current diet is Regular.     Medications:   Patient is currently taking over-the-counter supplements. OTC medications include: see medication list. Patient is able to manage medications.     Activities of Daily Living (ADLs)/Instrumental Activities of Daily Living (IADLs):   Walk and transfer into and out of bed and chair?: Yes  Dress and groom yourself?: Yes    Bathe or shower yourself?: Yes    Feed yourself? Yes  Do your laundry/housekeeping?: Yes  Manage your money, pay your bills and track your expenses?: Yes  Make your own meals?: Yes    Do your own shopping?: Yes    Previous Hospitalizations:   Any hospitalizations or ED visits within the last 12 months?: Yes    How many hospitalizations have you had in the last year?: 1-2    Advance Care Planning:   Living will: No    Durable POA for healthcare: No    Advanced directive: No    Advanced directive counseling given: Yes    ACP document given: Yes    End of Life Decisions reviewed with patient: Yes    Provider agrees with end of life decisions: Yes      Cognitive Screening:   Provider or family/friend/caregiver concerned regarding cognition?: No    PREVENTIVE SCREENINGS      Cardiovascular Screening:    General: Screening Not Indicated and History Lipid Disorder      Diabetes Screening:     General: Screening Current      Colorectal Cancer Screening:     General: Screening Not Indicated      Breast Cancer Screening:     General: History  Breast Cancer      Cervical Cancer Screening:    General: Screening Not Indicated      Osteoporosis Screening:    General: Screening Not Indicated      Lung Cancer Screening:     General: Screening Not Indicated      Hepatitis C Screening:    General: Screening Current    Screening, Brief Intervention, and Referral to Treatment (SBIRT)    Screening  Typical number of drinks in a day: 0  Typical number of drinks in a week: 0  Interpretation: Low risk drinking behavior.    AUDIT-C Screenin) How often did you have a drink containing alcohol in the past year? never  2) How many drinks did you have on a typical day when you were drinking in the past year? 0  3) How often did you have 6 or more drinks on one occasion in the past year? never    AUDIT-C Score: 0  Interpretation: Score 0-2 (female): Negative screen for alcohol misuse    Single Item Drug Screening:  How often have you used an illegal drug (including marijuana) or a prescription medication for non-medical reasons in the past year? never    Single Item Drug Screen Score: 0  Interpretation: Negative screen for possible drug use disorder    Other Counseling Topics:   Car/seat belt/driving safety, skin self-exam, sunscreen and calcium and vitamin D intake and regular weightbearing exercise.     Social Determinants of Health     Financial Resource Strain: Low Risk  (2024)    Received from [facility]    Overall Financial Resource Strain (CARDIA)     Difficulty of Paying Living Expenses: Not hard at all   Food Insecurity: No Food Insecurity (2024)    Hunger Vital Sign     Worried About Running Out of Food in the Last Year: Never true     Ran Out of Food in the Last Year: Never true   Transportation Needs: No Transportation Needs (2024)    PRAPARE - Transportation     Lack of Transportation (Medical): No     Lack of Transportation (Non-Medical): No   Housing Stability: Low Risk  (2024)    Housing Stability Vital Sign     Unable to Pay for  "Housing in the Last Year: No     Number of Times Moved in the Last Year: 0     Homeless in the Last Year: No   Utilities: Not At Risk (8/24/2024)    [facility] Utilities     Threatened with loss of utilities: No     No results found.    Objective     /88 (BP Location: Left arm, Patient Position: Sitting, Cuff Size: Large)   Pulse 94   Temp (!) 96.6 °F (35.9 °C) (Temporal)   Resp 20   Ht 4' 9\" (1.448 m)   Wt 64.4 kg (142 lb)   SpO2 97%   BMI 30.73 kg/m²     Physical Exam  Vitals reviewed.   Constitutional:       Appearance: Normal appearance.   HENT:      Head: Normocephalic and atraumatic.   Cardiovascular:      Rate and Rhythm: Normal rate and regular rhythm.      Pulses: Normal pulses.      Heart sounds: Normal heart sounds.   Pulmonary:      Effort: Pulmonary effort is normal.      Breath sounds: Normal breath sounds.   Neurological:      Mental Status: She is alert and oriented to person, place, and time.   Psychiatric:         Mood and Affect: Mood normal.       Administrative Statements   I have spent a total time of 40 minutes in caring for this patient on the day of the visit/encounter including Instructions for management, Patient and family education, Importance of tx compliance, Risk factor reductions, Impressions, Counseling / Coordination of care, Documenting in the medical record, and Reviewing / ordering tests, medicine, procedures  .    "

## 2024-08-29 NOTE — PROGRESS NOTES
PT Re-Evaluation     Today's date: 2024  Patient name: Rachelle Fan  : 1945  MRN: [id number]  Referring provider: Santana Whitehead DO  Dx:   Encounter Diagnosis     ICD-10-CM    1. Arthritis of left knee  M17.12       2. Chronic pain of left knee  M25.562     G89.29             Start Time: 1704  Stop Time: 1745  Total time in clinic (min): 41 minutes    Assessment  Impairments: abnormal coordination, abnormal gait, abnormal or restricted ROM, abnormal movement, activity intolerance, impaired balance, impaired physical strength, pain with function, safety issue, poor posture , poor body mechanics, unable to perform ADL, participation limitations, activity limitations and endurance  Symptom irritability: moderate    Assessment details: Rachelle Fan is a 79 y.o. female who presents to physical therapy for her 19th visit s/p left TKA, performed over 1 month ago. Pt demonstrated improved gait no longer needing the using of an AD, however continues to be asymmetric and painful. Pt also demonstrated improved knee A/PROM with pain, increased L knee MMT, improving function during squats and steps, and improving overall functionality. However, pt continues with pain, ROM restrictions, strength deficits, and mechanical compensations making it difficult for the pt to ambulate long distances, go up and down stairs on her own safely, and perform ADLs without difficulty. Pt was re-educated on her diagnosis, prognosis, POC, and HEP. Pt would continue to benefit from physical therapy services to decrease her pain, address her deficits, progress towards her goal, and return to her PLOF.        Understanding of Dx/Px/POC: good     Prognosis: good    Goals  Short Term Goals:  1) Pt will initiate and progress her HEP in 3-4 weeks.- Met  2) Pt will improve her knee MMT by 1 to improve her ADLs in 3-4 weeks.- Met  3) Pt will improve her knee extension ROM to 0 to improve her gait in 3-4 weeks.- Met    Long Term  Goals:  1) Pt will be able to go up and down 15 steps reciprocally with less than 3/10 pain in 6-8 weeks.- Progressing  2) Pt will be able to ambulate for at least 10-15 mins without an AD an less than 3/10 pain in 6-8 weeks.- Progressing  3) Pt will be able to perform her ADLs with less than 3/10 pain in 6-8 weeks. - Progressing    Plan  Patient would benefit from: PT eval and skilled physical therapy  Planned modality interventions: cryotherapy    Planned therapy interventions: activity modification, ADL retraining, joint mobilization, manual therapy, balance, balance/weight bearing training, body mechanics training, motor coordination training, neuromuscular re-education, coordination, patient/caregiver education, postural training, self care, strengthening, stretching, therapeutic activities, therapeutic exercise, flexibility, functional ROM exercises, gait training, graded exercise and home exercise program    Frequency: 2x week  Duration in weeks: 6  Plan of Care beginning date: 2024  Plan of Care expiration date: 10/9/2024  Treatment plan discussed with: patient and family        Subjective Evaluation    History of Present Illness  Mechanism of injury: 24 Update:  Pt states feeling very tired and week. She went to get blood work done with her PCP, was told it could be her thyroid that is causing her to feel so worn down. Pt has been making sure to eat and drink frequently throughout the day. Pt is now walking without an AD indoors but is utilizing her RW and she is outside. Pt    Patient Goals  Patient goals for therapy: decreased pain, improved balance, increased motion, return to sport/leisure activities, independence with ADLs/IADLs and increased strength    Pain  Current pain ratin  At best pain ratin  At worst pain ratin  Location: left knee  Quality: tight, dull ache and sharp  Aggravating factors: stair climbing and walking  Progression: worsening    Social Support  Steps to enter  "house: yes  Stairs in house: yes   Lives in: multiple-level home  Lives with: adult children    Employment status: not working      Objective     Observations   Left Knee   Positive for incision. Negative for edema.     Active Range of Motion   Left Knee   Flexion: 105 degrees   Extension: 0 degrees with pain    Right Knee   Flexion: 106 degrees   Extension: -5 degrees with pain    Passive Range of Motion   Left Knee   Flexion: 115 degrees with pain  Extension: 1 degrees with pain    Right Knee   Flexion: 115 degrees   Extension: -3 degrees     Strength/Myotome Testing     Left Knee   Flexion: 3+ (pain)  Extension: 4-    Right Knee   Flexion: 4-  Extension: 4-    Additional Strength Details  No assist for SLR, minor lag noted at times    Ambulation   Weight-Bearing Status   Weight-Bearing Status (Left): full weight bearing   Weight-Bearing Status (Right): full weight-bearing    Assistive device used: none    Ambulation: Level Surfaces   Ambulation with assistive device: independent  Ambulation without assistive device: independent    Ambulation: Stairs   Ascend stairs: independent  Pattern: non-reciprocal  Railings: one rail  Descend stairs: contact guard assist  Pattern: non-reciprocal  Railings: one rail    Observational Gait   Increased left stance time and right stance time. Decreased walking speed, stride length, left swing time, right swing time, left step length and right step length.   Left foot contact pattern: heel to toe  Right foot contact pattern: heel to toe  Left arm swing: decreased  Right arm swing: decreased  Base of support: decreased    Functional Assessment      Squat    Pain, left tibial anterior translation beyond toes and right tibial anterior translation beyond toes.     Forward Step Up 6\"   Left Leg  Pain, contralateral trunk side bending and increased contralateral push off.     Forward Step Down 6\"   Left Leg  Pain, contralateral trunk side bending and contralateral toe touch first. "     Single Leg Stance   Left: 2 seconds  Right: 3 seconds    Comments  6MWT: 775ft no AD or breaks  TUG: 15.70s using 1UE to push off legs  5xSTS: 23.39s no UE support                  Insurance:  AMA/CMS Eval/ Re-eval Auth #/ Referral # Total units or visits Start date  Expiration date KX? Visit limitation?  PT only or  PT+OT? Co-Insurance   CMS 7/1/24 7/1/24   BOMN     CMS 7/29/24     YES- starting August      CMS 8/29/24     YES        POC Start Date POC Expiration Date Signed POC?   7/1/24 8/26/24 pending   7/29/24 9/26/24 pending   8/29/24 10/9/24 pending      Date               Visits/Units:  Used               Authed:  Remaining                      Precautions: HTN, hx of cancer  HEP: Access Code [email or web address]  KX NEEDED  Date 8/29 8/27 8/26 8/22 8/19 8/15   Visit Number [phone] 16 15 14   Manuals         Knee PROM  Flexion (114) Flexion (112)  flexion                               Neuro Re-Ed          Quad sets         Clinic ambulation  No AD holding tidal tank 3 laps 50ft No AD 3 laps 50ft No AD 3 laps 25ft  No AD at bar 5 laps   SLR  2x10 2x10 2x10 1x15 2x10   SAQ         LAQ   2x10 w/4lbs 2x10 1x10    STS  From chair w/airex & tidal tank 2x10 From chair w/airex 1x10    From chair w/airex & 10lbs 1x10    From chair w/airex & tidal tank 1x10 From chair w/airex 1x10    From chair no airex 2x stopped due to R knee  1x15 from hi-low mat no assistance   bridges   2x10 2x10  2x10   Walking hurdles    At bar 2 laps using 1UE support     Airex walking  Lateral 4 laps no UE support  Lateral 2 laps w/2 UE support     Step ups  6in using 1HR up and down 1x10 ea    6in no HR up and down 1x10 ea       Mini squats         Bent knee fallout    RTB 2x10 BLE  RTB 2x10 BLE   Standing hip abd         Airex heel raises   3 ways 15x ea 3x10     Ther Ex         Heel slides  3x10 (0-104) 3x10 (0-105) 3x10 (0-101) 3x10 3x10   Standing HS curl   2x10 w/2lbs      Standing heel raises         Side stepping         Ball squeeze          Vitals         Recumbent bike     3 mins    Gs stretch         Ankle pumps         Standing hip abd      1x15 BLE   Standing hip flex      2x10 BLE   Seated HS str         Ther Activity                           Gait Training                           Modalities

## 2024-08-29 NOTE — LETTER
2024    Santana Whitehead DO  [address] [facility] [address]  [facility] [address]    Patient: Rachelle Fan   YOB: 1945   Date of Visit: 2024     Encounter Diagnosis     ICD-10-CM    1. Arthritis of left knee  M17.12       2. Chronic pain of left knee  M25.562     G89.29           Dear Dr. Whitehead:    Thank you for your recent referral of Rachelle Fan. Please review the attached evaluation summary from Rachelle's recent visit.     Please verify that you agree with the plan of care by signing the attached order.     If you have any questions or concerns, please do not hesitate to call.     I sincerely appreciate the opportunity to share in the care of one of your patients and hope to have another opportunity to work with you in the near future.       Sincerely,    Dick Butler, PT      Referring Provider:      I certify that I have read the below Plan of Care and certify the need for these services furnished under this plan of treatment while under my care.                    Santana Whitehead DO  [address] [address]  Via Fax: [phone]          PT Re-Evaluation     Today's date: 2024  Patient name: Rachelle Fan  : 1945  MRN: [id number]  Referring provider: Santana Whitehead DO  Dx:   Encounter Diagnosis     ICD-10-CM    1. Arthritis of left knee  M17.12       2. Chronic pain of left knee  M25.562     G89.29             Start Time: 1704  Stop Time: 1745  Total time in clinic (min): 41 minutes    Assessment  Impairments: abnormal coordination, abnormal gait, abnormal or restricted ROM, abnormal movement, activity intolerance, impaired balance, impaired physical strength, pain with function, safety issue, poor posture , poor body mechanics, unable to perform ADL, participation limitations, activity limitations and endurance  Symptom irritability: moderate    Assessment details: Rachelle Fan is a 79 y.o. female who presents to physical  therapy for her 19th visit s/p left TKA, performed over 1 month ago. Pt demonstrated improved gait no longer needing the using of an AD, however continues to be asymmetric and painful. Pt also demonstrated improved knee A/PROM with pain, increased L knee MMT, improving function during squats and steps, and improving overall functionality. However, pt continues with pain, ROM restrictions, strength deficits, and mechanical compensations making it difficult for the pt to ambulate long distances, go up and down stairs on her own safely, and perform ADLs without difficulty. Pt was re-educated on her diagnosis, prognosis, POC, and HEP. Pt would continue to benefit from physical therapy services to decrease her pain, address her deficits, progress towards her goal, and return to her PLOF.        Understanding of Dx/Px/POC: good     Prognosis: good    Goals  Short Term Goals:  1) Pt will initiate and progress her HEP in 3-4 weeks.- Met  2) Pt will improve her knee MMT by 1 to improve her ADLs in 3-4 weeks.- Met  3) Pt will improve her knee extension ROM to 0 to improve her gait in 3-4 weeks.- Met    Long Term Goals:  1) Pt will be able to go up and down 15 steps reciprocally with less than 3/10 pain in 6-8 weeks.- Progressing  2) Pt will be able to ambulate for at least 10-15 mins without an AD an less than 3/10 pain in 6-8 weeks.- Progressing  3) Pt will be able to perform her ADLs with less than 3/10 pain in 6-8 weeks. - Progressing    Plan  Patient would benefit from: PT eval and skilled physical therapy  Planned modality interventions: cryotherapy    Planned therapy interventions: activity modification, ADL retraining, joint mobilization, manual therapy, balance, balance/weight bearing training, body mechanics training, motor coordination training, neuromuscular re-education, coordination, patient/caregiver education, postural training, self care, strengthening, stretching, therapeutic activities, therapeutic exercise,  flexibility, functional ROM exercises, gait training, graded exercise and home exercise program    Frequency: 2x week  Duration in weeks: 6  Plan of Care beginning date: 2024  Plan of Care expiration date: 10/9/2024  Treatment plan discussed with: patient and family        Subjective Evaluation    History of Present Illness  Mechanism of injury: 24 Update:  Pt states feeling very tired and week. She went to get blood work done with her PCP, was told it could be her thyroid that is causing her to feel so worn down. Pt has been making sure to eat and drink frequently throughout the day. Pt is now walking without an AD indoors but is utilizing her RW and she is outside. Pt    Patient Goals  Patient goals for therapy: decreased pain, improved balance, increased motion, return to sport/leisure activities, independence with ADLs/IADLs and increased strength    Pain  Current pain ratin  At best pain ratin  At worst pain ratin  Location: left knee  Quality: tight, dull ache and sharp  Aggravating factors: stair climbing and walking  Progression: worsening    Social Support  Steps to enter house: yes  Stairs in house: yes   Lives in: multiple-level home  Lives with: adult children    Employment status: not working      Objective     Observations   Left Knee   Positive for incision. Negative for edema.     Active Range of Motion   Left Knee   Flexion: 105 degrees   Extension: 0 degrees with pain    Right Knee   Flexion: 106 degrees   Extension: -5 degrees with pain    Passive Range of Motion   Left Knee   Flexion: 115 degrees with pain  Extension: 1 degrees with pain    Right Knee   Flexion: 115 degrees   Extension: -3 degrees     Strength/Myotome Testing     Left Knee   Flexion: 3+ (pain)  Extension: 4-    Right Knee   Flexion: 4-  Extension: 4-    Additional Strength Details  No assist for SLR, minor lag noted at times    Ambulation   Weight-Bearing Status   Weight-Bearing Status (Left): full weight  "bearing   Weight-Bearing Status (Right): full weight-bearing    Assistive device used: none    Ambulation: Level Surfaces   Ambulation with assistive device: independent  Ambulation without assistive device: independent    Ambulation: Stairs   Ascend stairs: independent  Pattern: non-reciprocal  Railings: one rail  Descend stairs: contact guard assist  Pattern: non-reciprocal  Railings: one rail    Observational Gait   Increased left stance time and right stance time. Decreased walking speed, stride length, left swing time, right swing time, left step length and right step length.   Left foot contact pattern: heel to toe  Right foot contact pattern: heel to toe  Left arm swing: decreased  Right arm swing: decreased  Base of support: decreased    Functional Assessment      Squat    Pain, left tibial anterior translation beyond toes and right tibial anterior translation beyond toes.     Forward Step Up 6\"   Left Leg  Pain, contralateral trunk side bending and increased contralateral push off.     Forward Step Down 6\"   Left Leg  Pain, contralateral trunk side bending and contralateral toe touch first.     Single Leg Stance   Left: 2 seconds  Right: 3 seconds    Comments  6MWT: 775ft no AD or breaks  TUG: 15.70s using 1UE to push off legs  5xSTS: 23.39s no UE support                  Insurance:  AMA/CMS Eval/ Re-eval Auth #/ Referral # Total units or visits Start date  Expiration date KX? Visit limitation?  PT only or  PT+OT? Co-Insurance   CMS 7/1/24 7/1/24   BOMN     CMS 7/29/24     YES- starting August      CMS 8/29/24     YES        POC Start Date POC Expiration Date Signed POC?   7/1/24 8/26/24 pending   7/29/24 9/26/24 pending   8/29/24 10/9/24 pending      Date               Visits/Units:  Used               Authed:  Remaining                      Precautions: HTN, hx of cancer  HEP: Access Code [email or web address]  KX NEEDED  Date 8/29 8/27 8/26 8/22 8/19 8/15   Visit Number [phone] 16 15 14   Manuals         Knee PROM "  Flexion (114) Flexion (112)  flexion                               Neuro Re-Ed          Quad sets         Clinic ambulation  No AD holding tidal tank 3 laps 50ft No AD 3 laps 50ft No AD 3 laps 25ft  No AD at bar 5 laps   SLR  2x10 2x10 2x10 1x15 2x10   SAQ         LAQ   2x10 w/4lbs 2x10 1x10    STS  From chair w/airex & tidal tank 2x10 From chair w/airex 1x10    From chair w/airex & 10lbs 1x10    From chair w/airex & tidal tank 1x10 From chair w/airex 1x10    From chair no airex 2x stopped due to R knee  1x15 from hi-low mat no assistance   bridges   2x10 2x10  2x10   Walking hurdles    At bar 2 laps using 1UE support     Airex walking  Lateral 4 laps no UE support  Lateral 2 laps w/2 UE support     Step ups  6in using 1HR up and down 1x10 ea    6in no HR up and down 1x10 ea       Mini squats         Bent knee fallout    RTB 2x10 BLE  RTB 2x10 BLE   Standing hip abd         Airex heel raises   3 ways 15x ea 3x10     Ther Ex         Heel slides  3x10 (0-104) 3x10 (0-105) 3x10 (0-101) 3x10 3x10   Standing HS curl   2x10 w/2lbs      Standing heel raises         Side stepping         Ball squeeze         Vitals         Recumbent bike     3 mins    Gs stretch         Ankle pumps         Standing hip abd      1x15 BLE   Standing hip flex      2x10 BLE   Seated HS str         Ther Activity                           Gait Training                           Modalities

## 2024-08-29 NOTE — PATIENT INSTRUCTIONS
Medicare Preventive Visit Patient Instructions  Thank you for completing your Welcome to Medicare Visit or Medicare Annual Wellness Visit today. Your next wellness visit will be due in one year (8/30/2025).  The screening/preventive services that you may require over the next 5-10 years are detailed below. Some tests may not apply to you based off risk factors and/or age. Screening tests ordered at today's visit but not completed yet may show as past due. Also, please note that scanned in results may not display below.  Preventive Screenings:  Service Recommendations Previous Testing/Comments   Colorectal Cancer Screening  * Colonoscopy    * Fecal Occult Blood Test (FOBT)/Fecal Immunochemical Test (FIT)  * Fecal DNA/Cologuard Test  * Flexible Sigmoidoscopy Age: 45-75 years old   Colonoscopy: every 10 years (may be performed more frequently if at higher risk)  OR  FOBT/FIT: every 1 year  OR  Cologuard: every 3 years  OR  Sigmoidoscopy: every 5 years  Screening may be recommended earlier than age 45 if at higher risk for colorectal cancer. Also, an individualized decision between you and your healthcare provider will decide whether screening between the ages of 76-85 would be appropriate. Colonoscopy: Not on file  FOBT/FIT: 03/08/2023  Cologuard: Not on file  Sigmoidoscopy: Not on file          Breast Cancer Screening Age: 40+ years old  Frequency: every 1-2 years  Not required if history of left and right mastectomy Mammogram: Not on file    History Breast Cancer   Cervical Cancer Screening Between the ages of 21-29, pap smear recommended once every 3 years.   Between the ages of 30-65, can perform pap smear with HPV co-testing every 5 years.   Recommendations may differ for women with a history of total hysterectomy, cervical cancer, or abnormal pap smears in past. Pap Smear: Not on file    Screening Not Indicated   Hepatitis C Screening Once for adults born between 1945 and 1965  More frequently in patients at high  risk for Hepatitis C Hep C Antibody: 05/12/2022    Screening Current   Diabetes Screening 1-2 times per year if you're at risk for diabetes or have pre-diabetes Fasting glucose: 108 mg/dL (3/8/2023)  A1C: No results in last 5 years (No results in last 5 years)  Screening Current   Cholesterol Screening Once every 5 years if you don't have a lipid disorder. May order more often based on risk factors. Lipid panel: 12/01/2023    Screening Not Indicated  History Lipid Disorder     Other Preventive Screenings Covered by Medicare:  Abdominal Aortic Aneurysm (AAA) Screening: covered once if your at risk. You're considered to be at risk if you have a family history of AAA.  Lung Cancer Screening: covers low dose CT scan once per year if you meet all of the following conditions: (1) Age 55-77; (2) No signs or symptoms of lung cancer; (3) Current smoker or have quit smoking within the last 15 years; (4) You have a tobacco smoking history of at least 20 pack years (packs per day multiplied by number of years you smoked); (5) You get a written order from a healthcare provider.  Glaucoma Screening: covered annually if you're considered high risk: (1) You have diabetes OR (2) Family history of glaucoma OR (3)  aged 50 and older OR (4)  American aged 65 and older  Osteoporosis Screening: covered every 2 years if you meet one of the following conditions: (1) You're estrogen deficient and at risk for osteoporosis based off medical history and other findings; (2) Have a vertebral abnormality; (3) On glucocorticoid therapy for more than 3 months; (4) Have primary hyperparathyroidism; (5) On osteoporosis medications and need to assess response to drug therapy.   Last bone density test (DXA Scan): 06/01/2022.  HIV Screening: covered annually if you're between the age of 15-65. Also covered annually if you are younger than 15 and older than 65 with risk factors for HIV infection. For pregnant patients, it is  covered up to 3 times per pregnancy.    Immunizations:  Immunization Recommendations   Influenza Vaccine Annual influenza vaccination during flu season is recommended for all persons aged >= 6 months who do not have contraindications   Pneumococcal Vaccine   * Pneumococcal conjugate vaccine = PCV13 (Prevnar 13), PCV15 (Vaxneuvance), PCV20 (Prevnar 20)  * Pneumococcal polysaccharide vaccine = PPSV23 (Pneumovax) Adults 19-63 yo with certain risk factors or if 65+ yo  If never received any pneumonia vaccine: recommend Prevnar 20 (PCV20)  Give PCV20 if previously received 1 dose of PCV13 or PPSV23   Hepatitis B Vaccine 3 dose series if at intermediate or high risk (ex: diabetes, end stage renal disease, liver disease)   Respiratory syncytial virus (RSV) Vaccine - COVERED BY MEDICARE PART D  * RSVPreF3 (Arexvy) CDC recommends that adults 60 years of age and older may receive a single dose of RSV vaccine using shared clinical decision-making (SCDM)   Tetanus (Td) Vaccine - COST NOT COVERED BY MEDICARE PART B Following completion of primary series, a booster dose should be given every 10 years to maintain immunity against tetanus. Td may also be given as tetanus wound prophylaxis.   Tdap Vaccine - COST NOT COVERED BY MEDICARE PART B Recommended at least once for all adults. For pregnant patients, recommended with each pregnancy.   Shingles Vaccine (Shingrix) - COST NOT COVERED BY MEDICARE PART B  2 shot series recommended in those 19 years and older who have or will have weakened immune systems or those 50 years and older     Health Maintenance Due:      Topic Date Due   • Hepatitis C Screening  Completed     Immunizations Due:      Topic Date Due   • Pneumococcal Vaccine: 65+ Years (2 of 2 - PPSV23 or PCV20) 12/18/2019   • COVID-19 Vaccine (3 - 2023-24 season) 09/01/2023   • Influenza Vaccine (1) 09/01/2024     Advance Directives   What are advance directives?  Advance directives are legal documents that state your wishes  and plans for medical care. These plans are made ahead of time in case you lose your ability to make decisions for yourself. Advance directives can apply to any medical decision, such as the treatments you want, and if you want to donate organs.   What are the types of advance directives?  There are many types of advance directives, and each state has rules about how to use them. You may choose a combination of any of the following:  Living will:  This is a written record of the treatment you want. You can also choose which treatments you do not want, which to limit, and which to stop at a certain time. This includes surgery, medicine, IV fluid, and tube feedings.   Durable power of  for healthcare (DPAHC):  This is a written record that states who you want to make healthcare choices for you when you are unable to make them for yourself. This person, called a proxy, is usually a family member or a friend. You may choose more than 1 proxy.  Do not resuscitate (DNR) order:  A DNR order is used in case your heart stops beating or you stop breathing. It is a request not to have certain forms of treatment, such as CPR. A DNR order may be included in other types of advance directives.  Medical directive:  This covers the care that you want if you are in a coma, near death, or unable to make decisions for yourself. You can list the treatments you want for each condition. Treatment may include pain medicine, surgery, blood transfusions, dialysis, IV or tube feedings, and a ventilator (breathing machine).  Values history:  This document has questions about your views, beliefs, and how you feel and think about life. This information can help others choose the care that you would choose.  Why are advance directives important?  An advance directive helps you control your care. Although spoken wishes may be used, it is better to have your wishes written down. Spoken wishes can be misunderstood, or not followed.  Treatments may be given even if you do not want them. An advance directive may make it easier for your family to make difficult choices about your care.   Fall Prevention    Fall prevention  includes ways to make your home and other areas safer. It also includes ways you can move more carefully to prevent a fall. Health conditions that cause changes in your blood pressure, vision, or muscle strength and coordination may increase your risk for falls. Medicines may also increase your risk for falls if they make you dizzy, weak, or sleepy.   Fall prevention tips:   Stand or sit up slowly.    Use assistive devices as directed.    Wear shoes that fit well and have soles that .    Wear a personal alarm.    Stay active.    Manage your medical conditions.    Home Safety Tips:  Add items to prevent falls in the bathroom.    Keep paths clear.    Install bright lights in your home.    Keep items you use often on shelves within reach.    Paint or place reflective tape on the edges of your stairs.    Weight Management   Why it is important to manage your weight:  Being overweight increases your risk of health conditions such as heart disease, high blood pressure, type 2 diabetes, and certain types of cancer. It can also increase your risk for osteoarthritis, sleep apnea, and other respiratory problems. Aim for a slow, steady weight loss. Even a small amount of weight loss can lower your risk of health problems.  How to lose weight safely:  A safe and healthy way to lose weight is to eat fewer calories and get regular exercise. You can lose up about 1 pound a week by decreasing the number of calories you eat by 500 calories each day.   Healthy meal plan for weight management:  A healthy meal plan includes a variety of foods, contains fewer calories, and helps you stay healthy. A healthy meal plan includes the following:  Eat whole-grain foods more often.  A healthy meal plan should contain fiber. Fiber is the part of grains,  fruits, and vegetables that is not broken down by your body. Whole-grain foods are healthy and provide extra fiber in your diet. Some examples of whole-grain foods are whole-wheat breads and pastas, oatmeal, brown rice, and bulgur.  Eat a variety of vegetables every day.  Include dark, leafy greens such as spinach, kale, kobe greens, and mustard greens. Eat yellow and orange vegetables such as carrots, sweet potatoes, and winter squash.   Eat a variety of fruits every day.  Choose fresh or canned fruit (canned in its own juice or light syrup) instead of juice. Fruit juice has very little or no fiber.  Eat low-fat dairy foods.  Drink fat-free (skim) milk or 1% milk. Eat fat-free yogurt and low-fat cottage cheese. Try low-fat cheeses such as mozzarella and other reduced-fat cheeses.  Choose meat and other protein foods that are low in fat.  Choose beans or other legumes such as split peas or lentils. Choose fish, skinless poultry (chicken or turkey), or lean cuts of red meat (beef or pork). Before you cook meat or poultry, cut off any visible fat.   Use less fat and oil.  Try baking foods instead of frying them. Add less fat, such as margarine, sour cream, regular salad dressing and mayonnaise to foods. Eat fewer high-fat foods. Some examples of high-fat foods include french fries, doughnuts, ice cream, and cakes.  Eat fewer sweets.  Limit foods and drinks that are high in sugar. This includes candy, cookies, regular soda, and sweetened drinks.  Exercise:  Exercise at least 30 minutes per day on most days of the week. Some examples of exercise include walking, biking, dancing, and swimming. You can also fit in more physical activity by taking the stairs instead of the elevator or parking farther away from stores. Ask your healthcare provider about the best exercise plan for you.      © Copyright [employer] 2018 Information is for End User's use only and may not be sold, redistributed or otherwise used for  commercial purposes. All illustrations and images included in CareNotes® are the copyrighted property of [employer] or [employer]

## 2024-08-29 NOTE — ASSESSMENT & PLAN NOTE
No issues at this time.  Pt is due for fasting lab work. Will discuss at next follow up in 3 months.

## 2024-08-30 LAB
T4 FREE SERPL-MCNC: 1.5 NG/DL (ref 0.8–1.8)
TSH SERPL-ACNC: 1.24 MIU/L (ref 0.4–4.5)

## 2024-09-03 ENCOUNTER — OFFICE VISIT (OUTPATIENT)
Dept: PHYSICAL THERAPY | Facility: CLINIC | Age: 79
End: 2024-09-03
Payer: MEDICARE

## 2024-09-03 DIAGNOSIS — M25.562 CHRONIC PAIN OF LEFT KNEE: ICD-10-CM

## 2024-09-03 DIAGNOSIS — G89.29 CHRONIC PAIN OF LEFT KNEE: ICD-10-CM

## 2024-09-03 DIAGNOSIS — M17.12 ARTHRITIS OF LEFT KNEE: Primary | ICD-10-CM

## 2024-09-03 PROCEDURE — 97112 NEUROMUSCULAR REEDUCATION: CPT

## 2024-09-03 NOTE — PROGRESS NOTES
Daily Note     Today's date: 9/3/2024  Patient name: Rachelle Fan  : 1945  MRN: [id number]  Referring provider: Santana Whitehead DO  Dx:   Encounter Diagnosis     ICD-10-CM    1. Arthritis of left knee  M17.12       2. Chronic pain of left knee  M25.562     G89.29           Start Time: 1753  Stop Time: 1830  Total time in clinic (min): 37 minutes    Subjective: Pt reports that she is doing a little better today than what she was last week. Pt's  also reports she was able to go up the stairs on her own, with the use of the railing, without CG or his assistance. Pt states minimal knee discomfort prior to the start of her session.       Objective: See treatment diary below      Assessment: Tolerated treatment well. Patient demonstrated fatigue post treatment, exhibited good technique with therapeutic exercises, and would benefit from continued PT      Plan: Continue per plan of care.  Progress treatment as tolerated.        Insurance:  AMA/CMS Eval/ Re-eval Auth #/ Referral # Total units or visits Start date  Expiration date KX? Visit limitation?  PT only or  PT+OT? Co-Insurance   CMS 24   BOMN     CMS 24     YES- starting August      CMS 24     YES        POC Start Date POC Expiration Date Signed POC?   24 pending   24 pending   8/29/24 10/9/24 pending      Date               Visits/Units:  Used               Authed:  Remaining                      Precautions: HTN, hx of cancer  HEP: Access Code [email or web address]  KX NEEDED  Date 9/3 8/29 8/27 8/26 8/22 8/19   Visit Number 20 19 18 17 16 15   Manuals         Knee PROM   Flexion (114) Flexion (112)  flexion                              Neuro Re-Ed          Quad sets         Clinic ambulation   No AD holding tidal tank 3 laps 50ft No AD 3 laps 50ft No AD 3 laps 25ft    SLR 3x10  2x10 2x10 2x10 1x15   SAQ         LAQ 3x10 w/5lbs   2x10 w/4lbs 2x10 1x10   STS From airex 1x10    From airex w/tidal tank 1x10  From  chair w/airex & tidal tank 2x10 From chair w/airex 1x10    From chair w/airex & 10lbs 1x10    From chair w/airex & tidal tank 1x10 From chair w/airex 1x10    From chair no airex 2x stopped due to R knee    bridges 3x10   2x10 2x10    Walking hurdles     At bar 2 laps using 1UE support    Airex walking Lateral 10 laps w/minimal UE support  Lateral 4 laps no UE support  Lateral 2 laps w/2 UE support    Step ups 6in using table 2x10    Lateral step down/up 4in 2x10  6in using 1HR up and down 1x10 ea    6in no HR up and down 1x10 ea      Static lunge         Mini squats         Bent knee fallout     RTB 2x10 BLE    Standing hip abd         Airex heel raises    3 ways 15x ea 3x10    Ther Ex         Heel slides   3x10 (0-104) 3x10 (0-105) 3x10 (0-101) 3x10   Standing HS curl    2x10 w/2lbs     Standing heel raises         Side stepping         Ball squeeze         Vitals         Recumbent bike      3 mins   Gs stretch         Ankle pumps         Standing hip abd         Standing hip flex         Seated HS str         Ther Activity                           Gait Training                           Modalities

## 2024-09-05 ENCOUNTER — OFFICE VISIT (OUTPATIENT)
Dept: PHYSICAL THERAPY | Facility: CLINIC | Age: 79
End: 2024-09-05
Payer: MEDICARE

## 2024-09-05 DIAGNOSIS — G89.29 CHRONIC PAIN OF LEFT KNEE: ICD-10-CM

## 2024-09-05 DIAGNOSIS — M17.12 ARTHRITIS OF LEFT KNEE: Primary | ICD-10-CM

## 2024-09-05 DIAGNOSIS — M25.562 CHRONIC PAIN OF LEFT KNEE: ICD-10-CM

## 2024-09-05 PROCEDURE — 97112 NEUROMUSCULAR REEDUCATION: CPT | Performed by: PHYSICAL THERAPIST

## 2024-09-05 PROCEDURE — 97140 MANUAL THERAPY 1/> REGIONS: CPT | Performed by: PHYSICAL THERAPIST

## 2024-09-05 NOTE — PROGRESS NOTES
Daily Note     Today's date: 2024  Patient name: Rachelle Fan  : 1945  MRN: [id number]  Referring provider: Santana Whitehead DO  Dx:   Encounter Diagnosis     ICD-10-CM    1. Arthritis of left knee  M17.12       2. Chronic pain of left knee  M25.562     G89.29             Start Time: 1704  Stop Time: 1751  Total time in clinic (min): 47 minutes    Subjective: Client denies pain in knee upon arrival to clinic. She reports feeling a little tired. She uses the RW for community walking and no AD in the home.       Objective: See treatment diary below      Assessment: Tolerated treatment well. Session focused on promoting L knee flexion throughout standing activities. Min cues required to reduce increased L ankle eversion with step descent. She demonstrated fatigue post treatment, exhibited good technique with therapeutic exercises, and would benefit from continued PT      Plan: Continue per plan of care.  Progress treatment as tolerated.        Insurance:  AMA/CMS Eval/ Re-eval Auth #/ Referral # Total units or visits Start date  Expiration date KX? Visit limitation?  PT only or  PT+OT? Co-Insurance   CMS 24   BOMN     CMS 24     YES- starting August      CMS 24     YES        POC Start Date POC Expiration Date Signed POC?   24 pending   24 pending   8/29/24 10/9/24 pending      Date               Visits/Units:  Used               Authed:  Remaining                      Precautions: HTN, hx of cancer  HEP: Access Code [email or web address]  KX NEEDED  Date 9/5 9/3 8/29 8/27 8/26 8/22   Visit Number 21 20 19 18 17 16   Manuals         Knee PROM Flexion 105  Ext 3 degrees hyperext   Flexion (114) Flexion (112)                               Neuro Re-Ed          Quad sets         Clinic ambulation    No AD holding tidal tank 3 laps 50ft No AD 3 laps 50ft No AD 3 laps 25ft   SLR 2x15 3x10  2x10 2x10 2x10   SAQ         LAQ 2x15 w/5lbs 3x10 w/5lbs   2x10 w/4lbs 2x10   STS 2x10  to/from standard chair     W/ tidal tank 10x From airex 1x10    From airex w/tidal tank 1x10  From chair w/airex & tidal tank 2x10 From chair w/airex 1x10    From chair w/airex & 10lbs 1x10    From chair w/airex & tidal tank 1x10 From chair w/airex 1x10    From chair no airex 2x stopped due to R knee   bridges 3x10 3x10   2x10 2x10   Walking hurdles      At bar 2 laps using 1UE support   Airex walking 10 feet x 10 along airex beam 0-1 UE support Lateral 10 laps w/minimal UE support  Lateral 4 laps no UE support  Lateral 2 laps w/2 UE support   Step ups Step ups with descent anterior 2x15 (+) cues     6in using table 2x10    Lateral step down/up 4in 2x10  6in using 1HR up and down 1x10 ea    6in no HR up and down 1x10 ea     Static lunge         Mini squats         Bent knee fallout      RTB 2x10 BLE   Standing hip abd         Airex heel raises     3 ways 15x ea 3x10   Ther Ex         Heel slides    3x10 (0-104) 3x10 (0-105) 3x10 (0-101)   Standing HS curl 2x15 LLE    2x10 w/2lbs    Standing heel raises         Side stepping         Ball squeeze         Vitals         Recumbent bike         Gs stretch         Ankle pumps         Standing hip abd         Standing hip flex         Seated HS str         Ther Activity                           Gait Training                           Modalities

## 2024-09-09 ENCOUNTER — OFFICE VISIT (OUTPATIENT)
Dept: PHYSICAL THERAPY | Facility: CLINIC | Age: 79
End: 2024-09-09
Payer: MEDICARE

## 2024-09-09 DIAGNOSIS — M25.562 CHRONIC PAIN OF LEFT KNEE: ICD-10-CM

## 2024-09-09 DIAGNOSIS — M17.12 ARTHRITIS OF LEFT KNEE: Primary | ICD-10-CM

## 2024-09-09 DIAGNOSIS — G89.29 CHRONIC PAIN OF LEFT KNEE: ICD-10-CM

## 2024-09-09 PROCEDURE — 97110 THERAPEUTIC EXERCISES: CPT

## 2024-09-09 PROCEDURE — 97112 NEUROMUSCULAR REEDUCATION: CPT

## 2024-09-09 NOTE — PROGRESS NOTES
Daily Note     Today's date: 2024  Patient name: Rachelle Fan  : 1945  MRN: [id number]  Referring provider: Santana Whitehead DO  Dx:   Encounter Diagnosis     ICD-10-CM    1. Arthritis of left knee  M17.12       2. Chronic pain of left knee  M25.562     G89.29           Start Time: 1748  Stop Time: 1830  Total time in clinic (min): 42 minutes    Subjective: Pt reports that she saw her surgeon today for a follow-up and was told everything looks great. She got x-rays which revealed good healing and alignment of the knee. Pt has another follow-up with her surgeon in 6 weeks. No pain prior to the start of her treatment session, no new complaints.        Objective: See treatment diary below      Assessment: Tolerated treatment well. Patient demonstrated fatigue post treatment, exhibited good technique with therapeutic exercises, and would benefit from continued PT      Plan: Continue per plan of care.  Progress treatment as tolerated.        Insurance:  AMA/CMS Eval/ Re-eval Auth #/ Referral # Total units or visits Start date  Expiration date KX? Visit limitation?  PT only or  PT+OT? Co-Insurance   CMS 24   BOMN     CMS 24     YES- starting August      CMS 24     YES        POC Start Date POC Expiration Date Signed POC?   24 pending   24 pending   8/29/24 10/9/24 pending      Date               Visits/Units:  Used               Authed:  Remaining                      Precautions: HTN, hx of cancer  HEP: Access Code [email or web address]  KX NEEDED  Date 9/9 9/5 9/3 8/29 8/27 8/26   Visit Number 22 21 20 19 18 17   Manuals         Knee PROM  Flexion 105  Ext 3 degrees hyperext   Flexion (114) Flexion (112)                              Neuro Re-Ed          Quad sets         Clinic ambulation     No AD holding tidal tank 3 laps 50ft No AD 3 laps 50ft   SLR  2x15 3x10  2x10 2x10   SAQ         LAQ  2x15 w/5lbs 3x10 w/5lbs   2x10 w/4lbs   SLS 15x5s B        STS W/tidal tank  2x10 2x10 to/from standard chair     W/ tidal tank 10x From airex 1x10    From airex w/tidal tank 1x10  From chair w/airex & tidal tank 2x10 From chair w/airex 1x10    From chair w/airex & 10lbs 1x10    From chair w/airex & tidal tank 1x10   bridges  3x10 3x10   2x10   Walking hurdles         Airex walking Lateral and fwd 5 laps ea w/0-1 UE support at bar 10 feet x 10 along airex beam 0-1 UE support Lateral 10 laps w/minimal UE support  Lateral 4 laps no UE support    Step ups 6in 3x10    Lateral step up/down 6in 2x10 Step ups with descent anterior 2x15 (+) cues     6in using table 2x10    Lateral step down/up 4in 2x10  6in using 1HR up and down 1x10 ea    6in no HR up and down 1x10 ea    Static lunge 1x10 BLE        Mini squats         Bent knee fallout         Standing hip abd         Airex heel raises      3 ways 15x ea   Ther Ex         Heel slides     3x10 (0-104) 3x10 (0-105)   Standing HS curl 2x10 w/2lbs BLE 2x15 LLE    2x10 w/2lbs   Standing hip flexion 2x10 w/2lbs BLE        Standing heel raises         Side stepping         Ball squeeze         Vitals         Recumbent bike         Gs stretch         Ankle pumps         Standing hip abd         Standing hip flex         Seated HS str         Ther Activity                           Gait Training                           Modalities

## 2024-09-12 ENCOUNTER — OFFICE VISIT (OUTPATIENT)
Dept: PHYSICAL THERAPY | Facility: CLINIC | Age: 79
End: 2024-09-12
Payer: MEDICARE

## 2024-09-12 DIAGNOSIS — G89.29 CHRONIC PAIN OF LEFT KNEE: ICD-10-CM

## 2024-09-12 DIAGNOSIS — M17.12 ARTHRITIS OF LEFT KNEE: Primary | ICD-10-CM

## 2024-09-12 DIAGNOSIS — M25.562 CHRONIC PAIN OF LEFT KNEE: ICD-10-CM

## 2024-09-12 PROCEDURE — 97112 NEUROMUSCULAR REEDUCATION: CPT | Performed by: PHYSICAL THERAPIST

## 2024-09-12 PROCEDURE — 97110 THERAPEUTIC EXERCISES: CPT | Performed by: PHYSICAL THERAPIST

## 2024-09-12 NOTE — PROGRESS NOTES
Daily Note     Today's date: 2024  Patient name: Rachelle Fan  : 1945  MRN: [id number]  Referring provider: Santana Whitehead DO  Dx:   Encounter Diagnosis     ICD-10-CM    1. Arthritis of left knee  M17.12       2. Chronic pain of left knee  M25.562     G89.29           Start Time: 1700  Stop Time: 1745  Total time in clinic (min): 45 minutes    Subjective: Client arrives with reports on having some difficulties with stairs descent. She denies any pain or falls.      Objective: See treatment diary below      Assessment: Tolerated treatment well. Progressed reps for BLE strengthening exercises today with appropriate challenge. Treatment focused on improving knee flexion during closed and opened chained activities. She demonstrated fatigue post treatment, exhibited good technique with therapeutic exercises, and would benefit from continued PT      Plan: Continue per plan of care.  Progress treatment as tolerated.        Insurance:  AMA/CMS Eval/ Re-eval Auth #/ Referral # Total units or visits Start date  Expiration date KX? Visit limitation?  PT only or  PT+OT? Co-Insurance   CMS 24   BOMN     CMS 24     YES- starting August      CMS 24     YES        POC Start Date POC Expiration Date Signed POC?   24 yes   24 yes   8/29/24 10/9/24 pending      Date               Visits/Units:  Used               Authed:  Remaining                      Precautions: HTN, hx of cancer  HEP: Access Code [email or web address]  KX NEEDED  Date 9/12 9/9 9/5 9/3 8/29 8/27 8/26   Visit Number 23 22 21 20 19 18 17   Manuals          Knee PROM   Flexion 105  Ext 3 degrees hyperext   Flexion (114) Flexion (112)                                 Neuro Re-Ed           Quad sets          Clinic ambulation      No AD holding tidal tank 3 laps 50ft No AD 3 laps 50ft   SLR   2x15 3x10  2x10 2x10   SAQ          LAQ   2x15 w/5lbs 3x10 w/5lbs   2x10 w/4lbs   SLS  15x5s B        STS 30x to/from standard  chair  W/tidal tank 2x10 2x10 to/from standard chair     W/ tidal tank 10x From airex 1x10    From airex w/tidal tank 1x10  From chair w/airex & tidal tank 2x10 From chair w/airex 1x10    From chair w/airex & 10lbs 1x10    From chair w/airex & tidal tank 1x10   bridges   3x10 3x10   2x10   Walking hurdles          Airex walking 10 feet x 10 along airex beam 0-1 UE support    Tandem walking 10 feet x 10 Lateral and fwd 5 laps ea w/0-1 UE support at bar 10 feet x 10 along airex beam 0-1 UE support Lateral 10 laps w/minimal UE support  Lateral 4 laps no UE support    Step ups Step ups with descent anterior 2x15 (+) cues    Lateral step up/down 6in 2x10 6in 3x10    Lateral step up/down 6in 2x10 Step ups with descent anterior 2x15 (+) cues     6in using table 2x10    Lateral step down/up 4in 2x10  6in using 1HR up and down 1x10 ea    6in no HR up and down 1x10 ea    Static lunge 1x15 BLE 1x10 BLE        Mini squats          Bent knee fallout          Standing hip abd          Airex heel raises       3 ways 15x ea   Ther Ex          Heel slides      3x10 (0-104) 3x10 (0-105)   Standing HS curl 2x15 w/2lbs BLE 2x10 w/2lbs BLE 2x15 LLE    2x10 w/2lbs   Standing hip flexion 2x15 w/2lbs BLE 2x10 w/2lbs BLE        Standing heel raises          Side stepping          Ball squeeze          Vitals          Recumbent bike          Gs stretch          Ankle pumps          Standing hip abd          Standing hip flex          Seated HS str          Ther Activity                              Gait Training                              Modalities

## 2024-09-16 ENCOUNTER — OFFICE VISIT (OUTPATIENT)
Dept: PHYSICAL THERAPY | Facility: CLINIC | Age: 79
End: 2024-09-16
Payer: MEDICARE

## 2024-09-16 DIAGNOSIS — M25.562 CHRONIC PAIN OF LEFT KNEE: ICD-10-CM

## 2024-09-16 DIAGNOSIS — M17.12 ARTHRITIS OF LEFT KNEE: Primary | ICD-10-CM

## 2024-09-16 DIAGNOSIS — G89.29 CHRONIC PAIN OF LEFT KNEE: ICD-10-CM

## 2024-09-16 PROCEDURE — 97110 THERAPEUTIC EXERCISES: CPT

## 2024-09-16 PROCEDURE — 97112 NEUROMUSCULAR REEDUCATION: CPT

## 2024-09-16 NOTE — PROGRESS NOTES
Daily Note     Today's date: 2024  Patient name: Rachelle Fan  : 1945  MRN: [id number]  Referring provider: Santana Whitehead DO  Dx:   Encounter Diagnosis     ICD-10-CM    1. Arthritis of left knee  M17.12       2. Chronic pain of left knee  M25.562     G89.29           Start Time: 1703  Stop Time: 1745  Total time in clinic (min): 42 minutes    Subjective: Pt states that her L knee is doing well overall, her R knee has been bothering her more over the last few days. Pt describes having pain in the posterior aspect of her R knee and continues having that prior to the start of her session today.       Objective: See treatment diary below      Assessment: Tolerated treatment well. Patient demonstrated fatigue post treatment, exhibited good technique with therapeutic exercises, and would benefit from continued PT. Pt instructed on performing seated HS stretches when she feels posterior knee pain which helped alleviate her discomfort. Pt continues to demonstrate compensations when performing step downs, where she will ER her LLE and turn her trunk. Pt given VC for corrections which she was able to do better with more reps.       Plan: Continue per plan of care.  Progress treatment as tolerated.        Insurance:  AMA/CMS Eval/ Re-eval Auth #/ Referral # Total units or visits Start date  Expiration date KX? Visit limitation?  PT only or  PT+OT? Co-Insurance   CMS 24   BOMN     CMS 24     YES- starting August      CMS 24     YES        POC Start Date POC Expiration Date Signed POC?   24 yes   24 yes   8/29/24 10/9/24 pending      Date               Visits/Units:  Used               Authed:  Remaining                      Precautions: HTN, hx of cancer  HEP: Access Code [email or web address]  KX NEEDED  Date 9/16 9/12 9/9 9/5 9/3 8/29   Visit Number 24 23 22 21 20 19   Manuals         Knee PROM    Flexion 105  Ext 3 degrees hyperext                                Neuro  Re-Ed          Quad sets         Clinic ambulation         SLR    2x15 3x10    SAQ         LAQ    2x15 w/5lbs 3x10 w/5lbs    SLS   15x5s B      STS 30x to/from standard chair 30x to/from standard chair  W/tidal tank 2x10 2x10 to/from standard chair     W/ tidal tank 10x From airex 1x10    From airex w/tidal tank 1x10    bridges    3x10 3x10    Walking hurdles         Airex walking Lateral 10 laps w/0 UE support    Tandem walking 10 laps w/1 UE support 10 feet x 10 along airex beam 0-1 UE support    Tandem walking 10 feet x 10 Lateral and fwd 5 laps ea w/0-1 UE support at bar 10 feet x 10 along airex beam 0-1 UE support Lateral 10 laps w/minimal UE support    Step ups Lateral step up/down 6in 2x10 B Step ups with descent anterior 2x15 (+) cues    Lateral step up/down 6in 2x10 6in 3x10    Lateral step up/down 6in 2x10 Step ups with descent anterior 2x15 (+) cues     6in using table 2x10    Lateral step down/up 4in 2x10    Static lunge 1x15 BLE 1x15 BLE 1x10 BLE      Mini squats W/black discs 2x10        Bent knee fallout         Standing hip abd         Airex heel raises         Ther Ex         Heel slides         Standing HS curl 2x10 w/4lbs 2x15 w/2lbs BLE 2x10 w/2lbs BLE 2x15 LLE     Standing hip flexion 2x10 w/4lbs 2x15 w/2lbs BLE 2x10 w/2lbs BLE      Standing heel raises         Side stepping         Ball squeeze         Vitals         Recumbent bike         Gs stretch         Ankle pumps         Standing hip abd 2x10 BLE        Standing hip flex         Seated HS str         Ther Activity                           Gait Training                           Modalities

## 2024-09-19 ENCOUNTER — OFFICE VISIT (OUTPATIENT)
Dept: PHYSICAL THERAPY | Facility: CLINIC | Age: 79
End: 2024-09-19
Payer: MEDICARE

## 2024-09-19 DIAGNOSIS — M17.12 ARTHRITIS OF LEFT KNEE: Primary | ICD-10-CM

## 2024-09-19 DIAGNOSIS — M25.562 CHRONIC PAIN OF LEFT KNEE: ICD-10-CM

## 2024-09-19 DIAGNOSIS — G89.29 CHRONIC PAIN OF LEFT KNEE: ICD-10-CM

## 2024-09-19 PROCEDURE — 97112 NEUROMUSCULAR REEDUCATION: CPT

## 2024-09-19 PROCEDURE — 97110 THERAPEUTIC EXERCISES: CPT

## 2024-09-19 NOTE — PROGRESS NOTES
Daily Note     Today's date: 2024  Patient name: Rachelle Fan  : 1945  MRN: [id number]  Referring provider: Santana Whitehead DO  Dx:   Encounter Diagnosis     ICD-10-CM    1. Arthritis of left knee  M17.12       2. Chronic pain of left knee  M25.562     G89.29           Start Time: 1745  Stop Time: 1830  Total time in clinic (min): 45 minutes    Subjective: Pt reports that she feels a little fatigued today but overall her L knee feels good. However, she noticed what she thinks is a Baker's cyst forming behind her R knee. Originally it felt like it was bothering her but doesn't notice any pain today.       Objective: See treatment diary below      Assessment: Tolerated treatment well. Patient demonstrated fatigue post treatment, exhibited good technique with therapeutic exercises, and would benefit from continued PT. Pt's R posterior knee examined with what felt like a firm mass, however no pain or tenderness noted about it. Pt was instructed if her knee begins to bother her again to contact her surgeon.       Plan: Continue per plan of care.  Progress treatment as tolerated.        Insurance:  AMA/CMS Eval/ Re-eval Auth #/ Referral # Total units or visits Start date  Expiration date KX? Visit limitation?  PT only or  PT+OT? Co-Insurance   CMS 24   BOMN     CMS 24     YES- starting August      CMS 24     YES        POC Start Date POC Expiration Date Signed POC?   24 yes   24 yes   8/29/24 10/9/24 pending      Date               Visits/Units:  Used               Authed:  Remaining                      Precautions: HTN, hx of cancer  HEP: Access Code [email or web address]  KX NEEDED  Date 9/19 9/16 9/12 9/9 9/5 9/3   Visit Number 25 24 23 22 21 20   Manuals         Knee PROM     Flexion 105  Ext 3 degrees hyperext                               Neuro Re-Ed          Quad sets         Clinic ambulation         SLR     2x15 3x10   SAQ         LAQ     2x15 w/5lbs 3x10  w/5lbs   SLS 10x5s LLE   15x5s B     STS  30x to/from standard chair 30x to/from standard chair  W/tidal tank 2x10 2x10 to/from standard chair     W/ tidal tank 10x From airex 1x10    From airex w/tidal tank 1x10   bridges     3x10 3x10   Walking hurdles         Airex walking Lateral 10 laps w/0 UE support    Tandem walking 10 laps w/1 UE support Lateral 10 laps w/0 UE support    Tandem walking 10 laps w/1 UE support 10 feet x 10 along airex beam 0-1 UE support    Tandem walking 10 feet x 10 Lateral and fwd 5 laps ea w/0-1 UE support at bar 10 feet x 10 along airex beam 0-1 UE support Lateral 10 laps w/minimal UE support   Step ups Lateral step up/down 6in 1x15 B    Step up w/hip hike 2x10 B on 6in step Lateral step up/down 6in 2x10 B Step ups with descent anterior 2x15 (+) cues    Lateral step up/down 6in 2x10 6in 3x10    Lateral step up/down 6in 2x10 Step ups with descent anterior 2x15 (+) cues     6in using table 2x10    Lateral step down/up 4in 2x10   Static lunge  1x15 BLE 1x15 BLE 1x10 BLE     Mini squats W/black discs 3x10 W/black discs 2x10       Bent knee fallout         Standing hip abd         Airex heel raises         Ther Ex         Heel slides         Standing HS curl 2x10, 4lbs 2x10 w/4lbs 2x15 w/2lbs BLE 2x10 w/2lbs BLE 2x15 LLE    Standing hip flexion  2x10 w/4lbs 2x15 w/2lbs BLE 2x10 w/2lbs BLE     Standing heel raises         Side stepping         Ball squeeze         Vitals         Recumbent bike         Gs stretch         Ankle pumps         Standing hip abd 2x10 LLE w/4lbs 2x10 BLE       Standing hip flex         Seated HS str 3x30s B        Ther Activity                           Gait Training                           Modalities

## 2024-09-23 ENCOUNTER — OFFICE VISIT (OUTPATIENT)
Dept: PHYSICAL THERAPY | Facility: CLINIC | Age: 79
End: 2024-09-23
Payer: MEDICARE

## 2024-09-23 DIAGNOSIS — M25.562 CHRONIC PAIN OF LEFT KNEE: ICD-10-CM

## 2024-09-23 DIAGNOSIS — M17.12 ARTHRITIS OF LEFT KNEE: Primary | ICD-10-CM

## 2024-09-23 DIAGNOSIS — G89.29 CHRONIC PAIN OF LEFT KNEE: ICD-10-CM

## 2024-09-23 PROCEDURE — 97112 NEUROMUSCULAR REEDUCATION: CPT

## 2024-09-23 NOTE — PROGRESS NOTES
"Daily Note     Today's date: 2024  Patient name: Rachelle Fan  : 1945  MRN: [id number]  Referring provider: Santana Whitehead DO  Dx:   Encounter Diagnosis     ICD-10-CM    1. Arthritis of left knee  M17.12       2. Chronic pain of left knee  M25.562     G89.29           Start Time: 1750  Stop Time: 1830  Total time in clinic (min): 40 minutes    Subjective: Pt reports that her L knee is feeling good, she still has the \"bump\" on the back of her R knee but it isn't causing her any pain. Pt feels like she is doing better, she was able to clean around the house without any increases in knee pain.       Objective: See treatment diary below      Assessment: Tolerated treatment well. Patient demonstrated fatigue post treatment, exhibited good technique with therapeutic exercises, and would benefit from continued PT. Pt demonstrated progress with her step ups/downs and was progressed to the 8in step to continue working on her LE strengthening. Pt occasionally would compensate with trunk rotation and was given VC to help correct her form. Pt's exercises were modified at times to limit R knee pain/discomfort.       Plan: Continue per plan of care.  Progress treatment as tolerated.        Insurance:  AMA/CMS Eval/ Re-eval Auth #/ Referral # Total units or visits Start date  Expiration date KX? Visit limitation?  PT only or  PT+OT? Co-Insurance   CMS 24   BOMN     CMS 24     YES- starting August      CMS 24     YES        POC Start Date POC Expiration Date Signed POC?   24 yes   24 yes   8/29/24 10/9/24 pending      Date               Visits/Units:  Used               Authed:  Remaining                      Precautions: HTN, hx of cancer  HEP: Access Code [email or web address]  KX NEEDED  Date 9/23 9/19 9/16 9/12 9/9 9/5 9/3   Visit Number 26 25 24 23 22 21 20   Manuals          Knee PROM      Flexion 105  Ext 3 degrees hyperext                                  Neuro Re-Ed      "      Quad sets          Clinic ambulation          SLR      2x15 3x10   SAQ          LAQ      2x15 w/5lbs 3x10 w/5lbs   SLS W/3 way reach 10x ea BLE 10x5s LLE   15x5s B     STS W/tidal tank 3x10  30x to/from standard chair 30x to/from standard chair  W/tidal tank 2x10 2x10 to/from standard chair     W/ tidal tank 10x From airex 1x10    From airex w/tidal tank 1x10   bridges      3x10 3x10   Walking hurdles          Airex walking  Lateral 10 laps w/0 UE support    Tandem walking 10 laps w/1 UE support Lateral 10 laps w/0 UE support    Tandem walking 10 laps w/1 UE support 10 feet x 10 along airex beam 0-1 UE support    Tandem walking 10 feet x 10 Lateral and fwd 5 laps ea w/0-1 UE support at bar 10 feet x 10 along airex beam 0-1 UE support Lateral 10 laps w/minimal UE support   Step ups Lateral step up/down 8in 1x15 B Lateral step up/down 6in 1x15 B    Step up w/hip hike 2x10 B on 6in step Lateral step up/down 6in 2x10 B Step ups with descent anterior 2x15 (+) cues    Lateral step up/down 6in 2x10 6in 3x10    Lateral step up/down 6in 2x10 Step ups with descent anterior 2x15 (+) cues     6in using table 2x10    Lateral step down/up 4in 2x10   Static lunge   1x15 BLE 1x15 BLE 1x10 BLE     Mini squats W/black discs 3x10 W/black discs 3x10 W/black discs 2x10       Bent knee fallout          Standing hip abd          Airex heel raises          Ther Ex          Heel slides          Standing HS curl 2x10 w/4lbs 2x10, 4lbs 2x10 w/4lbs 2x15 w/2lbs BLE 2x10 w/2lbs BLE 2x15 LLE    Standing hip flexion   2x10 w/4lbs 2x15 w/2lbs BLE 2x10 w/2lbs BLE     Standing heel raises          Side stepping          Ball squeeze          Vitals          Recumbent bike          Gs stretch          Ankle pumps          Standing hip abd 2x10 LLE w/4lbs 2x10 LLE w/4lbs 2x10 BLE       Standing hip flex          Seated HS str 3x30s B 3x30s B        Ther Activity                              Gait Training                              Modalities

## 2024-09-26 ENCOUNTER — APPOINTMENT (OUTPATIENT)
Dept: PHYSICAL THERAPY | Facility: CLINIC | Age: 79
End: 2024-09-26
Payer: MEDICARE

## 2024-09-30 ENCOUNTER — OFFICE VISIT (OUTPATIENT)
Dept: PHYSICAL THERAPY | Facility: CLINIC | Age: 79
End: 2024-09-30
Payer: MEDICARE

## 2024-09-30 DIAGNOSIS — M17.12 ARTHRITIS OF LEFT KNEE: Primary | ICD-10-CM

## 2024-09-30 DIAGNOSIS — M25.562 CHRONIC PAIN OF LEFT KNEE: ICD-10-CM

## 2024-09-30 DIAGNOSIS — G89.29 CHRONIC PAIN OF LEFT KNEE: ICD-10-CM

## 2024-09-30 PROCEDURE — 97110 THERAPEUTIC EXERCISES: CPT

## 2024-09-30 PROCEDURE — 97112 NEUROMUSCULAR REEDUCATION: CPT

## 2024-09-30 NOTE — PROGRESS NOTES
Daily Note     Today's date: 2024  Patient name: Rachelle Fan  : 1945  MRN: [id number]  Referring provider: Santana Whitehead DO  Dx:   Encounter Diagnosis     ICD-10-CM    1. Arthritis of left knee  M17.12       2. Chronic pain of left knee  M25.562     G89.29           Start Time: 1747  Stop Time: 1830  Total time in clinic (min): 43 minutes    Subjective: Pt states that  her knee has been feeling fine lately, she feels her endurance is where it needs to be as she fatigues quickly. However, she is able to walk and go up and down steps without pain in her L knee. No new complaints.       Objective: See treatment diary below      Assessment: Tolerated treatment well. Patient demonstrated fatigue post treatment, exhibited good technique with therapeutic exercises, and would benefit from continued PT. Pt's HEP was updated and she was instructed on what to focus on as she transitions out of PT after her last session this upcoming Thursday. Pt continues to do well overall with improved strength and endurance, requiring VC occasionally to reduce compensations and make her fully engage her L quads.       Plan: Continue per plan of care.  Potential discharge next visit. Progress treatment as tolerated.        Insurance:  AMA/CMS Eval/ Re-eval Auth #/ Referral # Total units or visits Start date  Expiration date KX? Visit limitation?  PT only or  PT+OT? Co-Insurance   CMS 24   BOMN     CMS 24     YES- starting August      CMS 24     YES        POC Start Date POC Expiration Date Signed POC?   24 yes   24 yes   8/29/24 10/9/24 pending      Date               Visits/Units:  Used               Authed:  Remaining                      Precautions: HTN, hx of cancer  HEP: Access Code [email or web address]  KX NEEDED  Date    Visit Number [id number] 24 23 22   Manuals         Knee PROM                                    Neuro Re-Ed          Quad sets          Clinic ambulation         SLR         SAQ         LAQ         SLS  W/3 way reach 10x ea BLE 10x5s LLE   15x5s B   STS W/tidal tank 3x10 W/tidal tank 3x10  30x to/from standard chair 30x to/from standard chair  W/tidal tank 2x10   bridges         Walking hurdles         Airex walking   Lateral 10 laps w/0 UE support    Tandem walking 10 laps w/1 UE support Lateral 10 laps w/0 UE support    Tandem walking 10 laps w/1 UE support 10 feet x 10 along airex beam 0-1 UE support    Tandem walking 10 feet x 10 Lateral and fwd 5 laps ea w/0-1 UE support at bar   Step ups Lateral step up/down 8in 2x10 B    Fwd step up 2x10 8in Lateral step up/down 8in 1x15 B Lateral step up/down 6in 1x15 B    Step up w/hip hike 2x10 B on 6in step Lateral step up/down 6in 2x10 B Step ups with descent anterior 2x15 (+) cues    Lateral step up/down 6in 2x10 6in 3x10    Lateral step up/down 6in 2x10   Static lunge    1x15 BLE 1x15 BLE 1x10 BLE   Mini squats W/black discs 2x10 W/black discs 3x10 W/black discs 3x10 W/black discs 2x10     Bent knee fallout         Standing hip abd         Airex heel raises         Ther Ex         Heel slides         Standing HS curl 3x10 w/5lbs 2x10 w/4lbs 2x10, 4lbs 2x10 w/4lbs 2x15 w/2lbs BLE 2x10 w/2lbs BLE   Standing hip flexion    2x10 w/4lbs 2x15 w/2lbs BLE 2x10 w/2lbs BLE   Standing heel raises         Side stepping         Ball squeeze         Vitals         Recumbent bike         Gs stretch         Ankle pumps         Standing hip abd 2x10 LLE w/5lbs 2x10 LLE w/4lbs 2x10 LLE w/4lbs 2x10 BLE     Standing hip flex 2x10 LLE w/5lbs        Seated HS str  3x30s B 3x30s B      Ther Activity                           Gait Training                           Modalities

## 2024-10-02 ENCOUNTER — APPOINTMENT (OUTPATIENT)
Dept: RADIOLOGY | Facility: CLINIC | Age: 79
End: 2024-10-02
Payer: MEDICARE

## 2024-10-02 ENCOUNTER — OFFICE VISIT (OUTPATIENT)
Dept: FAMILY MEDICINE CLINIC | Facility: CLINIC | Age: 79
End: 2024-10-02
Payer: MEDICARE

## 2024-10-02 ENCOUNTER — TELEPHONE (OUTPATIENT)
Dept: FAMILY MEDICINE CLINIC | Facility: CLINIC | Age: 79
End: 2024-10-02

## 2024-10-02 VITALS
TEMPERATURE: 97.5 F | SYSTOLIC BLOOD PRESSURE: 110 MMHG | HEART RATE: 94 BPM | BODY MASS INDEX: 29.12 KG/M2 | OXYGEN SATURATION: 97 % | DIASTOLIC BLOOD PRESSURE: 70 MMHG | HEIGHT: 57 IN | RESPIRATION RATE: 20 BRPM | WEIGHT: 135 LBS

## 2024-10-02 DIAGNOSIS — Z12.31 ENCOUNTER FOR SCREENING MAMMOGRAM FOR MALIGNANT NEOPLASM OF BREAST: ICD-10-CM

## 2024-10-02 DIAGNOSIS — M25.432 PAIN AND SWELLING OF LEFT WRIST: Primary | ICD-10-CM

## 2024-10-02 DIAGNOSIS — M25.432 PAIN AND SWELLING OF LEFT WRIST: ICD-10-CM

## 2024-10-02 DIAGNOSIS — M25.532 PAIN AND SWELLING OF LEFT WRIST: Primary | ICD-10-CM

## 2024-10-02 DIAGNOSIS — M25.532 PAIN AND SWELLING OF LEFT WRIST: ICD-10-CM

## 2024-10-02 PROCEDURE — 96372 THER/PROPH/DIAG INJ SC/IM: CPT | Performed by: NURSE PRACTITIONER

## 2024-10-02 PROCEDURE — 73110 X-RAY EXAM OF WRIST: CPT

## 2024-10-02 PROCEDURE — 99213 OFFICE O/P EST LOW 20 MIN: CPT | Performed by: NURSE PRACTITIONER

## 2024-10-02 RX ORDER — KETOROLAC TROMETHAMINE 30 MG/ML
15 INJECTION, SOLUTION INTRAMUSCULAR; INTRAVENOUS ONCE
Status: COMPLETED | OUTPATIENT
Start: 2024-10-02 | End: 2024-10-02

## 2024-10-02 RX ADMIN — KETOROLAC TROMETHAMINE 15 MG: 30 INJECTION, SOLUTION INTRAMUSCULAR; INTRAVENOUS at 11:19

## 2024-10-02 NOTE — PROGRESS NOTES
Assessment/Plan:    1. Pain and swelling of left wrist  -     ketorolac (TORADOL) injection 15 mg  2. Encounter for screening mammogram for malignant neoplasm of breast  -     Mammo screening bilateral w 3d and cad; Future    Rest, ice and elevate the wrist.         Return if symptoms worsen or fail to improve.    Subjective:      Patient ID: Rachelle Fan is a 79 y.o. female.    Chief Complaint   Patient presents with    Hand Pain     Pt here for left hand pain and swelling       Wrist Pain   The pain is present in the left wrist. This is a new problem. The current episode started yesterday. The problem occurs constantly. The problem has been unchanged. The quality of the pain is described as aching. The pain is mild. Associated symptoms include joint swelling and a limited range of motion. Pertinent negatives include no fever. The symptoms are aggravated by activity.       The following portions of the patient's history were reviewed and updated as appropriate: allergies, current medications, past family history, past medical history, past social history, past surgical history and problem list.    Review of Systems   Constitutional:  Negative for chills, fatigue and fever.   Respiratory:  Negative for cough and shortness of breath.    Cardiovascular:  Negative for chest pain.   Musculoskeletal:  Positive for arthralgias and joint swelling.         Current Outpatient Medications   Medication Sig Dispense Refill    acetaminophen (TYLENOL) 325 mg tablet Take 3 tablets (975 mg total) by mouth 2 (two) times a day (Patient taking differently: Take 975 mg by mouth every 6 (six) hours as needed for moderate pain)  0    amLODIPine (NORVASC) 5 mg tablet Take 5 mg by mouth in the morning.      CALCIUM PO Take 600 mg by mouth in the morning      cholecalciferol (VITAMIN D3) 1,000 units tablet Take 2 tablets (2,000 Units total) by mouth daily  0    cyanocobalamin (VITAMIN B-12) 1000 MCG tablet Take 1 tablet (1,000 mcg total)  "by mouth daily Do not start before March 10, 2023.  0    levothyroxine 88 mcg tablet Take 1 tablet (88 mcg total) by mouth daily 90 tablet 3    losartan (COZAAR) 100 MG tablet Take 25 mg by mouth daily      amoxicillin (AMOXIL) 500 mg capsule  (Patient not taking: Reported on 8/22/2024)      celecoxib (CeleBREX) 200 mg capsule Take 200 mg by mouth daily       No current facility-administered medications for this visit.       Objective:    /70 (BP Location: Left arm, Patient Position: Sitting, Cuff Size: Large)   Pulse 94   Temp 97.5 °F (36.4 °C) (Temporal)   Resp 20   Ht 4' 9\" (1.448 m)   Wt 61.2 kg (135 lb)   SpO2 97%   BMI 29.21 kg/m²        Physical Exam  Vitals reviewed.   Constitutional:       Appearance: Normal appearance.   HENT:      Head: Normocephalic and atraumatic.   Musculoskeletal:      Left wrist: Swelling and tenderness present. Decreased range of motion.   Neurological:      Mental Status: She is alert and oriented to person, place, and time.   Psychiatric:         Mood and Affect: Mood normal.                YOANA Beltre  "

## 2024-10-03 NOTE — TELEPHONE ENCOUNTER
My apologies, I sent that too early!     Her xray shows severe arthritis and bone spurring of the left wrist. She needs to see our hand and wrist specialist. She would benefit from MRI evaluation as well for further look into the issue. Does she have any metal hardware/joints?     Roma Luevano

## 2024-10-03 NOTE — TELEPHONE ENCOUNTER
Informed Rachelle of her results per Roma.  Rachelle states she has metal in L knee & R shoulder.  Rachelle states she sees ortho in [place] and agreed to call them to schedule with their hand specialist.

## 2024-12-03 ENCOUNTER — OFFICE VISIT (OUTPATIENT)
Dept: FAMILY MEDICINE CLINIC | Facility: CLINIC | Age: 79
End: 2024-12-03
Payer: MEDICARE

## 2024-12-03 VITALS
TEMPERATURE: 97.9 F | SYSTOLIC BLOOD PRESSURE: 118 MMHG | OXYGEN SATURATION: 98 % | DIASTOLIC BLOOD PRESSURE: 74 MMHG | WEIGHT: 132 LBS | RESPIRATION RATE: 16 BRPM | BODY MASS INDEX: 28.48 KG/M2 | HEIGHT: 57 IN | HEART RATE: 88 BPM

## 2024-12-03 DIAGNOSIS — R63.4 WEIGHT LOSS, NON-INTENTIONAL: ICD-10-CM

## 2024-12-03 DIAGNOSIS — I10 PRIMARY HYPERTENSION: Primary | ICD-10-CM

## 2024-12-03 DIAGNOSIS — R53.1 GENERALIZED WEAKNESS: ICD-10-CM

## 2024-12-03 PROCEDURE — 99213 OFFICE O/P EST LOW 20 MIN: CPT | Performed by: NURSE PRACTITIONER

## 2024-12-03 PROCEDURE — G2211 COMPLEX E/M VISIT ADD ON: HCPCS | Performed by: NURSE PRACTITIONER

## 2024-12-03 NOTE — PROGRESS NOTES
Assessment/Plan:    1. Primary hypertension  Assessment & Plan:  BP wnl in office today.   2. Generalized weakness  Assessment & Plan:  CURRENTLY RESOLVED  3. Weight loss, non-intentional  Comments:  CURRENTLY RESOLVED            Return in about 6 months (around 6/3/2025) for Recheck.    Subjective:      Patient ID: Rachelle Fan is a 79 y.o. female.    Chief Complaint   Patient presents with    Follow-up     2 month re-check RH       Rachelle is an 79 year old female who presents to the office for a 3 month recheck. Pt was having episode of decreased appetite, weight loss and auditory hallucinations. Reports that she is no longer having generalized weakness. States that she eats and does not have any issues with appetite but she does report that she does not have a large appetite.         The following portions of the patient's history were reviewed and updated as appropriate: allergies, current medications, past family history, past medical history, past social history, past surgical history and problem list.    Review of Systems   Constitutional:  Negative for appetite change, fatigue and fever.   Respiratory:  Negative for shortness of breath.    Cardiovascular:  Negative for chest pain.         Current Outpatient Medications   Medication Sig Dispense Refill    acetaminophen (TYLENOL) 325 mg tablet Take 3 tablets (975 mg total) by mouth 2 (two) times a day (Patient taking differently: Take 975 mg by mouth every 6 (six) hours as needed for moderate pain)  0    amLODIPine (NORVASC) 5 mg tablet Take 5 mg by mouth in the morning.      CALCIUM PO Take 600 mg by mouth in the morning      celecoxib (CeleBREX) 200 mg capsule Take 200 mg by mouth daily      cholecalciferol (VITAMIN D3) 1,000 units tablet Take 2 tablets (2,000 Units total) by mouth daily  0    cyanocobalamin (VITAMIN B-12) 1000 MCG tablet Take 1 tablet (1,000 mcg total) by mouth daily Do not start before March 10, 2023.  0    levothyroxine 88 mcg tablet  "Take 1 tablet (88 mcg total) by mouth daily 90 tablet 3    losartan (COZAAR) 100 MG tablet Take 25 mg by mouth daily      amoxicillin (AMOXIL) 500 mg capsule  (Patient not taking: Reported on 8/22/2024)       No current facility-administered medications for this visit.       Objective:    /74 (BP Location: Left arm, Patient Position: Sitting, Cuff Size: Standard)   Pulse 88   Temp 97.9 °F (36.6 °C) (Temporal)   Resp 16   Ht 4' 9\" (1.448 m)   Wt 59.9 kg (132 lb)   SpO2 98%   BMI 28.56 kg/m²        Physical Exam  Vitals reviewed.   Constitutional:       Appearance: Normal appearance.   HENT:      Head: Normocephalic and atraumatic.   Cardiovascular:      Rate and Rhythm: Normal rate and regular rhythm.      Heart sounds: Normal heart sounds.   Pulmonary:      Effort: Pulmonary effort is normal.      Breath sounds: Normal breath sounds.   Neurological:      Mental Status: She is alert and oriented to person, place, and time.   Psychiatric:         Mood and Affect: Mood normal.                YOANA Beltre  "

## 2025-02-24 ENCOUNTER — OFFICE VISIT (OUTPATIENT)
Dept: FAMILY MEDICINE CLINIC | Facility: CLINIC | Age: 80
End: 2025-02-24
Payer: MEDICARE

## 2025-02-24 VITALS
HEIGHT: 57 IN | OXYGEN SATURATION: 97 % | RESPIRATION RATE: 18 BRPM | HEART RATE: 90 BPM | DIASTOLIC BLOOD PRESSURE: 80 MMHG | WEIGHT: 123.4 LBS | BODY MASS INDEX: 26.62 KG/M2 | SYSTOLIC BLOOD PRESSURE: 120 MMHG | TEMPERATURE: 97.1 F

## 2025-02-24 DIAGNOSIS — E78.2 MIXED HYPERLIPIDEMIA: ICD-10-CM

## 2025-02-24 DIAGNOSIS — M25.551 RIGHT HIP PAIN: Primary | ICD-10-CM

## 2025-02-24 DIAGNOSIS — M54.16 LUMBAR RADICULOPATHY: ICD-10-CM

## 2025-02-24 DIAGNOSIS — I10 PRIMARY HYPERTENSION: ICD-10-CM

## 2025-02-24 PROBLEM — R53.1 GENERALIZED WEAKNESS: Status: RESOLVED | Noted: 2023-03-07 | Resolved: 2025-02-24

## 2025-02-24 PROBLEM — M71.21 BAKER'S CYST, RIGHT: Status: RESOLVED | Noted: 2024-05-16 | Resolved: 2025-02-24

## 2025-02-24 PROBLEM — M79.605 BILATERAL LEG PAIN: Status: RESOLVED | Noted: 2024-05-16 | Resolved: 2025-02-24

## 2025-02-24 PROBLEM — E66.811 OBESITY (BMI 30.0-34.9): Status: RESOLVED | Noted: 2024-07-18 | Resolved: 2025-02-24

## 2025-02-24 PROBLEM — M79.604 BILATERAL LEG PAIN: Status: RESOLVED | Noted: 2024-05-16 | Resolved: 2025-02-24

## 2025-02-24 PROBLEM — E66.01 OBESITY, MORBID (HCC): Status: RESOLVED | Noted: 2022-05-26 | Resolved: 2025-02-24

## 2025-02-24 PROCEDURE — 96372 THER/PROPH/DIAG INJ SC/IM: CPT | Performed by: FAMILY MEDICINE

## 2025-02-24 PROCEDURE — 99214 OFFICE O/P EST MOD 30 MIN: CPT | Performed by: FAMILY MEDICINE

## 2025-02-24 RX ORDER — KETOROLAC TROMETHAMINE 30 MG/ML
30 INJECTION, SOLUTION INTRAMUSCULAR; INTRAVENOUS ONCE
Status: COMPLETED | OUTPATIENT
Start: 2025-02-24 | End: 2025-02-24

## 2025-02-24 RX ORDER — CELECOXIB 100 MG/1
100 CAPSULE ORAL 2 TIMES DAILY
Qty: 60 CAPSULE | Refills: 1 | Status: SHIPPED | OUTPATIENT
Start: 2025-02-24

## 2025-02-24 RX ORDER — PREDNISONE 10 MG/1
TABLET ORAL
Qty: 14 TABLET | Refills: 0 | Status: SHIPPED | OUTPATIENT
Start: 2025-02-24

## 2025-02-24 RX ORDER — DEXAMETHASONE SODIUM PHOSPHATE 4 MG/ML
4 INJECTION, SOLUTION INTRA-ARTICULAR; INTRALESIONAL; INTRAMUSCULAR; INTRAVENOUS; SOFT TISSUE ONCE
Status: COMPLETED | OUTPATIENT
Start: 2025-02-24 | End: 2025-02-24

## 2025-02-24 RX ADMIN — KETOROLAC TROMETHAMINE 30 MG: 30 INJECTION, SOLUTION INTRAMUSCULAR; INTRAVENOUS at 18:39

## 2025-02-24 RX ADMIN — DEXAMETHASONE SODIUM PHOSPHATE 4 MG: 4 INJECTION, SOLUTION INTRA-ARTICULAR; INTRALESIONAL; INTRAMUSCULAR; INTRAVENOUS; SOFT TISSUE at 18:39

## 2025-02-24 NOTE — PROGRESS NOTES
Name: Rachelle Fan      : 1945      MRN: [id number]  Encounter Provider: Owen Hicks MD  Encounter Date: 2025   Encounter department: [facility]    Assessment & Plan  Right hip pain    Orders:  •  predniSONE 10 mg tablet; TAKE 2 TABS PO QD X 4 DAYS THEN 1 TAB PO QD X 4 DAYS THEN 1/2 TAB PO QD X 4 DAYS  •  ketorolac (TORADOL) injection 30 mg  •  dexamethasone (DECADRON) injection 4 mg  •  celecoxib (CeleBREX) 100 mg capsule; Take 1 capsule (100 mg total) by mouth 2 (two) times a day    Lumbar radiculopathy    Orders:  •  predniSONE 10 mg tablet; TAKE 2 TABS PO QD X 4 DAYS THEN 1 TAB PO QD X 4 DAYS THEN 1/2 TAB PO QD X 4 DAYS  •  ketorolac (TORADOL) injection 30 mg  •  dexamethasone (DECADRON) injection 4 mg  •  celecoxib (CeleBREX) 100 mg capsule; Take 1 capsule (100 mg total) by mouth 2 (two) times a day    Primary hypertension         Mixed hyperlipidemia           Falls Plan of Care: balance, strength, and gait training instructions were provided.         History of Present Illness     Hip Pain   The incident occurred more than 1 week ago. The incident occurred at home. There was no injury mechanism. The pain is present in the right hip. The pain is mild. The pain has been Fluctuating since onset. Pertinent negatives include no inability to bear weight, loss of motion, loss of sensation, muscle weakness, numbness or tingling. The symptoms are aggravated by movement. She has tried acetaminophen for the symptoms. The treatment provided no relief.     Review of Systems   Constitutional:  Negative for chills, fatigue and fever.   HENT:  Negative for congestion, ear discharge, ear pain, mouth sores, postnasal drip, sore throat and trouble swallowing.    Eyes:  Negative for pain, discharge and visual disturbance.   Respiratory:  Negative for cough, shortness of breath and wheezing.    Cardiovascular:  Negative for chest pain, palpitations and leg swelling.    Gastrointestinal:  Negative for abdominal distention, abdominal pain, blood in stool, diarrhea and nausea.   Endocrine: Negative for polydipsia, polyphagia and polyuria.   Genitourinary:  Negative for dysuria, frequency, hematuria and urgency.   Musculoskeletal:  Positive for arthralgias and back pain. Negative for gait problem and joint swelling.   Skin:  Negative for pallor and rash.   Neurological:  Negative for dizziness, tingling, syncope, speech difficulty, weakness, light-headedness, numbness and headaches.   Hematological:  Negative for adenopathy.   Psychiatric/Behavioral:  Negative for behavioral problems, confusion and sleep disturbance. The patient is not nervous/anxious.      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)    • Overweight 04/28/2016   • Pre-operative clearance 06/26/2024     Past Surgical History:   Procedure Laterality Date   • BREAST LUMPECTOMY Right    • HYSTERECTOMY      partial - age 37   • SC ARTHROPLASTY GLENOHUMERAL JOINT TOTAL SHOULDER Right 03/02/2023    Procedure: Reverse Total Shoulder Arthroplasty - SAME DAY DISCHARGE;  Surgeon: Dennys Atkins MD;  Location: [facility];  Service: Orthopedics   • SHOULDER OPEN ROTATOR CUFF REPAIR Right 03/13/2023     Family History   Problem Relation Age of Onset   • Other Mother         DJD, cervical   • Diabetes Father         DM   • Autism Son    • Substance Abuse Neg Hx    • Mental illness Neg Hx      Social History     Tobacco Use   • Smoking status: Never     Passive exposure: Never   • Smokeless tobacco: Never   Vaping Use   • Vaping status: Never Used   Substance and Sexual Activity   • Alcohol use: Never   • Drug use: No   • Sexual activity: Not Currently     Current Outpatient Medications on File Prior to Visit   Medication Sig   • acetaminophen (TYLENOL) 325 mg tablet Take  "3 tablets (975 mg total) by mouth 2 (two) times a day   • amLODIPine (NORVASC) 5 mg tablet Take 5 mg by mouth in the morning.   • CALCIUM PO Take 600 mg by mouth in the morning   • cholecalciferol (VITAMIN D3) 1,000 units tablet Take 2 tablets (2,000 Units total) by mouth daily   • cyanocobalamin (VITAMIN B-12) 1000 MCG tablet Take 1 tablet (1,000 mcg total) by mouth daily Do not start before March 10, 2023.   • levothyroxine 88 mcg tablet Take 1 tablet (88 mcg total) by mouth daily   • losartan (COZAAR) 100 MG tablet Take 25 mg by mouth daily   • [DISCONTINUED] amoxicillin (AMOXIL) 500 mg capsule  (Patient not taking: Reported on 8/22/2024)   • [DISCONTINUED] celecoxib (CeleBREX) 200 mg capsule Take 200 mg by mouth daily     Allergies   Allergen Reactions   • Zoledronic Acid Edema and Swelling     Immunization History   Administered Date(s) Administered   • COVID-19 MODERNA VACC 0.5 ML IM 03/02/2021, 03/30/2021   • Influenza, high dose seasonal 0.7 mL 12/18/2018, 02/27/2020   • Pneumococcal Conjugate 13-Valent 12/18/2018     Objective   /80   Pulse 90   Temp (!) 97.1 °F (36.2 °C)   Resp 18   Ht 4' 9\" (1.448 m)   Wt 56 kg (123 lb 6.4 oz)   SpO2 97%   BMI 26.70 kg/m²     Physical Exam  Constitutional:       General: She is not in acute distress.     Appearance: Normal appearance. She is well-developed and normal weight. She is not ill-appearing.   HENT:      Head: Normocephalic and atraumatic.      Nose: Nose normal.      Mouth/Throat:      Mouth: Mucous membranes are moist.   Eyes:      General:         Right eye: No discharge.         Left eye: No discharge.      Conjunctiva/sclera: Conjunctivae normal.      Pupils: Pupils are equal, round, and reactive to light.   Neck:      Thyroid: No thyromegaly.   Cardiovascular:      Rate and Rhythm: Normal rate and regular rhythm.      Heart sounds: Normal heart sounds. No murmur heard.  Pulmonary:      Effort: Pulmonary effort is normal. No respiratory " distress.      Breath sounds: Normal breath sounds. No wheezing or rales.   Abdominal:      General: Bowel sounds are normal.      Palpations: Abdomen is soft.      Tenderness: There is no abdominal tenderness.   Musculoskeletal:         General: Tenderness present.      Cervical back: Neck supple.      Comments: R SCIATIC NOTCH TENDERNESS    FROM AT HIP WITH MINIMAL DISCOMFORT  NEG SLR  NO ALTERATION OF SENSATION   Lymphadenopathy:      Cervical: No cervical adenopathy.   Skin:     General: Skin is warm and dry.      Findings: No erythema or rash.   Neurological:      General: No focal deficit present.      Mental Status: She is alert and oriented to person, place, and time.   Psychiatric:         Behavior: Behavior normal.         Thought Content: Thought content normal.         Judgment: Judgment normal.

## 2025-02-25 NOTE — Clinical Note
Can you obtain her labs from [facility] on 8/19/24, she had an overnight stay in the hospital. She had a CT scan too, please obtain.  denies pain/discomfort (Rating = 0)

## 2025-03-28 ENCOUNTER — TELEPHONE (OUTPATIENT)
Dept: ADMINISTRATIVE | Facility: OTHER | Age: 80
End: 2025-03-28

## 2025-03-28 NOTE — TELEPHONE ENCOUNTER
03/28/25 2:07 PM    Patient contacted to bring Advance Directive, POLST, or Living Will document to next scheduled pcp visit.[facility] Department was unable to leave a message; no answer/ line busy.    Thank you.  Africa Leon MA  [facility]

## 2025-03-31 ENCOUNTER — OFFICE VISIT (OUTPATIENT)
Dept: FAMILY MEDICINE CLINIC | Facility: CLINIC | Age: 80
End: 2025-03-31
Payer: MEDICARE

## 2025-03-31 VITALS
SYSTOLIC BLOOD PRESSURE: 116 MMHG | BODY MASS INDEX: 25.89 KG/M2 | RESPIRATION RATE: 16 BRPM | DIASTOLIC BLOOD PRESSURE: 68 MMHG | WEIGHT: 120 LBS | HEART RATE: 84 BPM | HEIGHT: 57 IN | OXYGEN SATURATION: 98 % | TEMPERATURE: 98.7 F

## 2025-03-31 DIAGNOSIS — R26.9 GAIT DIFFICULTY: ICD-10-CM

## 2025-03-31 DIAGNOSIS — M17.0 OSTEOARTHRITIS OF BOTH KNEES, UNSPECIFIED OSTEOARTHRITIS TYPE: ICD-10-CM

## 2025-03-31 DIAGNOSIS — I10 PRIMARY HYPERTENSION: ICD-10-CM

## 2025-03-31 DIAGNOSIS — M25.551 RIGHT HIP PAIN: Primary | ICD-10-CM

## 2025-03-31 PROCEDURE — G2211 COMPLEX E/M VISIT ADD ON: HCPCS | Performed by: FAMILY MEDICINE

## 2025-03-31 PROCEDURE — 99214 OFFICE O/P EST MOD 30 MIN: CPT | Performed by: FAMILY MEDICINE

## 2025-03-31 RX ORDER — TRAZODONE HYDROCHLORIDE 50 MG/1
TABLET ORAL
COMMUNITY
Start: 2025-03-27

## 2025-03-31 RX ORDER — ARIPIPRAZOLE 2 MG/1
1 TABLET ORAL 2 TIMES DAILY
COMMUNITY
Start: 2025-03-13

## 2025-03-31 NOTE — PROGRESS NOTES
Name: Rachelle Fan      : 1945      MRN: [id number]  Encounter Provider: Owen Hicks MD  Encounter Date: 3/31/2025   Encounter department: [facility]    Assessment & Plan  Right hip pain         Primary hypertension  STABLE  DENIES ANY CP, SOB, PALPITATIONS, OR HEADACHE  NOTES NO WATER RETENTION  COMPLIANT WITH MEDICATION  NO CONCERNS    - CONTINUE CURRENT TREATMENT PLAN  - MONITOR DIETARY SODIUM INTAKE  - ENCOURAGE PHYSICAL ACTIVITY  - RV 3 MONTHS         Osteoarthritis of both knees, unspecified osteoarthritis type  STABLE  DENIES ANY JOINT SWELLING OR REDNESS  JOINT STIFFNESS PRESENT  PAIN MANAGEMENT ADEQUATE    - CONTINUE CURRENT MANAGEMENT  - MEDICATION AS DIRECTED  - CALL / RETURN IF SYMPTOMS WORSEN         Gait difficulty           Depression Screening and Follow-up Plan: Patient was screened for depression during today's encounter. They screened negative with a PHQ-2 score of 0.          History of Present Illness     PATIENT RETURNS FOR ROUTINE EVALUATION OF PATIENT'S MEDICAL ISSUES    INDIVIDUAL MEDICAL ISSUES WITH THEIR CURRENT STATUS, ASSESSMENT AND PLANS ARE LISTED ABOVE            Review of Systems   Constitutional:  Negative for chills, fatigue and fever.   HENT:  Negative for congestion, ear discharge, ear pain, mouth sores, postnasal drip, sore throat and trouble swallowing.    Eyes:  Negative for pain, discharge and visual disturbance.   Respiratory:  Negative for cough, shortness of breath and wheezing.    Cardiovascular:  Negative for chest pain, palpitations and leg swelling.   Gastrointestinal:  Negative for abdominal distention, abdominal pain, blood in stool, diarrhea and nausea.   Endocrine: Negative for polydipsia, polyphagia and polyuria.   Genitourinary:  Negative for dysuria, frequency, hematuria and urgency.   Musculoskeletal:  Positive for arthralgias. Negative for gait problem and joint swelling.   Skin:  Negative for pallor and rash.    Neurological:  Negative for dizziness, syncope, speech difficulty, weakness, light-headedness, numbness and headaches.   Hematological:  Negative for adenopathy.   Psychiatric/Behavioral:  Negative for behavioral problems, confusion and sleep disturbance. The patient is not nervous/anxious.      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)    • Overweight 04/28/2016   • Pre-operative clearance 06/26/2024     Past Surgical History:   Procedure Laterality Date   • BREAST LUMPECTOMY Right    • HYSTERECTOMY      partial - age 37   • MO ARTHROPLASTY GLENOHUMERAL JOINT TOTAL SHOULDER Right 03/02/2023    Procedure: Reverse Total Shoulder Arthroplasty - SAME DAY DISCHARGE;  Surgeon: Dennys Atkins MD;  Location: [facility];  Service: Orthopedics   • SHOULDER OPEN ROTATOR CUFF REPAIR Right 03/13/2023     Family History   Problem Relation Age of Onset   • Other Mother         DJD, cervical   • Diabetes Father         DM   • Autism Son    • Substance Abuse Neg Hx    • Mental illness Neg Hx      Social History     Tobacco Use   • Smoking status: Never     Passive exposure: Never   • Smokeless tobacco: Never   Vaping Use   • Vaping status: Never Used   Substance and Sexual Activity   • Alcohol use: Never   • Drug use: No   • Sexual activity: Not Currently     Current Outpatient Medications on File Prior to Visit   Medication Sig   • acetaminophen (TYLENOL) 325 mg tablet Take 3 tablets (975 mg total) by mouth 2 (two) times a day   • amLODIPine (NORVASC) 5 mg tablet Take 5 mg by mouth in the morning.   • ARIPiprazole (ABILIFY) 2 mg tablet Take 1 tablet by mouth 2 (two) times a day   • CALCIUM PO Take 600 mg by mouth in the morning   • celecoxib (CeleBREX) 100 mg capsule Take 1 capsule (100 mg total) by mouth 2 (two) times a day   • cholecalciferol (VITAMIN D3) 1,000  "units tablet Take 2 tablets (2,000 Units total) by mouth daily   • cyanocobalamin (VITAMIN B-12) 1000 MCG tablet Take 1 tablet (1,000 mcg total) by mouth daily Do not start before March 10, 2023.   • levothyroxine 88 mcg tablet Take 1 tablet (88 mcg total) by mouth daily   • losartan (COZAAR) 100 MG tablet Take 25 mg by mouth daily   • predniSONE 10 mg tablet TAKE 2 TABS PO QD X 4 DAYS THEN 1 TAB PO QD X 4 DAYS THEN 1/2 TAB PO QD X 4 DAYS   • traZODone (DESYREL) 50 mg tablet TAKE 1/2 (ONE-HALF) TABLET BY MOUTH ONCE DAILY AT NIGHT AT BEDTIME     Allergies   Allergen Reactions   • Zoledronic Acid Edema and Swelling     Immunization History   Administered Date(s) Administered   • COVID-19 MODERNA VACC 0.5 ML IM 03/02/2021, 03/30/2021   • Influenza, high dose seasonal 0.7 mL 12/18/2018, 02/27/2020   • Pneumococcal Conjugate 13-Valent 12/18/2018     Objective   /68 (BP Location: Left arm, Patient Position: Sitting, Cuff Size: Standard)   Pulse 84   Temp 98.7 °F (37.1 °C) (Temporal)   Resp 16   Ht 4' 9\" (1.448 m)   Wt 54.4 kg (120 lb)   SpO2 98%   BMI 25.97 kg/m²     Physical Exam  Constitutional:       Appearance: She is well-developed.   HENT:      Head: Normocephalic and atraumatic.      Nose: Nose normal.      Mouth/Throat:      Mouth: Mucous membranes are moist.   Eyes:      General:         Right eye: No discharge.         Left eye: No discharge.      Conjunctiva/sclera: Conjunctivae normal.      Pupils: Pupils are equal, round, and reactive to light.   Neck:      Thyroid: No thyromegaly.   Cardiovascular:      Rate and Rhythm: Normal rate and regular rhythm.      Heart sounds: Normal heart sounds. No murmur heard.  Pulmonary:      Effort: Pulmonary effort is normal. No respiratory distress.      Breath sounds: Normal breath sounds. No wheezing or rales.   Abdominal:      General: Bowel sounds are normal.      Palpations: Abdomen is soft.      Tenderness: There is no abdominal tenderness. "   Musculoskeletal:         General: No tenderness.      Cervical back: Normal range of motion and neck supple.      Comments: MODERATE DJD CHANGES     Lymphadenopathy:      Cervical: No cervical adenopathy.   Skin:     General: Skin is warm and dry.      Findings: No erythema or rash.   Neurological:      Mental Status: She is alert and oriented to person, place, and time.   Psychiatric:         Behavior: Behavior normal.         Thought Content: Thought content normal.         Judgment: Judgment normal.

## 2025-04-20 DIAGNOSIS — M25.551 RIGHT HIP PAIN: ICD-10-CM

## 2025-04-20 DIAGNOSIS — M54.16 LUMBAR RADICULOPATHY: ICD-10-CM

## 2025-04-20 RX ORDER — CELECOXIB 100 MG/1
100 CAPSULE ORAL 2 TIMES DAILY
Qty: 60 CAPSULE | Refills: 5 | Status: SHIPPED | OUTPATIENT
Start: 2025-04-20

## 2025-06-02 PROBLEM — M15.0 PRIMARY OSTEOARTHRITIS INVOLVING MULTIPLE JOINTS: Status: ACTIVE | Noted: 2025-06-02

## 2025-06-02 NOTE — ASSESSMENT & PLAN NOTE
WATCHING CHOLESTEROL INTAKE  COMPLIANT WITH MEDICATION  NO CONCERNS    - CONTINUE TO MONITOR DIETARY CHOL INTAKE  - CONTINUE CURRENT MEDICATION  - ENCOURAGED PHYSICAL ACTIVITY    Orders:  •  Comprehensive metabolic panel  •  Lipid panel

## 2025-06-02 NOTE — ASSESSMENT & PLAN NOTE
STABLE  DENIES ANY CP, SOB, PALPITATIONS, OR HEADACHE  NOTES NO WATER RETENTION  COMPLIANT WITH MEDICATION  NO CONCERNS    - CONTINUE CURRENT TREATMENT PLAN  - MONITOR DIETARY SODIUM INTAKE  - ENCOURAGE PHYSICAL ACTIVITY  - RV 3 MONTHS    Orders:  •  Comprehensive metabolic panel  •  POCT ECG

## 2025-06-03 ENCOUNTER — OFFICE VISIT (OUTPATIENT)
Dept: FAMILY MEDICINE CLINIC | Facility: CLINIC | Age: 80
End: 2025-06-03
Payer: MEDICARE

## 2025-06-03 VITALS
HEART RATE: 82 BPM | BODY MASS INDEX: 24.72 KG/M2 | SYSTOLIC BLOOD PRESSURE: 110 MMHG | OXYGEN SATURATION: 98 % | TEMPERATURE: 97.7 F | HEIGHT: 57 IN | DIASTOLIC BLOOD PRESSURE: 58 MMHG | WEIGHT: 114.6 LBS | RESPIRATION RATE: 16 BRPM

## 2025-06-03 DIAGNOSIS — R00.2 PALPITATIONS: ICD-10-CM

## 2025-06-03 DIAGNOSIS — M15.0 PRIMARY OSTEOARTHRITIS INVOLVING MULTIPLE JOINTS: ICD-10-CM

## 2025-06-03 DIAGNOSIS — R44.0 AUDITORY HALLUCINATIONS: ICD-10-CM

## 2025-06-03 DIAGNOSIS — E78.2 MIXED HYPERLIPIDEMIA: ICD-10-CM

## 2025-06-03 DIAGNOSIS — R53.83 OTHER FATIGUE: ICD-10-CM

## 2025-06-03 DIAGNOSIS — I10 PRIMARY HYPERTENSION: Primary | ICD-10-CM

## 2025-06-03 DIAGNOSIS — E03.9 ACQUIRED HYPOTHYROIDISM: ICD-10-CM

## 2025-06-03 PROCEDURE — 99214 OFFICE O/P EST MOD 30 MIN: CPT | Performed by: FAMILY MEDICINE

## 2025-06-03 PROCEDURE — 93000 ELECTROCARDIOGRAM COMPLETE: CPT | Performed by: FAMILY MEDICINE

## 2025-06-03 PROCEDURE — G2211 COMPLEX E/M VISIT ADD ON: HCPCS | Performed by: FAMILY MEDICINE

## 2025-06-03 RX ORDER — PALIPERIDONE 3 MG/1
1 TABLET, EXTENDED RELEASE ORAL DAILY
COMMUNITY
Start: 2025-05-23

## 2025-06-03 NOTE — PROGRESS NOTES
Name: Rachelle Fan      : 1945      MRN: [id number]  Encounter Provider: Owen Hicks MD  Encounter Date: 6/3/2025   Encounter department: [facility]    Assessment & Plan  Primary hypertension  STABLE  DENIES ANY CP, SOB, PALPITATIONS, OR HEADACHE  NOTES NO WATER RETENTION  COMPLIANT WITH MEDICATION  NO CONCERNS    - CONTINUE CURRENT TREATMENT PLAN  - MONITOR DIETARY SODIUM INTAKE  - ENCOURAGE PHYSICAL ACTIVITY  - RV 3 MONTHS    Orders:  •  Comprehensive metabolic panel  •  POCT ECG    Mixed hyperlipidemia  WATCHING CHOLESTEROL INTAKE  COMPLIANT WITH MEDICATION  NO CONCERNS    - CONTINUE TO MONITOR DIETARY CHOL INTAKE  - CONTINUE CURRENT MEDICATION  - ENCOURAGED PHYSICAL ACTIVITY    Orders:  •  Comprehensive metabolic panel  •  Lipid panel    Acquired hypothyroidism  STABLE    Orders:  •  TSH, 3rd generation  •  T4, free    Primary osteoarthritis involving multiple joints  STABLE  DENIES ANY JOINT SWELLING OR REDNESS  JOINT STIFFNESS PRESENT  PAIN MANAGEMENT ADEQUATE    - CONTINUE CURRENT MANAGEMENT  - MEDICATION AS DIRECTED  - CALL / RETURN IF SYMPTOMS WORSEN         Palpitations  OCC PALPITATIONS  ? 1 SYNCOPAL EPISODE  DENIES ANY CP, SOB,NVD  NO EDEMA    Orders:  •  C-reactive protein  •  Sedimentation rate, automated  •  CBC and differential    Other fatigue    Orders:  •  C-reactive protein  •  Sedimentation rate, automated  •  CBC and differential    Auditory hallucinations    Orders:  •  C-reactive protein  •  Sedimentation rate, automated  •  CBC and differential         History of Present Illness     PATIENT RETURNS FOR ROUTINE EVALUATION OF PATIENT'S MEDICAL ISSUES    INDIVIDUAL MEDICAL ISSUES WITH THEIR CURRENT STATUS, ASSESSMENT AND PLANS ARE LISTED ABOVE            Review of Systems   Constitutional:  Positive for fatigue. Negative for chills and fever.   HENT:  Negative for congestion, ear discharge, ear pain, mouth sores, postnasal drip, sore throat and  "trouble swallowing.    Eyes:  Negative for pain, discharge and visual disturbance.   Respiratory:  Negative for cough, shortness of breath and wheezing.    Cardiovascular:  Positive for palpitations. Negative for chest pain and leg swelling.   Gastrointestinal:  Negative for abdominal distention, abdominal pain, blood in stool, diarrhea and nausea.   Endocrine: Negative for polydipsia, polyphagia and polyuria.   Genitourinary:  Negative for dysuria, frequency, hematuria and urgency.   Musculoskeletal:  Positive for arthralgias. Negative for gait problem and joint swelling.   Skin:  Negative for pallor and rash.   Neurological:  Negative for dizziness, syncope, speech difficulty, weakness, light-headedness, numbness and headaches.   Hematological:  Negative for adenopathy.   Psychiatric/Behavioral:  Positive for hallucinations. Negative for behavioral problems, confusion and sleep disturbance. The patient is not nervous/anxious.      Past Medical History[1]  Past Surgical History[2]  Family History[3]  Social History[4]  Medications[5]  Allergies   Allergen Reactions   • Zoledronic Acid Edema and Swelling     Immunization History   Administered Date(s) Administered   • COVID-19 MODERNA VACC 0.5 ML IM 03/02/2021, 03/30/2021   • Influenza, high dose seasonal 0.7 mL 12/18/2018, 02/27/2020   • Pneumococcal Conjugate 13-Valent 12/18/2018     Objective   /58   Pulse 82   Temp 97.7 °F (36.5 °C)   Resp 16   Ht 4' 9\" (1.448 m)   Wt 52 kg (114 lb 9.6 oz)   SpO2 98%   BMI 24.80 kg/m²     Physical Exam  Constitutional:       Appearance: She is well-developed.   HENT:      Head: Normocephalic and atraumatic.      Nose: Nose normal.     Eyes:      General:         Right eye: No discharge.         Left eye: No discharge.      Conjunctiva/sclera: Conjunctivae normal.      Pupils: Pupils are equal, round, and reactive to light.     Neck:      Thyroid: No thyromegaly.     Cardiovascular:      Rate and Rhythm: Normal rate " and regular rhythm.      Heart sounds: Murmur heard.   Pulmonary:      Effort: Pulmonary effort is normal. No respiratory distress.      Breath sounds: No wheezing or rales.      Comments: BIBASILAR CRACKLES  Abdominal:      General: Bowel sounds are normal.      Palpations: Abdomen is soft.      Tenderness: There is no abdominal tenderness.     Musculoskeletal:         General: No tenderness.      Cervical back: Normal range of motion and neck supple.      Comments: MILD DJD CHANGES     Lymphadenopathy:      Cervical: No cervical adenopathy.     Skin:     General: Skin is warm and dry.      Findings: No erythema or rash.     Neurological:      General: No focal deficit present.      Mental Status: She is alert and oriented to person, place, and time.     Psychiatric:         Mood and Affect: Mood normal.         Behavior: Behavior normal.         Thought Content: Thought content normal.         Judgment: Judgment normal.                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)    • Overweight 04/28/2016   • Pre-operative clearance 06/26/2024   [2]  Past Surgical History:  Procedure Laterality Date   • BREAST LUMPECTOMY Right    • HYSTERECTOMY      partial - age 37   • [place] ARTHROPLASTY GLENOHUMERAL JOINT TOTAL SHOULDER Right 03/02/2023    Procedure: Reverse Total Shoulder Arthroplasty - SAME DAY DISCHARGE;  Surgeon: Dennys Atkins MD;  Location: [facility];  Service: Orthopedics   • SHOULDER OPEN ROTATOR CUFF REPAIR Right 03/13/2023   [3]  Family History  Problem Relation Name Age of Onset   • Other Mother          DJD, cervical   • Diabetes Father Hai Cesar         DM   • Autism Son     • Substance Abuse Neg Hx     • Mental illness Neg Hx     [4]  Social History  Tobacco Use   • Smoking status: Never     Passive exposure: Never   • Smokeless  tobacco: Never   Vaping Use   • Vaping status: Never Used   Substance and Sexual Activity   • Alcohol use: Never   • Drug use: No   • Sexual activity: Not Currently   [5]  Current Outpatient Medications on File Prior to Visit   Medication Sig   • acetaminophen (TYLENOL) 325 mg tablet Take 3 tablets (975 mg total) by mouth 2 (two) times a day   • amLODIPine (NORVASC) 5 mg tablet Take 5 mg by mouth in the morning.   • ARIPiprazole (ABILIFY) 2 mg tablet Take 1 tablet by mouth in the morning and 1 tablet before bedtime.   • CALCIUM PO Take 600 mg by mouth in the morning   • celecoxib (CeleBREX) 100 mg capsule Take 1 capsule by mouth twice daily   • cholecalciferol (VITAMIN D3) 1,000 units tablet Take 2 tablets (2,000 Units total) by mouth daily   • cyanocobalamin (VITAMIN B-12) 1000 MCG tablet Take 1 tablet (1,000 mcg total) by mouth daily Do not start before March 10, 2023.   • levothyroxine 88 mcg tablet Take 1 tablet (88 mcg total) by mouth daily   • losartan (COZAAR) 100 MG tablet Take 25 mg by mouth in the morning.   • paliperidone (INVEGA) 3 mg 24 hr tablet Take 1 tablet by mouth in the morning   • [DISCONTINUED] predniSONE 10 mg tablet TAKE 2 TABS PO QD X 4 DAYS THEN 1 TAB PO QD X 4 DAYS THEN 1/2 TAB PO QD X 4 DAYS (Patient not taking: Reported on 6/3/2025)   • [DISCONTINUED] traZODone (DESYREL) 50 mg tablet  (Patient not taking: Reported on 6/3/2025)

## 2025-06-04 LAB
ALBUMIN SERPL-MCNC: 4.7 G/DL (ref 3.6–5.1)
ALBUMIN/GLOB SERPL: 1.7 (CALC) (ref 1–2.5)
ALP SERPL-CCNC: 78 U/L (ref 37–153)
ALT SERPL-CCNC: 10 U/L (ref 6–29)
AST SERPL-CCNC: 18 U/L (ref 10–35)
BASOPHILS # BLD AUTO: 30 CELLS/UL (ref 0–200)
BASOPHILS NFR BLD AUTO: 0.8 %
BILIRUB SERPL-MCNC: 1.2 MG/DL (ref 0.2–1.2)
BUN SERPL-MCNC: 16 MG/DL (ref 7–25)
BUN/CREAT SERPL: 33 (CALC) (ref 6–22)
CALCIUM SERPL-MCNC: 10.1 MG/DL (ref 8.6–10.4)
CHLORIDE SERPL-SCNC: 106 MMOL/L (ref 98–110)
CHOLEST SERPL-MCNC: 197 MG/DL
CHOLEST/HDLC SERPL: 2.5 (CALC)
CO2 SERPL-SCNC: 21 MMOL/L (ref 20–32)
CREAT SERPL-MCNC: 0.48 MG/DL (ref 0.6–0.95)
CRP SERPL-MCNC: <3 MG/L
EOSINOPHIL # BLD AUTO: 59 CELLS/UL (ref 15–500)
EOSINOPHIL NFR BLD AUTO: 1.6 %
ERYTHROCYTE [DISTWIDTH] IN BLOOD BY AUTOMATED COUNT: 13 % (ref 11–15)
ERYTHROCYTE [SEDIMENTATION RATE] IN BLOOD BY WESTERGREN METHOD: 14 MM/H
GFR/BSA.PRED SERPLBLD CYS-BASED-ARV: 95 ML/MIN/1.73M2
GLOBULIN SER CALC-MCNC: 2.8 G/DL (CALC) (ref 1.9–3.7)
GLUCOSE SERPL-MCNC: 96 MG/DL (ref 65–99)
HCT VFR BLD AUTO: 41.7 % (ref 35–45)
HDLC SERPL-MCNC: 78 MG/DL
HGB BLD-MCNC: 13.6 G/DL (ref 11.7–15.5)
LDLC SERPL CALC-MCNC: 105 MG/DL (CALC)
LYMPHOCYTES # BLD AUTO: 936 CELLS/UL (ref 850–3900)
LYMPHOCYTES NFR BLD AUTO: 25.3 %
MCH RBC QN AUTO: 30.6 PG (ref 27–33)
MCHC RBC AUTO-ENTMCNC: 32.6 G/DL (ref 32–36)
MCV RBC AUTO: 93.9 FL (ref 80–100)
MONOCYTES # BLD AUTO: 352 CELLS/UL (ref 200–950)
MONOCYTES NFR BLD AUTO: 9.5 %
NEUTROPHILS # BLD AUTO: 2324 CELLS/UL (ref 1500–7800)
NEUTROPHILS NFR BLD AUTO: 62.8 %
NONHDLC SERPL-MCNC: 119 MG/DL (CALC)
PLATELET # BLD AUTO: 185 THOUSAND/UL (ref 140–400)
PMV BLD REES-ECKER: 10.4 FL (ref 7.5–12.5)
POTASSIUM SERPL-SCNC: 3.7 MMOL/L (ref 3.5–5.3)
PROT SERPL-MCNC: 7.5 G/DL (ref 6.1–8.1)
RBC # BLD AUTO: 4.44 MILLION/UL (ref 3.8–5.1)
SODIUM SERPL-SCNC: 143 MMOL/L (ref 135–146)
T4 FREE SERPL-MCNC: 2 NG/DL (ref 0.8–1.8)
TRIGL SERPL-MCNC: 53 MG/DL
TSH SERPL-ACNC: 0.7 MIU/L (ref 0.4–4.5)
WBC # BLD AUTO: 3.7 THOUSAND/UL (ref 3.8–10.8)

## 2025-06-16 ENCOUNTER — TELEPHONE (OUTPATIENT)
Dept: ADMINISTRATIVE | Facility: OTHER | Age: 80
End: 2025-06-16

## 2025-06-16 NOTE — TELEPHONE ENCOUNTER
06/16/25 4:11 PM    Patient contacted to bring Advance Directive, POLST, or Living Will document to next scheduled pcp visit.[facility] Department was unable to leave a message; number on file disconnected.    Thank you.  Africa Leon MA  [facility]

## 2025-06-17 ENCOUNTER — OFFICE VISIT (OUTPATIENT)
Dept: FAMILY MEDICINE CLINIC | Facility: CLINIC | Age: 80
End: 2025-06-17
Payer: MEDICARE

## 2025-06-17 VITALS
SYSTOLIC BLOOD PRESSURE: 128 MMHG | RESPIRATION RATE: 16 BRPM | HEART RATE: 56 BPM | TEMPERATURE: 97.2 F | DIASTOLIC BLOOD PRESSURE: 80 MMHG | OXYGEN SATURATION: 99 % | HEIGHT: 57 IN | BODY MASS INDEX: 25.46 KG/M2 | WEIGHT: 118 LBS

## 2025-06-17 DIAGNOSIS — E03.9 ACQUIRED HYPOTHYROIDISM: ICD-10-CM

## 2025-06-17 DIAGNOSIS — M15.0 PRIMARY OSTEOARTHRITIS INVOLVING MULTIPLE JOINTS: ICD-10-CM

## 2025-06-17 DIAGNOSIS — I10 PRIMARY HYPERTENSION: Primary | ICD-10-CM

## 2025-06-17 DIAGNOSIS — R05.2 SUBACUTE COUGH: ICD-10-CM

## 2025-06-17 PROCEDURE — 99214 OFFICE O/P EST MOD 30 MIN: CPT | Performed by: FAMILY MEDICINE

## 2025-06-17 PROCEDURE — G2211 COMPLEX E/M VISIT ADD ON: HCPCS | Performed by: FAMILY MEDICINE

## 2025-06-17 NOTE — PATIENT INSTRUCTIONS
CONTINUE CURRENT TREATMENT PLAN  MONITOR DIETARY SODIUM, CHOL INTAKE  ENCOURAGE PHYSICAL ACTIVITY    OTC COUGH MEDICATION  REST  IF WORSE - CALL    RV 3 M, SOONER PRN

## 2025-06-17 NOTE — PROGRESS NOTES
Name: Rachelle Fan      : 1945      MRN: [id number]  Encounter Provider: Owen Hicks MD  Encounter Date: 2025   Encounter department: [facility]    Assessment & Plan  Primary hypertension  STABLE  DENIES ANY CP, SOB, PALPITATIONS, OR HEADACHE  NOTES NO WATER RETENTION  COMPLIANT WITH MEDICATION  NO CONCERNS    - CONTINUE CURRENT TREATMENT PLAN  - MONITOR DIETARY SODIUM INTAKE  - ENCOURAGE PHYSICAL ACTIVITY  - RV 3 MONTHS         Acquired hypothyroidism  STABLE  TSH NORMAL       Primary osteoarthritis involving multiple joints  STABLE  DENIES ANY JOINT SWELLING OR REDNESS  JOINT STIFFNESS PRESENT  PAIN MANAGEMENT ADEQUATE    - CONTINUE CURRENT MANAGEMENT  - MEDICATION AS DIRECTED  - CALL / RETURN IF SYMPTOMS WORSEN         Subacute cough              History of Present Illness     FOLLOW UP      Review of Systems   Constitutional:  Negative for chills, fatigue and fever.   HENT:  Negative for congestion, ear discharge, ear pain, mouth sores, postnasal drip, sore throat and trouble swallowing.    Eyes:  Negative for pain, discharge and visual disturbance.   Respiratory:  Positive for cough. Negative for shortness of breath and wheezing.    Cardiovascular:  Negative for chest pain, palpitations and leg swelling.   Gastrointestinal:  Negative for abdominal distention, abdominal pain, blood in stool, diarrhea and nausea.   Endocrine: Negative for polydipsia, polyphagia and polyuria.   Genitourinary:  Negative for dysuria, frequency, hematuria and urgency.   Musculoskeletal:  Positive for arthralgias. Negative for gait problem and joint swelling.   Skin:  Negative for pallor and rash.   Neurological:  Negative for dizziness, syncope, speech difficulty, weakness, light-headedness, numbness and headaches.   Hematological:  Negative for adenopathy.   Psychiatric/Behavioral:  Negative for behavioral problems, confusion and sleep disturbance. The patient is nervous/anxious.   "    Past Medical History[1]  Past Surgical History[2]  Family History[3]  Social History[4]  Medications[5]  Allergies   Allergen Reactions   • Zoledronic Acid Edema and Swelling     Immunization History   Administered Date(s) Administered   • COVID-19 MODERNA VACC 0.5 ML IM 03/02/2021, 03/30/2021   • Influenza, high dose seasonal 0.7 mL 12/18/2018, 02/27/2020   • Pneumococcal Conjugate 13-Valent 12/18/2018     Objective   /80   Pulse 56   Temp (!) 97.2 °F (36.2 °C)   Resp 16   Ht 4' 9\" (1.448 m)   Wt 53.5 kg (118 lb)   SpO2 99%   BMI 25.53 kg/m²     Physical Exam  Constitutional:       General: She is not in acute distress.     Appearance: Normal appearance. She is well-developed and normal weight. She is not ill-appearing.   HENT:      Head: Normocephalic and atraumatic.      Nose: Nose normal.      Mouth/Throat:      Mouth: Mucous membranes are moist.     Eyes:      General:         Right eye: No discharge.         Left eye: No discharge.      Conjunctiva/sclera: Conjunctivae normal.      Pupils: Pupils are equal, round, and reactive to light.     Neck:      Thyroid: No thyromegaly.     Cardiovascular:      Rate and Rhythm: Normal rate and regular rhythm.      Heart sounds: Normal heart sounds. No murmur heard.  Pulmonary:      Effort: Pulmonary effort is normal. No respiratory distress.      Breath sounds: No wheezing or rales.      Comments: COARSE BS  Abdominal:      General: Bowel sounds are normal.      Palpations: Abdomen is soft.      Tenderness: There is no abdominal tenderness.     Musculoskeletal:         General: No tenderness.      Cervical back: Normal range of motion and neck supple.      Comments: MILD DJD CHANGES     Lymphadenopathy:      Cervical: No cervical adenopathy.     Skin:     General: Skin is warm and dry.      Findings: No erythema or rash.     Neurological:      General: No focal deficit present.      Mental Status: She is alert and oriented to person, place, and time. "     Psychiatric:         Mood and Affect: Mood normal.         Behavior: Behavior normal.         Thought Content: Thought content normal.         Judgment: Judgment normal.                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)    • Overweight 04/28/2016   • Pre-operative clearance 06/26/2024   [2]  Past Surgical History:  Procedure Laterality Date   • BREAST LUMPECTOMY Right    • HYSTERECTOMY      partial - age 37   • [place] ARTHROPLASTY GLENOHUMERAL JOINT TOTAL SHOULDER Right 03/02/2023    Procedure: Reverse Total Shoulder Arthroplasty - SAME DAY DISCHARGE;  Surgeon: Dennys Atkins MD;  Location: [facility];  Service: Orthopedics   • SHOULDER OPEN ROTATOR CUFF REPAIR Right 03/13/2023   [3]  Family History  Problem Relation Name Age of Onset   • Other Mother          DJD, cervical   • Diabetes Father Hai Cesar         DM   • Autism Son     • Substance Abuse Neg Hx     • Mental illness Neg Hx     [4]  Social History  Tobacco Use   • Smoking status: Never     Passive exposure: Never   • Smokeless tobacco: Never   Vaping Use   • Vaping status: Never Used   Substance and Sexual Activity   • Alcohol use: Never   • Drug use: No   • Sexual activity: Not Currently   [5]  Current Outpatient Medications on File Prior to Visit   Medication Sig   • acetaminophen (TYLENOL) 325 mg tablet Take 3 tablets (975 mg total) by mouth 2 (two) times a day   • amLODIPine (NORVASC) 5 mg tablet Take 5 mg by mouth in the morning.   • ARIPiprazole (ABILIFY) 2 mg tablet Take 1 tablet by mouth in the morning and 1 tablet before bedtime.   • CALCIUM PO Take 600 mg by mouth in the morning   • celecoxib (CeleBREX) 100 mg capsule Take 1 capsule by mouth twice daily   • cholecalciferol (VITAMIN D3) 1,000 units tablet Take 2 tablets (2,000 Units total) by mouth daily   •  cyanocobalamin (VITAMIN B-12) 1000 MCG tablet Take 1 tablet (1,000 mcg total) by mouth daily Do not start before March 10, 2023.   • levothyroxine 88 mcg tablet Take 1 tablet (88 mcg total) by mouth daily   • losartan (COZAAR) 100 MG tablet Take 25 mg by mouth in the morning.   • paliperidone (INVEGA) 3 mg 24 hr tablet Take 1 tablet by mouth in the morning

## 2025-07-14 DIAGNOSIS — E03.9 HYPOTHYROIDISM, UNSPECIFIED TYPE: ICD-10-CM

## 2025-07-16 RX ORDER — LEVOTHYROXINE SODIUM 88 UG/1
88 TABLET ORAL DAILY
Qty: 90 TABLET | Refills: 3 | Status: SHIPPED | OUTPATIENT
Start: 2025-07-16

## (undated) DEVICE — POSITIONER TRIMANO LIMB BEACH CHAIR

## (undated) DEVICE — ASTOUND STANDARD SURGICAL GOWN, XL: Brand: CONVERTORS

## (undated) DEVICE — DRAPE UTILITY

## (undated) DEVICE — BASIC DOUBLE BASIN 2-LF: Brand: MEDLINE INDUSTRIES, INC.

## (undated) DEVICE — HANDPIECE SET WITH HIGH FLOW TIP AND SUCTION TUBE: Brand: INTERPULSE

## (undated) DEVICE — ANTIBACTERIAL UNDYED BRAIDED (POLYGLACTIN 910), SYNTHETIC ABSORBABLE SUTURE: Brand: COATED VICRYL

## (undated) DEVICE — ADHESIVE SKIN HIGH VISCOSITY EXOFIN 1ML

## (undated) DEVICE — HOOD: Brand: FLYTE, SURGICOOL

## (undated) DEVICE — GLOVE INDICATOR PI UNDERGLOVE SZ 7.5 BLUE

## (undated) DEVICE — CAPIT SHOULDER GLOBAL UNITE REVERSE -W/STEM

## (undated) DEVICE — TIBURON SPLIT SHEET: Brand: CONVERTORS

## (undated) DEVICE — DUAL CUT SAGITTAL BLADE

## (undated) DEVICE — VEST SURGEON DISPOSABLE

## (undated) DEVICE — DRAPE SHEET X-LG

## (undated) DEVICE — CHLORAPREP HI-LITE 26ML ORANGE

## (undated) DEVICE — INTENDED FOR TISSUE SEPARATION, AND OTHER PROCEDURES THAT REQUIRE A SHARP SURGICAL BLADE TO PUNCTURE OR CUT.: Brand: BARD-PARKER SAFETY BLADES SIZE 15, STERILE

## (undated) DEVICE — DRESSING MEPILEX AG BORDER 4 X 8 IN

## (undated) DEVICE — ORTHOPEDIC PACK: Brand: CARDINAL HEALTH

## (undated) DEVICE — DELTA XTEND NON LOCKING METAGLENE SCREW DIA 4.5 LG 18MM
Type: IMPLANTABLE DEVICE | Site: SHOULDER | Status: NON-FUNCTIONAL
Brand: DELTA XTEND

## (undated) DEVICE — 3M™ IOBAN™ 2 ANTIMICROBIAL INCISE DRAPE 6650EZ: Brand: IOBAN™ 2

## (undated) DEVICE — SUT VICRYL 4-0 PS-2 18 IN J496G

## (undated) DEVICE — REPEL LITE CUT REST SURGICAL GLV LNRS X-LG: Brand: REPEL

## (undated) DEVICE — ANTIBACTERIAL VIOLET BRAIDED (POLYGLACTIN 910), SYNTHETIC ABSORBABLE SUTURE: Brand: COATED VICRYL

## (undated) DEVICE — DRESSING MEPILEX AG BORDER POST-OP 4 X 8 IN

## (undated) DEVICE — SYRINGE 10ML SLIP TIP LF

## (undated) DEVICE — GLOVE SRG BIOGEL 8

## (undated) DEVICE — SUT ETHIBOND 5 V-37 30 IN MB66G